# Patient Record
Sex: FEMALE | Race: WHITE | NOT HISPANIC OR LATINO | URBAN - METROPOLITAN AREA
[De-identification: names, ages, dates, MRNs, and addresses within clinical notes are randomized per-mention and may not be internally consistent; named-entity substitution may affect disease eponyms.]

---

## 2017-11-16 ENCOUNTER — INPATIENT (INPATIENT)
Facility: HOSPITAL | Age: 82
LOS: 1 days | Discharge: HOME | End: 2017-11-18
Attending: HOSPITALIST | Admitting: HOSPITALIST
Payer: MEDICARE

## 2017-11-16 VITALS
TEMPERATURE: 98 F | DIASTOLIC BLOOD PRESSURE: 81 MMHG | SYSTOLIC BLOOD PRESSURE: 158 MMHG | HEART RATE: 82 BPM | WEIGHT: 130.07 LBS | RESPIRATION RATE: 18 BRPM | HEIGHT: 62 IN | OXYGEN SATURATION: 98 %

## 2017-11-16 DIAGNOSIS — E78.00 PURE HYPERCHOLESTEROLEMIA, UNSPECIFIED: ICD-10-CM

## 2017-11-16 DIAGNOSIS — I10 ESSENTIAL (PRIMARY) HYPERTENSION: ICD-10-CM

## 2017-11-16 DIAGNOSIS — R74.8 ABNORMAL LEVELS OF OTHER SERUM ENZYMES: ICD-10-CM

## 2017-11-16 DIAGNOSIS — M79.89 OTHER SPECIFIED SOFT TISSUE DISORDERS: ICD-10-CM

## 2017-11-16 DIAGNOSIS — R55 SYNCOPE AND COLLAPSE: ICD-10-CM

## 2017-11-16 LAB
ALBUMIN SERPL ELPH-MCNC: 3.8 G/DL — SIGNIFICANT CHANGE UP (ref 3.3–5)
ALP SERPL-CCNC: 49 U/L — SIGNIFICANT CHANGE UP (ref 40–120)
ALT FLD-CCNC: 23 U/L — SIGNIFICANT CHANGE UP (ref 12–78)
ANION GAP SERPL CALC-SCNC: 7 MMOL/L — SIGNIFICANT CHANGE UP (ref 5–17)
APTT BLD: 31.5 SEC — SIGNIFICANT CHANGE UP (ref 27.5–37.4)
AST SERPL-CCNC: 24 U/L — SIGNIFICANT CHANGE UP (ref 15–37)
BASOPHILS # BLD AUTO: 0.1 K/UL — SIGNIFICANT CHANGE UP (ref 0–0.2)
BASOPHILS NFR BLD AUTO: 1.9 % — SIGNIFICANT CHANGE UP (ref 0–2)
BILIRUB SERPL-MCNC: 1 MG/DL — SIGNIFICANT CHANGE UP (ref 0.2–1.2)
BUN SERPL-MCNC: 12 MG/DL — SIGNIFICANT CHANGE UP (ref 7–23)
CALCIUM SERPL-MCNC: 9 MG/DL — SIGNIFICANT CHANGE UP (ref 8.5–10.1)
CHLORIDE SERPL-SCNC: 106 MMOL/L — SIGNIFICANT CHANGE UP (ref 96–108)
CO2 SERPL-SCNC: 29 MMOL/L — SIGNIFICANT CHANGE UP (ref 22–31)
CREAT SERPL-MCNC: 0.66 MG/DL — SIGNIFICANT CHANGE UP (ref 0.5–1.3)
EOSINOPHIL # BLD AUTO: 0.1 K/UL — SIGNIFICANT CHANGE UP (ref 0–0.5)
EOSINOPHIL NFR BLD AUTO: 1.1 % — SIGNIFICANT CHANGE UP (ref 0–6)
GLUCOSE BLDC GLUCOMTR-MCNC: 112 MG/DL — HIGH (ref 70–99)
GLUCOSE SERPL-MCNC: 127 MG/DL — HIGH (ref 70–99)
HCT VFR BLD CALC: 40.8 % — SIGNIFICANT CHANGE UP (ref 34.5–45)
HGB BLD-MCNC: 13.4 G/DL — SIGNIFICANT CHANGE UP (ref 11.5–15.5)
INR BLD: 1.13 RATIO — SIGNIFICANT CHANGE UP (ref 0.88–1.16)
LYMPHOCYTES # BLD AUTO: 1.2 K/UL — SIGNIFICANT CHANGE UP (ref 1–3.3)
LYMPHOCYTES # BLD AUTO: 24.8 % — SIGNIFICANT CHANGE UP (ref 13–44)
MCHC RBC-ENTMCNC: 31.1 PG — SIGNIFICANT CHANGE UP (ref 27–34)
MCHC RBC-ENTMCNC: 32.9 GM/DL — SIGNIFICANT CHANGE UP (ref 32–36)
MCV RBC AUTO: 94.5 FL — SIGNIFICANT CHANGE UP (ref 80–100)
MONOCYTES # BLD AUTO: 0.3 K/UL — SIGNIFICANT CHANGE UP (ref 0–0.9)
MONOCYTES NFR BLD AUTO: 7.1 % — SIGNIFICANT CHANGE UP (ref 2–14)
NEUTROPHILS # BLD AUTO: 3.2 K/UL — SIGNIFICANT CHANGE UP (ref 1.8–7.4)
NEUTROPHILS NFR BLD AUTO: 65.2 % — SIGNIFICANT CHANGE UP (ref 43–77)
NT-PROBNP SERPL-SCNC: 154 PG/ML — SIGNIFICANT CHANGE UP (ref 0–450)
PLATELET # BLD AUTO: 180 K/UL — SIGNIFICANT CHANGE UP (ref 150–400)
POTASSIUM SERPL-MCNC: 3.9 MMOL/L — SIGNIFICANT CHANGE UP (ref 3.5–5.3)
POTASSIUM SERPL-SCNC: 3.9 MMOL/L — SIGNIFICANT CHANGE UP (ref 3.5–5.3)
PROT SERPL-MCNC: 8 GM/DL — SIGNIFICANT CHANGE UP (ref 6–8.3)
PROTHROM AB SERPL-ACNC: 12.4 SEC — SIGNIFICANT CHANGE UP (ref 9.8–12.7)
RBC # BLD: 4.32 M/UL — SIGNIFICANT CHANGE UP (ref 3.8–5.2)
RBC # FLD: 12.7 % — SIGNIFICANT CHANGE UP (ref 11–15)
SODIUM SERPL-SCNC: 142 MMOL/L — SIGNIFICANT CHANGE UP (ref 135–145)
TROPONIN I SERPL-MCNC: 0.05 NG/ML — HIGH (ref 0.01–0.04)
TROPONIN I SERPL-MCNC: 0.05 NG/ML — HIGH (ref 0.01–0.04)
WBC # BLD: 4.8 K/UL — SIGNIFICANT CHANGE UP (ref 3.8–10.5)
WBC # FLD AUTO: 4.8 K/UL — SIGNIFICANT CHANGE UP (ref 3.8–10.5)

## 2017-11-16 PROCEDURE — 71010: CPT | Mod: 26

## 2017-11-16 PROCEDURE — 99284 EMERGENCY DEPT VISIT MOD MDM: CPT

## 2017-11-16 PROCEDURE — 93010 ELECTROCARDIOGRAM REPORT: CPT

## 2017-11-16 PROCEDURE — 70450 CT HEAD/BRAIN W/O DYE: CPT | Mod: 26

## 2017-11-16 PROCEDURE — 99223 1ST HOSP IP/OBS HIGH 75: CPT

## 2017-11-16 RX ORDER — SIMVASTATIN 20 MG/1
20 TABLET, FILM COATED ORAL AT BEDTIME
Qty: 0 | Refills: 0 | Status: DISCONTINUED | OUTPATIENT
Start: 2017-11-16 | End: 2017-11-18

## 2017-11-16 RX ORDER — REGADENOSON 0.08 MG/ML
0.4 INJECTION, SOLUTION INTRAVENOUS ONCE
Qty: 0 | Refills: 0 | Status: COMPLETED | OUTPATIENT
Start: 2017-11-16 | End: 2017-11-17

## 2017-11-16 RX ORDER — ASPIRIN/CALCIUM CARB/MAGNESIUM 324 MG
325 TABLET ORAL ONCE
Qty: 0 | Refills: 0 | Status: COMPLETED | OUTPATIENT
Start: 2017-11-16 | End: 2017-11-16

## 2017-11-16 RX ORDER — ASPIRIN/CALCIUM CARB/MAGNESIUM 324 MG
81 TABLET ORAL DAILY
Qty: 0 | Refills: 0 | Status: DISCONTINUED | OUTPATIENT
Start: 2017-11-16 | End: 2017-11-18

## 2017-11-16 RX ORDER — AMLODIPINE BESYLATE 2.5 MG/1
10 TABLET ORAL AT BEDTIME
Qty: 0 | Refills: 0 | Status: DISCONTINUED | OUTPATIENT
Start: 2017-11-16 | End: 2017-11-18

## 2017-11-16 RX ADMIN — SIMVASTATIN 20 MILLIGRAM(S): 20 TABLET, FILM COATED ORAL at 21:10

## 2017-11-16 RX ADMIN — Medication 325 MILLIGRAM(S): at 12:23

## 2017-11-16 RX ADMIN — AMLODIPINE BESYLATE 10 MILLIGRAM(S): 2.5 TABLET ORAL at 21:11

## 2017-11-16 NOTE — ED ADULT NURSE REASSESSMENT NOTE - NS ED NURSE REASSESS COMMENT FT1
denies chest pain at present denies dizziness at present vss on monitor admission pending no acute distress noted

## 2017-11-16 NOTE — ED ADULT NURSE NOTE - OBJECTIVE STATEMENT
patient received, alert and oriented x4, stated blacked out upon walking from kitchen to bedroom, denies falling on the floor, denies hitting head. denies any pain at this time.

## 2017-11-16 NOTE — H&P ADULT - NSHPPHYSICALEXAM_GEN_ALL_CORE
GENERAL: NAD well-developed  HEAD:  Atraumatic, Normocephalic  EYES: EOMI, PERRLA, conjunctiva and sclera clear  ENMT: No tonsillar erythema, exudates, or enlargement; Moist mucous membranes, Good dentition, No lesions  NECK: Supple, No JVD, Normal thyroid  NERVOUS SYSTEM:  Alert & Oriented X3, Good concentration; Motor Strength 5/5 B/L upper and lower extremities; DTRs 2+ intact and symmetric  CHEST/LUNG: Clear to percussion bilaterally; No rales, rhonchi, wheezing, or rubs  HEART: Regular rate and rhythm; No murmurs, rubs, or gallops  ABDOMEN: Soft, Nontender, Nondistended; Bowel sounds present  EXTREMITIES:  right leg>left leg   LYMPH: No lymphadenopathy   SKIN: No rashes or lesions

## 2017-11-16 NOTE — ED PROVIDER NOTE - MEDICAL DECISION MAKING DETAILS
A/P: 82 y/o F w hx of htn, hld, presents w near syncope episode today; ekg nl;   will check labs, CT head given age, and reassess A/P: 82 y/o F w hx of htn, hld, presents w near syncope episode today; ekg nl;   will check labs, CT head given age, and reassess    trop 0.054; given aspirin; will admit to hospitalist team; dr sorto (cardiology paged, awaiting call back)

## 2017-11-16 NOTE — H&P ADULT - ASSESSMENT
83f with history of Hypertension with near syncope with enlarged  right leg     IMPROVE VTE Individual Risk Assessment          RISK                                                          Points    [  ] Previous VTE                                                3    [  ] Thrombophilia                                             2    [  ] Lower limb paralysis                                    2        (unable to hold up >15 seconds)      [  ] Current Cancer                                             2         (within 6 months)    [ x ] Immobilization > 24 hrs                              1    [  ] ICU/CCU stay > 24 hours                            1    [ x ] Age > 60                                                    1    IMPROVE VTE Score ______2___  on deep vein thrombosis tx pending doppler

## 2017-11-16 NOTE — ED PROVIDER NOTE - OBJECTIVE STATEMENT
Pertinent PMH/PSH/FHx/SHx and Review of Systems contained within:    82 y/o F w hx of htn, hld, presents w near syncope episode today; patient reports she woke up this morning, went to the kitchen to take her usual morning coffee, and while in the kitchen, patient reports she felt dizzy, characterized as feeling lightheaded.  pt went to her bedroom, and laid down on the bed; patient denies syncope or LOC at any point; denies fall; her son called EMS; patient reports checking her blood pressure and her blood pressure was elevated so she came to the ED; NO headache, chest pain, palpitations, abd pain, focal weaknes or sensory changes; denies any other complaints on review of systems    PMH: as above  meds: zocor; amlodipine  allergies: nkda  SH: denies cig or drug use    EKG: sinus 83; nl intervals;

## 2017-11-16 NOTE — CONSULT NOTE ADULT - ASSESSMENT
84 y/o F w hx of htn, hld, presents w near syncope today; reports she felt dizzy/lightheaded but denies syncope or LOC at any point  Reports checking her blood pressure and her blood pressure was elevated  (160s/115).  Feeling much better currently.  EKG unremarkable: sinus 83bpm with sinus arrhythmia; no ST-T wave changes  Head CT with no acute event.  Dk x 1 with trop 0.05 with neg CK    Likely HTN urgency.    -cont home BP meds and adjust as needed; currently 150/70.  Can add ACE in AM if remains at this level.  -2D echo  -monitor on tele  -trend Dk  -daily EKG  -stress test when stable (can be done as outpt)

## 2017-11-16 NOTE — CONSULT NOTE ADULT - SUBJECTIVE AND OBJECTIVE BOX
CARDIOLOGY CONSULT NOTE    Patient is a 83y Female with a known history of :    HPI:  82 y/o F w hx of htn, hld, presents w near syncope episode today; patient reports she woke up this morning, went to the kitchen to take her usual morning coffee, and while in the kitchen, patient reports she felt dizzy, characterized as feeling lightheaded.  pt went to her bedroom, and laid down on the bed; patient denies syncope or LOC at any point; denies fall; her son called EMS; patient reports checking her blood pressure and her blood pressure was elevated  (160s/115) so she came to the ED; NO headache, chest pain, palpitations, abd pain, focal weaknes or sensory changes; denies any other complaints on review of systems.  Feeling much better currently.  Comfortable on stretcher.  EKG unremarkable; Head CT with no acute event.      REVIEW OF SYSTEMS:    CONSTITUTIONAL: No fever, weight loss; + fatigue  EYES: No eye pain, visual disturbances, or discharge  ENMT:  No difficulty hearing, tinnitus, vertigo; No sinus or throat pain  NECK: No pain or stiffness  BREASTS: No pain, masses, or nipple discharge  RESPIRATORY: No cough, wheezing, chills or hemoptysis; No shortness of breath  CARDIOVASCULAR: No chest pain, palpitations; + dizziness  GASTROINTESTINAL: No abdominal or epigastric pain. No nausea, vomiting, or hematemesis; No diarrhea or constipation. No melena or hematochezia.  GENITOURINARY: No dysuria, frequency, hematuria, or incontinence  NEUROLOGICAL: No headaches, memory loss, loss of strength, numbness, or tremors  SKIN: No itching, burning, rashes, or lesions   LYMPH NODES: No enlarged glands  ENDOCRINE: No heat or cold intolerance; No hair loss  MUSCULOSKELETAL: No joint pain or swelling; No muscle, back, or extremity pain  PSYCHIATRIC: No depression, anxiety, mood swings, or difficulty sleeping  HEME/LYMPH: No easy bruising, or bleeding gums  ALLERGY AND IMMUNOLOGIC: No hives or eczema    MEDICATIONS  (STANDING):  Home: zocor and amlodipine    MEDICATIONS  (PRN):      ALLERGIES: No Known Allergies      FAMILY HISTORY:  N/C    PHYSICAL EXAMINATION:  -----------------------------  T(C): 36.5 (11-16-17 @ 09:53), Max: 36.5 (11-16-17 @ 09:53)  HR: 89 (11-16-17 @ 12:24) (82 - 89)  BP: 151/71 (11-16-17 @ 12:24) (151/71 - 158/81)  RR: 18 (11-16-17 @ 12:24) (18 - 18)  SpO2: 98% (11-16-17 @ 12:24) (98% - 98%)  Wt(kg): --    Height (cm): 157.48 (11-16 @ 09:53)  Weight (kg): 59 (11-16 @ 09:53)  BMI (kg/m2): 23.8 (11-16 @ 09:53)  BSA (m2): 1.59 (11-16 @ 09:53)    Constitutional: well developed, normal appearance, well groomed, well nourished, no deformities and no acute distress.   Eyes: the conjunctiva exhibited no abnormalities and the eyelids demonstrated no xanthelasmas.   HEENT: normal oral mucosa, no oral pallor and no oral cyanosis.   Neck: normal jugular venous A waves present, normal jugular venous V waves present and no jugular venous marie A waves.   Pulmonary: no respiratory distress, normal respiratory rhythm and effort, no accessory muscle use and lungs were clear to auscultation bilaterally.   Cardiovascular: heart rate and rhythm were normal, normal S1 and S2 and no murmur, gallop, rub, heave or thrill are present.   Abdomen: soft, non-tender, no hepato-splenomegaly and no abdominal mass palpated.   Musculoskeletal: the gait could not be assessed..   Extremities: no clubbing of the fingernails, no localized cyanosis, no petechial hemorrhages and no ischemic changes.   Skin: normal skin color and pigmentation, no rash, no venous stasis, no skin lesions, no skin ulcer and no xanthoma was observed.   Psychiatric: oriented to person, place, and time, the affect was normal, the mood was normal and not feeling anxious.     LABS:   --------  11-16    142  |  106  |  12  ----------------------------<  127<H>  3.9   |  29  |  0.66    Ca    9.0      16 Nov 2017 11:18    TPro  8.0  /  Alb  3.8  /  TBili  1.0  /  DBili  x   /  AST  24  /  ALT  23  /  AlkPhos  49  11-16                         13.4   4.8   )-----------( 180      ( 16 Nov 2017 11:18 )             40.8     PT/INR - ( 16 Nov 2017 11:18 )   PT: 12.4 sec;   INR: 1.13 ratio         PTT - ( 16 Nov 2017 11:18 )  PTT:31.5 sec  11-16 @ 11:21 BNP: 154 pg/mL    11-16 @ 11:20 CPK total:--, CKMB --, Troponin I - .054 ng/mL<H>          RADIOLOGY:  -----------------    ECG:  sinus 83bpm with sinus arrhythmia; no ST-T wave changes

## 2017-11-16 NOTE — H&P ADULT - NSHPLABSRESULTS_GEN_ALL_CORE
13.4   4.8   )-----------( 180      ( 16 Nov 2017 11:18 )             40.8   11-16    142  |  106  |  12  ----------------------------<  127<H>  3.9   |  29  |  0.66    Ca    9.0      16 Nov 2017 11:18    TPro  8.0  /  Alb  3.8  /  TBili  1.0  /  DBili  x   /  AST  24  /  ALT  23  /  AlkPhos  49  11-16    < from: CT Head No Cont (11.16.17 @ 11:52) >    )  chronic ischemic changes noted in both hemispheres with volume loss.   No acute abnormality suggested. Incidental a tortuous vertebrobasilar   system..  2)  no hemorrhagic foci or subdural hematoma seen.

## 2017-11-16 NOTE — H&P ADULT - HISTORY OF PRESENT ILLNESS
82 y/o F w hx of htn, hld, presents w near syncope episode today; patient reports she woke up this morning, went to the kitchen to take her usual morning coffee, and while in the kitchen, patient reports she felt dizzy, characterized as feeling lightheaded.  pt went to her bedroom, and laid down on the bed; patient denies syncope or LOC at any point; denies fall; her son called EMS; patient reports checking her blood pressure and her blood pressure was elevated so she came to the ED; NO headache, chest pain, palpitations, abd pain, focal weaknes or sensory changes; denies any other complaints on review of systems

## 2017-11-16 NOTE — ED PROVIDER NOTE - PHYSICAL EXAMINATION
Gen: well appearing, in no distress  HEENT: clear oropharynx; EOMI; PERRL ~ 2mm; no signs of trauma  neck: full cervical ROM; no c spine ttp  CV: RRR; no m/g/r  lungs; CTAB; no w/r/r  abd: soft, nd, nt;   back: no t/l spine ttp  ext: wwp; full ROM; wwp;   neuro: no focal weakness/sensory changes; cn 2-12 nl

## 2017-11-17 DIAGNOSIS — Z29.9 ENCOUNTER FOR PROPHYLACTIC MEASURES, UNSPECIFIED: ICD-10-CM

## 2017-11-17 LAB
ANION GAP SERPL CALC-SCNC: 6 MMOL/L — SIGNIFICANT CHANGE UP (ref 5–17)
BUN SERPL-MCNC: 12 MG/DL — SIGNIFICANT CHANGE UP (ref 7–23)
CALCIUM SERPL-MCNC: 8.6 MG/DL — SIGNIFICANT CHANGE UP (ref 8.5–10.1)
CHLORIDE SERPL-SCNC: 107 MMOL/L — SIGNIFICANT CHANGE UP (ref 96–108)
CO2 SERPL-SCNC: 28 MMOL/L — SIGNIFICANT CHANGE UP (ref 22–31)
CREAT SERPL-MCNC: 0.61 MG/DL — SIGNIFICANT CHANGE UP (ref 0.5–1.3)
GLUCOSE SERPL-MCNC: 96 MG/DL — SIGNIFICANT CHANGE UP (ref 70–99)
HCT VFR BLD CALC: 38.1 % — SIGNIFICANT CHANGE UP (ref 34.5–45)
HGB BLD-MCNC: 12.4 G/DL — SIGNIFICANT CHANGE UP (ref 11.5–15.5)
MCHC RBC-ENTMCNC: 30.9 PG — SIGNIFICANT CHANGE UP (ref 27–34)
MCHC RBC-ENTMCNC: 32.6 GM/DL — SIGNIFICANT CHANGE UP (ref 32–36)
MCV RBC AUTO: 94.7 FL — SIGNIFICANT CHANGE UP (ref 80–100)
PLATELET # BLD AUTO: 173 K/UL — SIGNIFICANT CHANGE UP (ref 150–400)
POTASSIUM SERPL-MCNC: 3.4 MMOL/L — LOW (ref 3.5–5.3)
POTASSIUM SERPL-SCNC: 3.4 MMOL/L — LOW (ref 3.5–5.3)
RBC # BLD: 4.03 M/UL — SIGNIFICANT CHANGE UP (ref 3.8–5.2)
RBC # FLD: 12.7 % — SIGNIFICANT CHANGE UP (ref 11–15)
SODIUM SERPL-SCNC: 141 MMOL/L — SIGNIFICANT CHANGE UP (ref 135–145)
TROPONIN I SERPL-MCNC: 0.06 NG/ML — HIGH (ref 0.01–0.04)
TROPONIN I SERPL-MCNC: 0.07 NG/ML — HIGH (ref 0.01–0.04)
WBC # BLD: 6.3 K/UL — SIGNIFICANT CHANGE UP (ref 3.8–10.5)
WBC # FLD AUTO: 6.3 K/UL — SIGNIFICANT CHANGE UP (ref 3.8–10.5)

## 2017-11-17 PROCEDURE — 93306 TTE W/DOPPLER COMPLETE: CPT | Mod: 26

## 2017-11-17 PROCEDURE — 99232 SBSQ HOSP IP/OBS MODERATE 35: CPT | Mod: 25

## 2017-11-17 PROCEDURE — 99233 SBSQ HOSP IP/OBS HIGH 50: CPT

## 2017-11-17 PROCEDURE — 93016 CV STRESS TEST SUPVJ ONLY: CPT

## 2017-11-17 RX ORDER — LACTULOSE 10 G/15ML
10 SOLUTION ORAL ONCE
Qty: 0 | Refills: 0 | Status: COMPLETED | OUTPATIENT
Start: 2017-11-17 | End: 2017-11-17

## 2017-11-17 RX ORDER — ENOXAPARIN SODIUM 100 MG/ML
40 INJECTION SUBCUTANEOUS EVERY 24 HOURS
Qty: 0 | Refills: 0 | Status: DISCONTINUED | OUTPATIENT
Start: 2017-11-17 | End: 2017-11-18

## 2017-11-17 RX ORDER — POTASSIUM CHLORIDE 20 MEQ
40 PACKET (EA) ORAL ONCE
Qty: 0 | Refills: 0 | Status: COMPLETED | OUTPATIENT
Start: 2017-11-17 | End: 2017-11-17

## 2017-11-17 RX ADMIN — SIMVASTATIN 20 MILLIGRAM(S): 20 TABLET, FILM COATED ORAL at 21:16

## 2017-11-17 RX ADMIN — REGADENOSON 0.4 MILLIGRAM(S): 0.08 INJECTION, SOLUTION INTRAVENOUS at 12:01

## 2017-11-17 RX ADMIN — LACTULOSE 10 GRAM(S): 10 SOLUTION ORAL at 18:36

## 2017-11-17 RX ADMIN — Medication 81 MILLIGRAM(S): at 15:09

## 2017-11-17 RX ADMIN — AMLODIPINE BESYLATE 10 MILLIGRAM(S): 2.5 TABLET ORAL at 21:16

## 2017-11-17 RX ADMIN — Medication 40 MILLIEQUIVALENT(S): at 15:09

## 2017-11-17 RX ADMIN — ENOXAPARIN SODIUM 40 MILLIGRAM(S): 100 INJECTION SUBCUTANEOUS at 15:09

## 2017-11-17 NOTE — PROGRESS NOTE ADULT - SUBJECTIVE AND OBJECTIVE BOX
Patient is a 83y old  Female who presents with a chief complaint of syncope (16 Nov 2017 13:45)      PAST MEDICAL & SURGICAL HISTORY:  Hypercholesteremia  HTN (hypertension)  No significant past surgical history      INTERVAL HISTORY: Resting in bed, not in any distress  	  MEDICATIONS:  MEDICATIONS  (STANDING):  amLODIPine   Tablet 10 milliGRAM(s) Oral at bedtime  aspirin enteric coated 81 milliGRAM(s) Oral daily  enoxaparin Injectable 40 milliGRAM(s) SubCutaneous every 24 hours  potassium chloride    Tablet ER 40 milliEquivalent(s) Oral once  regadenoson Injectable 0.4 milliGRAM(s) IV Push once  simvastatin 20 milliGRAM(s) Oral at bedtime    MEDICATIONS  (PRN):      Vitals:  T(F): 97.8 (11-17-17 @ 05:19), Max: 98.1 (11-16-17 @ 15:19)  HR: 77 (11-17-17 @ 05:19) (72 - 89)  BP: 132/72 (11-17-17 @ 05:19) (122/68 - 151/71)  RR: 18 (11-17-17 @ 05:19) (15 - 18)  SpO2: 96% (11-17-17 @ 05:19) (96% - 99%)  Wt(kg): -- 60kg    11-16 @ 07:01  -  11-17 @ 07:00  --------------------------------------------------------  IN:    Oral Fluid: 118 mL  Total IN: 118 mL    OUT:  Total OUT: 0 mL    Total NET: 118 mL        Weight (kg): 58.5 (11-16 @ 18:00)  BMI (kg/m2): 23.6 (11-16 @ 18:00)      PHYSICAL EXAM:  Neuro:  CV:   Lungs:   GI:   Extremities:     TELEMETRY: RSR  	    RADIOLOGY: < from: Xray Chest 1 View AP/PA. (11.16.17 @ 14:14) >  Normal heart size accounting for AP film. Unfolding thoracic aorta.   Bilateral calcified hilar lymph nodes. No focalconsolidation or pleural   effusion.    Impression: No acute process.    < end of copied text >  < from: CT Head No Cont (11.16.17 @ 11:52) >  1)  chronic ischemic changes noted in both hemispheres with volume loss.   No acute abnormality suggested. Incidental a tortuous vertebrobasilar   system..  2)  no hemorrhagic foci or subdural hematoma seen.    < end of copied text >      DIAGNOSTIC TESTING:    [P ] Echocardiogram:       LABS:	 	    CARDIAC MARKERS:  Troponin I, Serum: .065 ng/mL (11-17 @ 04:43)  Troponin I, Serum: .048 ng/mL (11-16 @ 20:14)  Troponin I, Serum: .054 ng/mL (11-16 @ 11:20)          17 Nov 2017 04:43    141    |  107    |  12     ----------------------------<  96     3.4     |  28     |  0.61   16 Nov 2017 11:18    142    |  106    |  12     ----------------------------<  127    3.9     |  29     |  0.66     Ca    8.6        17 Nov 2017 04:43    TPro  8.0    /  Alb  3.8    /  TBili  1.0    /  DBili  x      /  AST  24     /  ALT  23     /  AlkPhos  49     16 Nov 2017 11:18                          12.4   6.3   )-----------( 173      ( 17 Nov 2017 04:43 )             38.1 ,                       13.4   4.8   )-----------( 180      ( 16 Nov 2017 11:18 )             40.8   proBNP: Serum Pro-Brain Natriuretic Peptide: 154 pg/mL (11-16 @ 11:21)      PT/PTT- ( 16 Nov 2017 11:18 )   PT: 12.4 sec;  PTT: 31.5 sec      INR: 1.13 ratio (11-16 @ 11:18) Patient is a 83y old  Female who presents with a chief complaint of syncope (16 Nov 2017 13:45)      PAST MEDICAL & SURGICAL HISTORY:  Hypercholesteremia  HTN (hypertension)  No significant past surgical history      INTERVAL HISTORY: Resting in bed, not in any distress, denies any chest pain or Sob  	  MEDICATIONS:  MEDICATIONS  (STANDING):  amLODIPine   Tablet 10 milliGRAM(s) Oral at bedtime  aspirin enteric coated 81 milliGRAM(s) Oral daily  enoxaparin Injectable 40 milliGRAM(s) SubCutaneous every 24 hours  potassium chloride    Tablet ER 40 milliEquivalent(s) Oral once  regadenoson Injectable 0.4 milliGRAM(s) IV Push once  simvastatin 20 milliGRAM(s) Oral at bedtime    MEDICATIONS  (PRN):      Vitals:  T(F): 97.8 (11-17-17 @ 05:19), Max: 98.1 (11-16-17 @ 15:19)  HR: 77 (11-17-17 @ 05:19) (72 - 89)  BP: 132/72 (11-17-17 @ 05:19) (122/68 - 151/71)  RR: 18 (11-17-17 @ 05:19) (15 - 18)  SpO2: 96% (11-17-17 @ 05:19) (96% - 99%)  Wt(kg): -- 60kg    11-16 @ 07:01  -  11-17 @ 07:00  --------------------------------------------------------  IN:    Oral Fluid: 118 mL  Total IN: 118 mL    OUT:  Total OUT: 0 mL    Total NET: 118 mL        Weight (kg): 58.5 (11-16 @ 18:00)  BMI (kg/m2): 23.6 (11-16 @ 18:00)      PHYSICAL EXAM:  Neuro: Awake , responsive  CV: RRR, S1 S2, + soft systolic murmur  Lungs: CTABL  GI: soft, BS+, NT, ND  Extremities: No edema    TELEMETRY: RSR  	    RADIOLOGY: < from: Xray Chest 1 View AP/PA. (11.16.17 @ 14:14) >  Normal heart size accounting for AP film. Unfolding thoracic aorta.   Bilateral calcified hilar lymph nodes. No focalconsolidation or pleural   effusion.    Impression: No acute process.    < end of copied text >  < from: CT Head No Cont (11.16.17 @ 11:52) >  1)  chronic ischemic changes noted in both hemispheres with volume loss.   No acute abnormality suggested. Incidental a tortuous vertebrobasilar   system..  2)  no hemorrhagic foci or subdural hematoma seen.    < end of copied text >      DIAGNOSTIC TESTING:    [X ] Echocardiogram: < from: TTE Echo Doppler w/o Cont (11.17.17 @ 10:51) >   1. Left ventricular ejection fraction, by visual estimation, is 60 to   65%.   2. Technically good study.   3. Normal global left ventricular systolic function.   4. Normal left ventricular internal cavity size.   5. Spectral Doppler shows impaired relaxation pattern of left   ventricular myocardial filling (Grade I diastolic dysfunction).   6. Normal rightventricular size and function.   7. Mild mitral annular calcification.   8. Mild mitral valve regurgitation.   9. Thickening and calcification of the anterior and posterior mitral   valve leaflets.  10. Degenerative tricuspid valve.  11. Mild to moderate aortic regurgitation.  12. Estimated pulmonary artery systolic pressure is 37.6 mmHg assuming a   right atrial pressure of 5 mmHg, which is consistent with borderline   pulmonary hypertension.  13. Peak transaortic gradient equals 9.8 mmHg, mean transaortic gradient   equals 6.3 mmHg, the calculated aortic valve area equals 2.50 cm² by the   continuity equation consistent with mild aortic stenosis.    < end of copied text >    < from: NM Pharm Stress Test, Dual Isotope (11.17.17 @ 14:07) >  1. No scintigraphic evidence for myocardial infarct or ischemia.    2. There is normal left ventricular contractility, normal calculated   ejection fraction and normal myocardial thickening at rest. Overall post   stress ejection fraction was 73 %     3. Please see the cardiac test report for EKG findings and symptoms   during the procedure    < end of copied text >      LABS:	 	    CARDIAC MARKERS:  Troponin I, Serum: .065 ng/mL (11-17 @ 04:43)  Troponin I, Serum: .048 ng/mL (11-16 @ 20:14)  Troponin I, Serum: .054 ng/mL (11-16 @ 11:20)          17 Nov 2017 04:43    141    |  107    |  12     ----------------------------<  96     3.4     |  28     |  0.61   16 Nov 2017 11:18    142    |  106    |  12     ----------------------------<  127    3.9     |  29     |  0.66     Ca    8.6        17 Nov 2017 04:43    TPro  8.0    /  Alb  3.8    /  TBili  1.0    /  DBili  x      /  AST  24     /  ALT  23     /  AlkPhos  49     16 Nov 2017 11:18                          12.4   6.3   )-----------( 173      ( 17 Nov 2017 04:43 )             38.1 ,                       13.4   4.8   )-----------( 180      ( 16 Nov 2017 11:18 )             40.8   proBNP: Serum Pro-Brain Natriuretic Peptide: 154 pg/mL (11-16 @ 11:21)      PT/PTT- ( 16 Nov 2017 11:18 )   PT: 12.4 sec;  PTT: 31.5 sec      INR: 1.13 ratio (11-16 @ 11:18) Patient is a 83y old  Female who presents with a chief complaint of syncope (16 Nov 2017 13:45)      PAST MEDICAL & SURGICAL HISTORY:  Hypercholesteremia  HTN (hypertension)  No significant past surgical history      INTERVAL HISTORY: Resting in bed, not in any distress, denies any chest pain or Sob  	  MEDICATIONS:  MEDICATIONS  (STANDING):  amLODIPine   Tablet 10 milliGRAM(s) Oral at bedtime  aspirin enteric coated 81 milliGRAM(s) Oral daily  enoxaparin Injectable 40 milliGRAM(s) SubCutaneous every 24 hours  potassium chloride    Tablet ER 40 milliEquivalent(s) Oral once  regadenoson Injectable 0.4 milliGRAM(s) IV Push once  simvastatin 20 milliGRAM(s) Oral at bedtime      Vitals:  T(F): 97.8 (11-17-17 @ 05:19), Max: 98.1 (11-16-17 @ 15:19)  HR: 77 (11-17-17 @ 05:19) (72 - 89)  BP: 132/72 (11-17-17 @ 05:19) (122/68 - 151/71)  RR: 18 (11-17-17 @ 05:19) (15 - 18)  SpO2: 96% (11-17-17 @ 05:19) (96% - 99%)  Wt(kg): -- 60kg    11-16 @ 07:01  -  11-17 @ 07:00  --------------------------------------------------------  IN:    Oral Fluid: 118 mL  Total IN: 118 mL    OUT:  Total OUT: 0 mL    Total NET: 118 mL        Weight (kg): 58.5 (11-16 @ 18:00)  BMI (kg/m2): 23.6 (11-16 @ 18:00)      PHYSICAL EXAM:  Neuro: Awake , responsive  CV: RRR, S1 S2, + soft systolic murmur  Lungs: CTABL  GI: soft, BS+, NT, ND  Extremities: No edema    TELEMETRY: RSR  	    RADIOLOGY: < from: Xray Chest 1 View AP/PA. (11.16.17 @ 14:14) >  Normal heart size accounting for AP film. Unfolding thoracic aorta.   Bilateral calcified hilar lymph nodes. No focalconsolidation or pleural   effusion.    Impression: No acute process.    < end of copied text >  < from: CT Head No Cont (11.16.17 @ 11:52) >  1)  chronic ischemic changes noted in both hemispheres with volume loss.   No acute abnormality suggested. Incidental a tortuous vertebrobasilar   system..  2)  no hemorrhagic foci or subdural hematoma seen.    < end of copied text >      DIAGNOSTIC TESTING:    [X ] Echocardiogram: < from: TTE Echo Doppler w/o Cont (11.17.17 @ 10:51) >   1. Left ventricular ejection fraction, by visual estimation, is 60 to   65%.   2. Technically good study.   3. Normal global left ventricular systolic function.   4. Normal left ventricular internal cavity size.   5. Spectral Doppler shows impaired relaxation pattern of left   ventricular myocardial filling (Grade I diastolic dysfunction).   6. Normal rightventricular size and function.   7. Mild mitral annular calcification.   8. Mild mitral valve regurgitation.   9. Thickening and calcification of the anterior and posterior mitral   valve leaflets.  10. Degenerative tricuspid valve.  11. Mild to moderate aortic regurgitation.  12. Estimated pulmonary artery systolic pressure is 37.6 mmHg assuming a   right atrial pressure of 5 mmHg, which is consistent with borderline   pulmonary hypertension.  13. Peak transaortic gradient equals 9.8 mmHg, mean transaortic gradient   equals 6.3 mmHg, the calculated aortic valve area equals 2.50 cm² by the   continuity equation consistent with mild aortic stenosis.    < end of copied text >    < from: NM Pharm Stress Test, Dual Isotope (11.17.17 @ 14:07) >  1. No scintigraphic evidence for myocardial infarct or ischemia.    2. There is normal left ventricular contractility, normal calculated   ejection fraction and normal myocardial thickening at rest. Overall post   stress ejection fraction was 73 %     3. Please see the cardiac test report for EKG findings and symptoms   during the procedure    < end of copied text >      LABS:	 	    CARDIAC MARKERS:  Troponin I, Serum: .065 ng/mL (11-17 @ 04:43)  Troponin I, Serum: .048 ng/mL (11-16 @ 20:14)  Troponin I, Serum: .054 ng/mL (11-16 @ 11:20)          17 Nov 2017 04:43    141    |  107    |  12     ----------------------------<  96     3.4     |  28     |  0.61   16 Nov 2017 11:18    142    |  106    |  12     ----------------------------<  127    3.9     |  29     |  0.66     Ca    8.6        17 Nov 2017 04:43    TPro  8.0    /  Alb  3.8    /  TBili  1.0    /  DBili  x      /  AST  24     /  ALT  23     /  AlkPhos  49     16 Nov 2017 11:18                          12.4   6.3   )-----------( 173      ( 17 Nov 2017 04:43 )             38.1 ,                       13.4   4.8   )-----------( 180      ( 16 Nov 2017 11:18 )             40.8   proBNP: Serum Pro-Brain Natriuretic Peptide: 154 pg/mL (11-16 @ 11:21)      PT/PTT- ( 16 Nov 2017 11:18 )   PT: 12.4 sec;  PTT: 31.5 sec      INR: 1.13 ratio (11-16 @ 11:18)

## 2017-11-17 NOTE — PROGRESS NOTE ADULT - ASSESSMENT
82 y/o F w hx of htn, hld, presents w near syncope today; reports she felt dizzy/lightheaded but denies syncope or LOC at any point  Reports checking her blood pressure and her blood pressure was elevated  (160s/115).  Feeling much better currently.  EKG unremarkable: sinus 83bpm with sinus arrhythmia; no ST-T wave changes  Head CT with no acute event.  Dk x 3 with trop 0.048-0.065  with neg CK    Likely HTN urgency.    -BP now controlled on Norvasc 10  C/w ASA  c/w statin for HLD  -2D echo pending  -monitor on tele  -stress test   - check Orthostatic BP  -Potassium PO for hypokalemia 82 y/o F w hx of htn, hld, presents w near syncope today; reports she felt dizzy/lightheaded but denies syncope or LOC at any point  Reports checking her blood pressure and her blood pressure was elevated  (160s/115).  Feeling much better currently.  EKG unremarkable: sinus 83bpm with sinus arrhythmia; no ST-T wave changes  Head CT with no acute event.  Dk x 3 with trop 0.048-0.065  with neg CK    Likely HTN urgency.    -BP now controlled on Norvasc 10  C/w ASA  c/w statin for HLD  -2D echo with preserved EF  -stress test : No ischemia  - check Orthostatic BP  -Potassium PO for hypokalemia  f/u with Dr. Winters as outpatient

## 2017-11-17 NOTE — PROGRESS NOTE ADULT - ASSESSMENT
82 y/o F w hx of htn, hld, presents w near syncope today; reports she felt dizzy/lightheaded but denies syncope or LOC at any point  Reports checking her blood pressure and her blood pressure was elevated  (160s/115).  Feeling much better currently.  EKG unremarkable: sinus 83bpm with sinus arrhythmia; no ST-T wave changes  Head CT with no acute event.  Dk x 3 with trop 0.048-0.065  with neg CK    Likely HTN urgency.    -BP now controlled on Norvasc 10  C/w ASA  c/w statin for HLD  -2D echo with preserved EF  -stress test : No ischemia  - check Orthostatic BP  -Potassium PO for hypokalemia  f/u with Dr. Winters as outpatient

## 2017-11-17 NOTE — PROGRESS NOTE ADULT - SUBJECTIVE AND OBJECTIVE BOX
Patient is a 83y old  Female who presents with a chief complaint of syncope (16 Nov 2017 13:45)      INTERVAL HPI/OVERNIGHT EVENTS: new admission overnight feeling ok but has complete of right leg swelling     MEDICATIONS  (STANDING):  amLODIPine   Tablet 10 milliGRAM(s) Oral at bedtime  aspirin enteric coated 81 milliGRAM(s) Oral daily  enoxaparin Injectable 40 milliGRAM(s) SubCutaneous every 24 hours  lactulose Syrup 10 Gram(s) Oral once  simvastatin 20 milliGRAM(s) Oral at bedtime    MEDICATIONS  (PRN):      Allergies    No Known Allergies    Intolerances        REVIEW OF SYSTEMS:  CONSTITUTIONAL: No fever, weight loss, or fatigue  EYES: No eye pain, visual disturbances, or discharge  ENMT:  No difficulty hearing, tinnitus, vertigo; No sinus or throat pain  NECK: No pain or stiffness  BREASTS: No pain, masses, or nipple discharge  RESPIRATORY: No cough, wheezing, chills or hemoptysis; No shortness of breath  CARDIOVASCULAR: No chest pain, palpitations, dizziness, or leg swelling  GASTROINTESTINAL: No abdominal or epigastric pain. No nausea, vomiting, or hematemesis; No diarrhea or constipation. No melena or hematochezia.  GENITOURINARY: No dysuria, frequency, hematuria, or incontinence  NEUROLOGICAL: No headaches, memory loss, loss of strength, numbness, or tremors  SKIN: No itching, burning, rashes, or lesions   LYMPH NODES: No enlarged glands  ENDOCRINE: No heat or cold intolerance; No hair loss  MUSCULOSKELETAL: No joint pain or swelling; No muscle, back, or extremity pain  PSYCHIATRIC: No depression, anxiety, mood swings, or difficulty sleeping  HEME/LYMPH: No easy bruising, or bleeding gums  ALLERGY AND IMMUNOLOGIC: No hives or eczema    Vital Signs Last 24 Hrs  T(C): 36.6 (17 Nov 2017 17:30), Max: 36.6 (17 Nov 2017 00:01)  T(F): 97.8 (17 Nov 2017 17:30), Max: 97.8 (17 Nov 2017 00:01)  HR: 82 (17 Nov 2017 17:30) (72 - 82)  BP: 127/62 (17 Nov 2017 17:30) (127/62 - 141/71)  BP(mean): --  RR: 16 (17 Nov 2017 17:30) (16 - 18)  SpO2: 96% (17 Nov 2017 17:30) (96% - 99%)    PHYSICAL EXAM:  GENERAL: NAD, well-groomed, well-developed  HEAD:  Atraumatic, Normocephalic  EYES: EOMI, PERRLA, conjunctiva and sclera clear  ENMT: No tonsillar erythema, exudates, or enlargement; Moist mucous membranes, Good dentition, No lesions  NECK: Supple, No JVD, Normal thyroid  NERVOUS SYSTEM:  Alert & Oriented X3, Good concentration; Motor Strength 5/5 B/L upper and lower extremities; DTRs 2+ intact and symmetric  CHEST/LUNG: Clear to percussion bilaterally; No rales, rhonchi, wheezing, or rubs  HEART: Regular rate and rhythm; No murmurs, rubs, or gallops  ABDOMEN: Soft, Nontender, Nondistended; Bowel sounds present  EXTREMITIES:  2+ Peripheral Pulses, No clubbing, cyanosis, or edema  LYMPH: No lymphadenopathy noted  SKIN: No rashes or lesions    LABS:                        12.4   6.3   )-----------( 173      ( 17 Nov 2017 04:43 )             38.1     11-17    141  |  107  |  12  ----------------------------<  96  3.4<L>   |  28  |  0.61    Ca    8.6      17 Nov 2017 04:43    TPro  8.0  /  Alb  3.8  /  TBili  1.0  /  DBili  x   /  AST  24  /  ALT  23  /  AlkPhos  49  11-16    PT/INR - ( 16 Nov 2017 11:18 )   PT: 12.4 sec;   INR: 1.13 ratio         PTT - ( 16 Nov 2017 11:18 )  PTT:31.5 sec    CAPILLARY BLOOD GLUCOSE  RADIOLOGY & ADDITIONAL TESTS:  < from: TTE Echo Doppler w/o Cont (11.17.17 @ 10:51) >  1. Left ventricular ejection fraction, by visual estimation, is 60 to   65%.   2. Technically good study.   3. Normal global left ventricular systolic function.   4. Normal left ventricular internal cavity size.   5. Spectral Doppler shows impaired relaxation pattern of left   ventricular myocardial filling (Grade I diastolic dysfunction).   6. Normal rightventricular size and function.   7. Mild mitral annular calcification.   8. Mild mitral valve regurgitation.   9. Thickening and calcification of the anterior and posterior mitral   valve leaflets.  10. Degenerative tricuspid valve.  11. Mild to moderate aortic regurgitation.  12. Estimated pulmonary artery systolic pressure is 37.6 mmHg assuming a   right atrial pressure of 5 mmHg, which is consistent with borderline   pulmonary hypertension.  13. Peak transaortic gradient equals 9.8 mmHg, mean transaortic gradient   equals 6.3 mmHg, the calculated aortic valve area equals 2.50 cm² by the   continuity equation consistent with mild aortic stenosis  < from: NM Pharm Stress Test, Dual Isotope (11.17.17 @ 14:07) >  IMPRESSION: Normal myocardial perfusion scan.    1. No scintigraphic evidence for myocardial infarct or ischemia.    2. There is normal left ventricular contractility, normal calculated   ejection fraction and normal myocardial thickening at rest. Overall post   stress ejection fraction was 73 %     3. Please see the cardiac test report for EKG findings and symptoms   during the procedure          Imaging Personally Reviewed:  [ X] YES  [ ] NO    Consultant(s) Notes Reviewed:  [X YES  [ ] NO    Care Discussed with Consultants/Other Providers [X ] YES  [ ] NO

## 2017-11-18 ENCOUNTER — TRANSCRIPTION ENCOUNTER (OUTPATIENT)
Age: 82
End: 2017-11-18

## 2017-11-18 VITALS
TEMPERATURE: 97 F | DIASTOLIC BLOOD PRESSURE: 66 MMHG | SYSTOLIC BLOOD PRESSURE: 126 MMHG | OXYGEN SATURATION: 98 % | HEART RATE: 69 BPM | RESPIRATION RATE: 16 BRPM

## 2017-11-18 LAB
ANION GAP SERPL CALC-SCNC: 5 MMOL/L — SIGNIFICANT CHANGE UP (ref 5–17)
BUN SERPL-MCNC: 15 MG/DL — SIGNIFICANT CHANGE UP (ref 7–23)
CALCIUM SERPL-MCNC: 8.8 MG/DL — SIGNIFICANT CHANGE UP (ref 8.5–10.1)
CHLORIDE SERPL-SCNC: 107 MMOL/L — SIGNIFICANT CHANGE UP (ref 96–108)
CO2 SERPL-SCNC: 30 MMOL/L — SIGNIFICANT CHANGE UP (ref 22–31)
CREAT SERPL-MCNC: 0.61 MG/DL — SIGNIFICANT CHANGE UP (ref 0.5–1.3)
GLUCOSE SERPL-MCNC: 116 MG/DL — HIGH (ref 70–99)
HCT VFR BLD CALC: 40.5 % — SIGNIFICANT CHANGE UP (ref 34.5–45)
HGB BLD-MCNC: 13 G/DL — SIGNIFICANT CHANGE UP (ref 11.5–15.5)
MAGNESIUM SERPL-MCNC: 2.2 MG/DL — SIGNIFICANT CHANGE UP (ref 1.6–2.6)
MCHC RBC-ENTMCNC: 31 PG — SIGNIFICANT CHANGE UP (ref 27–34)
MCHC RBC-ENTMCNC: 32.2 GM/DL — SIGNIFICANT CHANGE UP (ref 32–36)
MCV RBC AUTO: 96.2 FL — SIGNIFICANT CHANGE UP (ref 80–100)
PHOSPHATE SERPL-MCNC: 3 MG/DL — SIGNIFICANT CHANGE UP (ref 2.5–4.5)
PLATELET # BLD AUTO: 163 K/UL — SIGNIFICANT CHANGE UP (ref 150–400)
POTASSIUM SERPL-MCNC: 4.3 MMOL/L — SIGNIFICANT CHANGE UP (ref 3.5–5.3)
POTASSIUM SERPL-SCNC: 4.3 MMOL/L — SIGNIFICANT CHANGE UP (ref 3.5–5.3)
RBC # BLD: 4.21 M/UL — SIGNIFICANT CHANGE UP (ref 3.8–5.2)
RBC # FLD: 12.4 % — SIGNIFICANT CHANGE UP (ref 11–15)
SODIUM SERPL-SCNC: 142 MMOL/L — SIGNIFICANT CHANGE UP (ref 135–145)
WBC # BLD: 5.3 K/UL — SIGNIFICANT CHANGE UP (ref 3.8–10.5)
WBC # FLD AUTO: 5.3 K/UL — SIGNIFICANT CHANGE UP (ref 3.8–10.5)

## 2017-11-18 PROCEDURE — 93970 EXTREMITY STUDY: CPT | Mod: 26

## 2017-11-18 PROCEDURE — 99239 HOSP IP/OBS DSCHRG MGMT >30: CPT

## 2017-11-18 RX ORDER — ASPIRIN/CALCIUM CARB/MAGNESIUM 324 MG
1 TABLET ORAL
Qty: 30 | Refills: 0
Start: 2017-11-18 | End: 2017-12-18

## 2017-11-18 RX ADMIN — Medication 81 MILLIGRAM(S): at 12:18

## 2017-11-18 NOTE — DISCHARGE NOTE ADULT - MEDICATION SUMMARY - MEDICATIONS TO TAKE
I will START or STAY ON the medications listed below when I get home from the hospital:    aspirin 81 mg oral delayed release tablet  -- 1 tab(s) by mouth once a day  -- Indication: For Preventive measure    Zocor 20 mg oral tablet  -- 1 tab(s) by mouth once a day (at bedtime)  -- Indication: For Hypercholesteremia    amLODIPine 10 mg oral tablet  -- 1 tab(s) by mouth once a day (at bedtime)  -- Indication: For Essential hypertension

## 2017-11-18 NOTE — PHYSICAL THERAPY INITIAL EVALUATION ADULT - ADDITIONAL COMMENTS
Pt lives in a house with family with steps. Pt is a caretaker for  and perform household chores. Pt indep with ambulation/ADLs.

## 2017-11-18 NOTE — DISCHARGE NOTE ADULT - PLAN OF CARE
resolved please follow up with your PCP resolved please follow up with your PCP you may see a cardiologist stable please follow up with your cardiologist

## 2017-11-18 NOTE — DISCHARGE NOTE ADULT - HOSPITAL COURSE
History of Present Illness:  Chief Complaint/Reason for Admission: syncope	  History of Present Illness: 	  84 y/o F w hx of htn, hld, presents w near syncope episode today; patient reports she woke up this morning, went to the kitchen to take her usual morning coffee, and while in the kitchen, patient reports she felt dizzy, characterized as feeling lightheaded.  pt went to her bedroom, and laid down on the bed; patient denies syncope or LOC at any point; denies fall; her son called EMS; patient reports checking her blood pressure and her blood pressure was elevated so she came to the ED; NO headache, chest pain, palpitations, abd pain, focal weaknes or sensory changes; denies any other complaints on review of systems  Assessment and Plan:   · Assessment		  84 y/o F w hx of htn, hld, presents w near syncope today; reports she felt dizzy/lightheaded but denies syncope or LOC at any point  Reports checking her blood pressure and her blood pressure was elevated  (160s/115).  Feeling much better currently.  EKG unremarkable: sinus 83bpm with sinus arrhythmia; no ST-T wave changes  Head CT with no acute event.  Dk x 3 with trop 0.048-0.065  with neg CK    Likely HTN urgency.    -BP now controlled on Norvasc 10  C/w ASA  c/w statin for HLD  -2D echo with preserved EF  -stress test : No ischemia  - check Orthostatic BP  -Potassium PO for hypokalemia  f/u with Dr. Winters as outpatient    < from: NM Pharm Stress Test, Dual Isotope (11.17.17 @ 14:07) >  IMPRESSION: Normal myocardial perfusion scan.    ejection fraction and normal myocardial thickening at rest. Overall post   stress ejection fraction was 73 %     3. Please see the cardiac test report for EKG findings and symptoms   during the procedure      < from: US Duplex Venous Lower Ext Complete, Bilateral (11.18.17 @ 09:29) >    IMPRESSION:     No evidence of bilateral lower extremity deep venous thrombosis.    < end of copied text >  1. No scintigraphic evidence for myocardial infarct or ischemia.    2. There is normal left ventricular contractility, normal calculated   < from: CT Head No Cont (11.16.17 @ 11:52) >  IMPRESSION:    1)  chronic ischemic changes noted in both hemispheres with volume loss.   No acute abnormality suggested. Incidental a tortuous vertebrobasilar   system..  2)  no hemorrhagic foci or subdural hematoma seen.      Troponin I, Serum Repeat Every 8 Hours x 24 Hours (11.17.17 @ 16:53)    Troponin I, Serum: .059   Troponin I, Serum: .065:             Greater than 30 minutes spent preparing discharge was sent home on same medications with follow up with her doctor

## 2017-11-18 NOTE — DISCHARGE NOTE ADULT - CARE PLAN
Principal Discharge DX:	Near syncope  Goal:	resolved  Instructions for follow-up, activity and diet:	please follow up with your PCP  Secondary Diagnosis:	Elevated troponin  Goal:	resolved please follow up with your PCP you may see a cardiologist  Secondary Diagnosis:	Essential hypertension  Goal:	stable please follow up with your cardiologist

## 2017-11-18 NOTE — DISCHARGE NOTE ADULT - CARE PROVIDER_API CALL
Yoshi Winters (MD), Cardiovascular Disease; Internal Medicine  [address] [id number]  Phone: [phone]  Fax: [phone]

## 2017-11-18 NOTE — PHYSICAL THERAPY INITIAL EVALUATION ADULT - MODIFIED CLINICAL TEST OF SENSORY INTEGRATION IN BALANCE TEST
Barthel Index: Feeding Score  10__, Bathing Score _ 5__, Grooming Score _  5__, Dressing Score  10__, Bowels Score _   10__, Bladder Score _ 10__, Toilet Score   10__, Transfers Score _ 5  __, Mobility Score _0  __, Stairs Score  5  _,     Total Score ___

## 2017-11-18 NOTE — DISCHARGE NOTE ADULT - PATIENT PORTAL LINK FT
“You can access the Follow[employer] Patient Portal, offered by , by registering with the following website: [email or web address]”

## 2017-11-22 DIAGNOSIS — E87.6 HYPOKALEMIA: ICD-10-CM

## 2017-11-22 DIAGNOSIS — R55 SYNCOPE AND COLLAPSE: ICD-10-CM

## 2017-11-22 DIAGNOSIS — I16.0 HYPERTENSIVE URGENCY: ICD-10-CM

## 2017-11-22 DIAGNOSIS — I10 ESSENTIAL (PRIMARY) HYPERTENSION: ICD-10-CM

## 2017-11-22 DIAGNOSIS — R74.8 ABNORMAL LEVELS OF OTHER SERUM ENZYMES: ICD-10-CM

## 2017-11-22 DIAGNOSIS — E78.5 HYPERLIPIDEMIA, UNSPECIFIED: ICD-10-CM

## 2019-10-30 NOTE — ED ADULT NURSE NOTE - FALLEN IN THE PAST
[facility]  Heart Transplant  Progress Note    Patient Name: Deborah Navas  MRN: [id number]  Admission Date: 10/24/2019  Hospital Length of Stay: 6 days  Attending Physician: Eric Antunez Jr.,*  Primary Care Provider: Primary Doctor No  Principal Problem:Ventricular tachycardia    Subjective:     Interval History:   No further VT events. Continue Amiodarone 400 mg BID until 11/9 then QD, Mexiletine 150 mg TID.     No LFA overnight and BP at goal with Lisinopril 10 mg BID. Scr stable at 1.5 and will continue to hold Aldactone. Follow up Echo 10/29 with improving LVDD to 5.2 from 6.8. Septum is midline. Ao does not open.     -Will plan to DC today and see her in HF clinic in 2 weeks. Will see EP as outpatient as well.   -Will complete workup for listing as OP.    Continuous Infusions:  Scheduled Meds:   amiodarone  400 mg Oral BID    aspirin  325 mg Oral Daily    gabapentin  400 mg Oral BID    lisinopril  10 mg Oral BID    magnesium oxide  400 mg Oral Daily    mexiletine  150 mg Oral Q8H    mirtazapine  15 mg Oral QHS    pantoprazole  40 mg Oral Daily    venlafaxine  150 mg Oral Daily     PRN Meds:ALPRAZolam, bisacodyl, magnesium hydroxide 400 mg/5 ml, ondansetron, oxyCODONE-acetaminophen, senna-docusate 8.6-50 mg    Review of patient's allergies indicates:   Allergen Reactions    Adhesive Blisters     Reaction to area in chest and up only    Codeine Itching     Objective:     Vital Signs (Most Recent):  Temp: 98.1 °F (36.7 °C) (10/30/19 0758)  Pulse: 77 (10/30/19 0758)  Resp: 18 (10/30/19 0758)  BP: (!) 86/0 (10/30/19 0758)  SpO2: 97 % (10/30/19 0758) Vital Signs (24h Range):  Temp:  [97.5 °F (36.4 °C)-98.8 °F (37.1 °C)] 98.1 °F (36.7 °C)  Pulse:  [] 77  Resp:  [18-20] 18  SpO2:  [93 %-99 %] 97 %  BP: ()/(0-81) 86/0     Patient Vitals for the past 72 hrs (Last 3 readings):   Weight   10/30/19 0700 105.9 kg (233 lb 7.5 oz)   10/30/19 0432 106.6 kg (235 lb 0.2  oz)   10/29/19 1537 102.5 kg (226 lb)     Body mass index is 36.57 kg/m².      Intake/Output Summary (Last 24 hours) at 10/30/2019 1021  Last data filed at 10/30/2019 0700  Gross per 24 hour   Intake 630 ml   Output 2100 ml   Net -1470 ml       Hemodynamic Parameters:       Telemetry: No events    Physical Exam   Constitutional: She is oriented to person, place, and time. She appears well-developed and well-nourished.   HENT:   Mouth/Throat: Oropharynx is clear and moist.   Eyes: EOM are normal.   Neck: No JVD present.   Cardiovascular: Normal rate, regular rhythm and intact distal pulses.   Smooth VAD hum   Pulmonary/Chest: Effort normal and breath sounds normal. No respiratory distress. She has no rales.   Abdominal: Soft. Bowel sounds are normal. She exhibits no distension. There is no tenderness. There is no guarding.   Musculoskeletal: She exhibits no edema.   Neurological: She is alert and oriented to person, place, and time.   Skin: Skin is warm.   Psychiatric: She has a normal mood and affect.   Nursing note and vitals reviewed.      Significant Labs:  CBC:  Recent Labs   Lab 10/28/19  0659 10/29/19  0346 10/30/19  0429   WBC 4.88 6.69 4.05   RBC 4.15 4.16 3.94*   HGB 10.7* 10.7* 9.9*   HCT 36.6* 36.3* 35.5*    373* 349   MCV 88 87 90   MCH 25.8* 25.7* 25.1*   MCHC 29.2* 29.5* 27.9*     BNP:  Recent Labs   Lab 10/25/19  0402 10/28/19  0659 10/30/19  0429   * 376* 260*     CMP:  Recent Labs   Lab 10/25/19  0403  10/28/19  0659 10/29/19  0345 10/30/19  0429   *   < > 116* 115* 93   CALCIUM 9.3   < > 9.4 9.2 8.9   ALBUMIN 3.1*  --  3.4*  --  3.4*   PROT 6.7  --  7.0  --  7.0      < > 144 140 141   K 3.6   < > 4.5 4.6 4.1   CO2 24   < > 27 24 24      < > 108 106 107   BUN 21*   < > 19 23* 24*   CREATININE 1.4   < > 1.3 1.5* 1.5*   ALKPHOS 83  --  88  --  91   ALT 13  --  13  --  15   AST 16  --  14  --  16   BILITOT 0.4  --  0.6  --  0.4    < > = values in this interval not  displayed.      Coagulation:   Recent Labs   Lab 10/28/19  0659 10/29/19  0346 10/30/19  0429   INR 2.6* 3.4* 2.6*   APTT 32.9* 33.7* 34.2*     LDH:  Recent Labs   Lab 10/28/19  0659 10/29/19  0345 10/30/19  0429    276* 244     Microbiology:  Microbiology Results (last 7 days)     ** No results found for the last 168 hours. **          BMP:   Recent Labs   Lab 10/30/19  0429   GLU 93      K 4.1      CO2 24   BUN 24*   CREATININE 1.5*   CALCIUM 8.9   MG 2.5     Cardiac Markers: No results for input(s): CKMB, TROPONINT, MYOGLOBIN in the last 72 hours.  Coagulation:   Recent Labs   Lab 10/30/19  0429   INR 2.6*   APTT 34.2*     I have reviewed all pertinent labs within the past 24 hours.    Estimated Creatinine Clearance: 56.2 mL/min (A) (based on SCr of 1.5 mg/dL (H)).    Diagnostic Results:  I have reviewed all pertinent imaging results/findings within the past 24 hours.    Assessment and Plan:     49 yo WF with NICM, s/p HM3 implantation 9/10/19 (post up coarse uncomplicated with the exception of+ diaphragmatic stimulation of LV lead and pleural effusion with chest tube placement, atrial flutter with NADINE/DCCV), V-fib reported in setting of hypokalemia with appropriate shock from ICD on Amiodarone prior to LVAD placement. Transferred from outside hospital for ICD shock x2 at home.     Recently admitted from 10/20-10/22 also for ICD shock. EP was consulted who deemed her ICD shock appropriate for MMVT. Patient was IV loaded with amiodarone and transitioned to PO prior to discharge. Lasix was decreased from QD to EOD due to concerns for hypovolemia. Her driveline was cultured due to pain and minimal drainage, no growth on cultures.     On 10/23, she was sitting down watching TV when she suddenly felt a jumping sensation in her left lower chest followed by a shock. Denies LOC, dizziness or palpitations prior to this episode. After the shock she felt slightly nauseous. She called the clinic and was  advised to go to ER if she had a second shock. Today she had a similar episode around 12 pm and went to her local ED. Otherwise doing well since discharged on Tuesday.     * Ventricular tachycardia  - Patient recently discharged after an episode of VT required ICD shock.   - Now readmitted after two ICD shocks. Per admission -ICD interrogation showed an episode of VT on 10/23 at 8:57 pm and one episode of VT->VF on 10/24 at 12:38 pm.   - Electrolytes WNL at time of event. Bedside echo with LV unchanged in size (no collapse).   - Loaded with IV amio, 10/24 -10/26. First dose of amiodarone 400 bid on 10/26 -> 2 weeks then on 11/09 change to 400 mg daily . Mexitil 150 mg TID.  - Appreciate EP help who will follow as OP  - Will get RHC as OP in effort to list  patient for transplant (recurrence of VT)     Essential hypertension  - On Lisinopril 10 BID   - Goal - doppler 60-90 mmHg    LVAD (left ventricular assist device) present  -HeartMate 3 Implanted 9/10/19 as DT.  -Implanted by Dr. Walden  -INR 2.6 from 2.6. At home on 1.5 mg MWF and 3 mg TTSS Goal INR 2.0-3.0. W  -Antiplatelets:  mg.  -LDH stable  -Speed set at 5200.  -Not listed for OHTx: was declined due to pulmonary hypertension. Will get RHC in effort to move forward with listing.        Procedure: Device Interrogation Including analysis of device parameters  Current Settings: Ventricular Assist Device  Review of device function is stable    TXP LVAD INTERROGATIONS 10/30/2019 10/30/2019 10/30/2019 10/29/2019 10/29/2019 10/29/2019 10/29/2019   Type HeartMate3 HeartMate3 HeartMate3 HeartMate3 HeartMate3 HeartMate3 HeartMate3   Flow 3.8 4.2 3.7 3.5 3.7 3.7 4.5   Speed 5300 5300 5300 5300 5300 5300 5300   PI 3.9 3.4 2.9 5.8 4.0 4.4 2.3   Power ([place]) 3.6 3.7 3.6 3.6 3.7 3.6 3.8   LSL 4900 - - 4900 4900 4900 4900   Pulsatility Intermittent pulse Intermittent pulse Intermittent pulse Pulse - - -       Chronic systolic congestive heart failure  -Euvolemic on  exam. Takes furosemide every other day. Held on admit  -Continue Lisinopril 10 mg BID  -Hold Spironolactone     Ventricular fibrillation  - see VT        Dyllan Herzog MD  Heart Transplant  [facility]   yes

## 2020-03-20 NOTE — PHYSICAL THERAPY INITIAL EVALUATION ADULT - LEVEL OF INDEPENDENCE: BED TO CHAIR, REHAB EVAL
Hepatobiliary and Pancreatic Surgery Progress Note    Patient encounter was performed via telephone secondary to COVID-19    CC: acute pancreatitis with pancreatic neck acute necrotic collection at 7cm    Subjective: Patient doing well states that he is having no pain. Denies any early satiety and also denies any diarrhea. He overall feels well. He is not drinking any alcohol. ASSESSMENT: Acute necrotizing pancreatitis secondary to gallstones with post ERCP pancreatitis with an acute necrotic collection at the pancreatic neck at 7cm    PLAN:    - continue creon  - we discussed that he is having no symptoms however he could develop pancreatitis again  - we discussed that if he begins to have pain or early satiety then we should re-evaluate his pancreas  - however if he is having no pain then watchful waiting would be best as this was his first episode  - will plan for a MRI in December  - he is in agreement with this plan    Thank you for the consultation and allowing me to take part in Mr. Gabriela song.     Please send a copy of my note to Dr.'s Christos Mcnally and [address] 3/20/2020 3:09 PM
contact guard

## 2022-08-04 ENCOUNTER — TRANSCRIPTION ENCOUNTER (OUTPATIENT)
Age: 87
End: 2022-08-04

## 2022-08-04 ENCOUNTER — INPATIENT (INPATIENT)
Facility: HOSPITAL | Age: 87
LOS: 6 days | Discharge: OTHER INSTITUTION | DRG: 23 | End: 2022-08-11
Attending: HOSPITALIST | Admitting: INTERNAL MEDICINE
Payer: MEDICARE

## 2022-08-04 ENCOUNTER — APPOINTMENT (OUTPATIENT)
Dept: NEUROLOGY | Facility: HOSPITAL | Age: 87
End: 2022-08-04

## 2022-08-04 VITALS — WEIGHT: 149.25 LBS

## 2022-08-04 DIAGNOSIS — I63.9 CEREBRAL INFARCTION, UNSPECIFIED: ICD-10-CM

## 2022-08-04 PROBLEM — Z00.00 ENCOUNTER FOR PREVENTIVE HEALTH EXAMINATION: Status: ACTIVE | Noted: 2022-08-04

## 2022-08-04 LAB — BLD GP AB SCN SERPL QL: SIGNIFICANT CHANGE UP

## 2022-08-04 PROCEDURE — 70460 CT HEAD/BRAIN W/DYE: CPT | Mod: 26

## 2022-08-04 PROCEDURE — 61645 PERQ ART M-THROMBECT &/NFS: CPT | Mod: LT

## 2022-08-04 RX ORDER — DEXMEDETOMIDINE HYDROCHLORIDE IN 0.9% SODIUM CHLORIDE 4 UG/ML
0.2 INJECTION INTRAVENOUS
Qty: 200 | Refills: 0 | Status: DISCONTINUED | OUTPATIENT
Start: 2022-08-04 | End: 2022-08-05

## 2022-08-04 RX ORDER — NICARDIPINE HYDROCHLORIDE 30 MG/1
5 CAPSULE, EXTENDED RELEASE ORAL
Qty: 40 | Refills: 0 | Status: DISCONTINUED | OUTPATIENT
Start: 2022-08-04 | End: 2022-08-05

## 2022-08-04 RX ORDER — CHLORHEXIDINE GLUCONATE 213 G/1000ML
1 SOLUTION TOPICAL DAILY
Refills: 0 | Status: DISCONTINUED | OUTPATIENT
Start: 2022-08-04 | End: 2022-08-11

## 2022-08-04 RX ORDER — SODIUM CHLORIDE 9 MG/ML
1000 INJECTION INTRAMUSCULAR; INTRAVENOUS; SUBCUTANEOUS
Refills: 0 | Status: DISCONTINUED | OUTPATIENT
Start: 2022-08-04 | End: 2022-08-06

## 2022-08-04 RX ADMIN — SODIUM CHLORIDE 75 MILLILITER(S): 9 INJECTION INTRAMUSCULAR; INTRAVENOUS; SUBCUTANEOUS at 21:49

## 2022-08-04 RX ADMIN — NICARDIPINE HYDROCHLORIDE 25 MG/HR: 30 CAPSULE, EXTENDED RELEASE ORAL at 21:49

## 2022-08-04 RX ADMIN — DEXMEDETOMIDINE HYDROCHLORIDE IN 0.9% SODIUM CHLORIDE 2.69 MICROGRAM(S)/KG/HR: 4 INJECTION INTRAVENOUS at 21:50

## 2022-08-04 NOTE — CHART NOTE - NSCHARTNOTEFT_GEN_A_CORE
Neurointerventional Surgery  Pre-Procedure Note     This is a 88y Female    HPI: 88F last known well reported 2:15pm, found to be aphasic with right sided weakness, presented to [facility] as code stroke, CT shows L MCA occlusion given TPA at 4:05pm  and transferred for thrombectomy.      Allergies: No Known Allergies      PMH/PSH:  PAST MEDICAL & SURGICAL HISTORY:  HTN (hypertension)      Hypercholesteremia      No significant past surgical history      FAMILY HISTORY:  No pertinent family history in first degree relatives        Current Medications:       NIH SS:  DATE: 8/4  TIME: 6:25pm  1A: Level of consciousness (0-3): 1  1B: Questions (0-2): 1  1C: Commands (0-2): 2  2: Gaze (0-2): 0  3: Visual fields (0-3): 0  4: Facial palsy (0-3): 0  MOTOR:  5A: Left arm motor drift (0-4): 2  5B: Right arm motor drift (0-4): 4  6A: Left leg motor drift (0-4): 4  6B: Right leg motor drift (0-4): 4  7: Limb ataxia (0-2): 0  SENSORY:  8: Sensation (0-2): 1  SPEECH:  9: Language (0-3): 3  10: Dysarthria (0-2): 0  EXTINCTION:  11: Extinction/inattention (0-2): 1    TOTAL SCORE: 23    Labs:                       Blood Bank:         Assessment/Plan:   This is a 88y  year old  Female  presents with L MCA occlusion   Patient presents to neuro-IR for selective cerebral angiography.     Procedure, goals, risks, benefits and alternatives  were discussed with patient and (patient's family).  All questions were answered.  Risks include but are not limited to stroke, vessel injury, hemorrhage, and or groin hematoma.  Patient demonstrates understanding  of all risks involved with this procedure and wishes to continue.   Appropriate  content was obtained from patient and consent is in the patient's chart.

## 2022-08-04 NOTE — DISCHARGE NOTE NURSING/CASE MANAGEMENT/SOCIAL WORK - PATIENT PORTAL LINK FT
You can access the FollowMyHealth Patient Portal offered by [facility] by registering at the following website: [email or web address]. By joining [email or web address]’s FollowMyHealth portal, you will also be able to view your health information using other applications (apps) compatible with our system.

## 2022-08-04 NOTE — CHART NOTE - NSCHARTNOTEFT_GEN_A_CORE
Neurointerventional Surgery Post Procedure Note    Procedure: Selective Cerebral Angiography     Pre- Procedure Diagnosis: CVA  Post- Procedure Diagnosis:     Pre- Procedure Diagnosis: Arteriovenous malformation  Post- Procedure Diagnosis: Arteriovenous malformation    Pre- Procedure Diagnosis: Stroke   Post- Procedure Diagnosis: Stroke    : Dr. Chadd MD  Physician Assistant:   Nurse: RN  Anesthesiologist: Dr Harrell Tech:    Sheath:  - Telugu Shealth    I/Os: estimated blood loss less than 10cc,  IV fluids    cc,   Urine out put    cc,   Contrast: Omnipaque 240     cc,  ABX    Vitals:BP   HR   Spo2  %    Preliminary Report:  Under  a  4 Telugu short sheath via the right groin under MAC sedation  via left vertebral artery,  left interntal carotid artery, left external carotid artey, right vertebral artery, right internal carotid artery,  right external carotid artery, a selective cerebral angiography  was perofrmed and reveals                                                                                                        . ( Official note to follow).    Patient tolerated procedure well.  Patient remains hemodynamically stable, no change in neurological status compared to baseline.  Results were discussed with patient, patient's family and Neurosurgery.  Right groin sheath  was discontinued.   Hemostasis was obtained with approximately 21 minutes of manual compression.     No active bleeding, no hematoma, no ecchymosis.   Angio-seal device was applied, quick clot and safeguard balloon dressing applied at   Patient transfered to recovery room in stable condition. Neurointerventional Surgery Post Procedure Note    Procedure: Selective Cerebral Angiography     Pre- Procedure Diagnosis: CVA, left M1 occlusion   Post- Procedure Diagnosis: s/p mechanical thrombectomy, resultant TICI 3    : Dr. Yoshi Jimenez MD, Dr Tyson Beaver MD  Nurse: RN La Nena Dugan, ZULEMA Hector  Anesthesiologist: Dr Rakan Elliott MD, SIMONE Avilesrera                                          Radiology Tech: David Rucker RT, Prosper Ríos RT    Sheath: 5 Hebrew long Sheath    I/Os: estimated blood loss less than 10cc,  IV fluids 200 cc, Urine out put 0 cc,  Contrast: Omnipaque 240 50 cc    Vitals: /53 HR 64 Spo2 97 %    Preliminary Report:  Under a 5 Hebrew long sheath via the right groin under general anesthesia via left internal carotid artery, a mechanical thrombectomy was performed for left M1 occlusion with resultant TICI 3. (Official note to follow).    Patient tolerated procedure well. Patient remains hemodynamically stable, with improving exam off sedation.   Results were discussed with patient and patient's family.   Right groin sheath was discontinued. Hemostasis was obtained with approximately 15 minutes of manual compression.     No active bleeding, no hematoma, no ecchymosis. Angio-seal device was applied, quick clot and safeguard balloon dressing applied at 19:11.    Patient transferred to NCCU in stable condition.

## 2022-08-04 NOTE — H&P ADULT - ASSESSMENT
88F with L MCA occlusion NIH 23, MRS 0    Plan   Neuro:   - q1 neuro checks   - post thrombectomy care / post tpa care   - ct 24 hours post tpa or stat if change in exam   - MRI non con     Cards:  - -180  - Echo / EKG   - Lipid panel     Resp:   - Vent  - Chest x-ray   - ABG     GI:  - NPO  - Oral access if remains intubated   - Dysphagia if extubated     :   - [place]   - NS at 75/hr     ID:   - No active issues reported will need cbc     Heme:  - SCDs  - ASA / Lovenox if 24 hr scan stable and no concern for large stroke / hemorrhagic conversion     Endo:  - A1C     Dispo: NSICU

## 2022-08-04 NOTE — DISCHARGE NOTE NURSING/CASE MANAGEMENT/SOCIAL WORK - NSDCPEFALRISK_GEN_ALL_CORE
For information on Fall & Injury Prevention, visit: [email or web address] OR  [email or web address] OR  https://www.cdc.gov/steadi/patient.html

## 2022-08-04 NOTE — H&P ADULT - NSHPPHYSICALEXAM_GEN_ALL_CORE
NIH SS:  DATE: 8/4  TIME: 6:25pm  1A: Level of consciousness (0-3): 1  1B: Questions (0-2): 1  1C: Commands (0-2): 2  2: Gaze (0-2): 0  3: Visual fields (0-3): 0  4: Facial palsy (0-3): 0  MOTOR:  5A: Left arm motor drift (0-4): 2  5B: Right arm motor drift (0-4): 4  6A: Left leg motor drift (0-4): 4  6B: Right leg motor drift (0-4): 4  7: Limb ataxia (0-2): 0  SENSORY:  8: Sensation (0-2): 1  SPEECH:  9: Language (0-3): 3  10: Dysarthria (0-2): 0  EXTINCTION:  11: Extinction/inattention (0-2): 1    TOTAL SCORE: 23    Head: Atraumatic   Resp: intubated, clear breath sounds b/l   GI: soft, non distened   : female, chamorro   Ext: no deformity, no edema, no eccymosis   Neuro: See NIH

## 2022-08-04 NOTE — H&P ADULT - HISTORY OF PRESENT ILLNESS
88F last known well at 2:15pm, found around 330pm to have aphasia and right sided weakness. Presents to [facility] as code stroke found to have L MCA occlusion. Given TPA at 4:05pm and intubated for airway protection. Transferred to [facility] for mechanical thrombectomy, NIH 23, MRS 0

## 2022-08-05 PROBLEM — E78.00 PURE HYPERCHOLESTEROLEMIA, UNSPECIFIED: Chronic | Status: ACTIVE | Noted: 2017-11-16

## 2022-08-05 PROBLEM — I10 ESSENTIAL (PRIMARY) HYPERTENSION: Chronic | Status: ACTIVE | Noted: 2017-11-16

## 2022-08-05 LAB
A1C WITH ESTIMATED AVERAGE GLUCOSE RESULT: 5.7 % — HIGH (ref 4–5.6)
ANION GAP SERPL CALC-SCNC: 13 MMOL/L — SIGNIFICANT CHANGE UP (ref 5–17)
APTT BLD: 29.2 SEC — SIGNIFICANT CHANGE UP (ref 27.5–35.5)
BASE EXCESS BLDA CALC-SCNC: 4 MMOL/L — HIGH (ref -2–3)
BASE EXCESS BLDA CALC-SCNC: 4.9 MMOL/L — HIGH (ref -2–3)
BLOOD GAS COMMENTS ARTERIAL: SIGNIFICANT CHANGE UP
BLOOD GAS COMMENTS ARTERIAL: SIGNIFICANT CHANGE UP
BUN SERPL-MCNC: 11.8 MG/DL — SIGNIFICANT CHANGE UP (ref 8–20)
CALCIUM SERPL-MCNC: 8.3 MG/DL — LOW (ref 8.4–10.5)
CHLORIDE SERPL-SCNC: 101 MMOL/L — SIGNIFICANT CHANGE UP (ref 98–107)
CHOLEST SERPL-MCNC: 154 MG/DL — SIGNIFICANT CHANGE UP
CO2 SERPL-SCNC: 24 MMOL/L — SIGNIFICANT CHANGE UP (ref 22–29)
CREAT SERPL-MCNC: 0.64 MG/DL — SIGNIFICANT CHANGE UP (ref 0.5–1.3)
EGFR: 85 ML/MIN/1.73M2 — SIGNIFICANT CHANGE UP
ESTIMATED AVERAGE GLUCOSE: 117 MG/DL — HIGH (ref 68–114)
GAS PNL BLDA: SIGNIFICANT CHANGE UP
GLUCOSE SERPL-MCNC: 147 MG/DL — HIGH (ref 70–99)
HCO3 BLDA-SCNC: 28 MMOL/L — SIGNIFICANT CHANGE UP (ref 21–28)
HCO3 BLDA-SCNC: 28 MMOL/L — SIGNIFICANT CHANGE UP (ref 21–28)
HCT VFR BLD CALC: 33.5 % — LOW (ref 34.5–45)
HDLC SERPL-MCNC: 65 MG/DL — SIGNIFICANT CHANGE UP
HGB BLD-MCNC: 11.2 G/DL — LOW (ref 11.5–15.5)
HOROWITZ INDEX BLDA+IHG-RTO: 30 — SIGNIFICANT CHANGE UP
HOROWITZ INDEX BLDA+IHG-RTO: SIGNIFICANT CHANGE UP
INR BLD: 1.44 RATIO — HIGH (ref 0.88–1.16)
LIPID PNL WITH DIRECT LDL SERPL: 72 MG/DL — SIGNIFICANT CHANGE UP
MAGNESIUM SERPL-MCNC: 1.7 MG/DL — SIGNIFICANT CHANGE UP (ref 1.6–2.6)
MCHC RBC-ENTMCNC: 30.6 PG — SIGNIFICANT CHANGE UP (ref 27–34)
MCHC RBC-ENTMCNC: 33.4 GM/DL — SIGNIFICANT CHANGE UP (ref 32–36)
MCV RBC AUTO: 91.5 FL — SIGNIFICANT CHANGE UP (ref 80–100)
MRSA PCR RESULT.: SIGNIFICANT CHANGE UP
NON HDL CHOLESTEROL: 89 MG/DL — SIGNIFICANT CHANGE UP
PCO2 BLDA: 35 MMHG — SIGNIFICANT CHANGE UP (ref 32–35)
PCO2 BLDA: 38 MMHG — HIGH (ref 32–35)
PH BLDA: 7.47 — HIGH (ref 7.35–7.45)
PH BLDA: 7.51 — HIGH (ref 7.35–7.45)
PHOSPHATE SERPL-MCNC: 3.3 MG/DL — SIGNIFICANT CHANGE UP (ref 2.4–4.7)
PLATELET # BLD AUTO: 157 K/UL — SIGNIFICANT CHANGE UP (ref 150–400)
PO2 BLDA: 148 MMHG — HIGH (ref 83–108)
PO2 BLDA: 155 MMHG — HIGH (ref 83–108)
POTASSIUM SERPL-MCNC: 3.5 MMOL/L — SIGNIFICANT CHANGE UP (ref 3.5–5.3)
POTASSIUM SERPL-SCNC: 3.5 MMOL/L — SIGNIFICANT CHANGE UP (ref 3.5–5.3)
PROTHROM AB SERPL-ACNC: 16.8 SEC — HIGH (ref 10.5–13.4)
RBC # BLD: 3.66 M/UL — LOW (ref 3.8–5.2)
RBC # FLD: 13.6 % — SIGNIFICANT CHANGE UP (ref 10.3–14.5)
S AUREUS DNA NOSE QL NAA+PROBE: SIGNIFICANT CHANGE UP
SAO2 % BLDA: 100 % — HIGH (ref 94–98)
SAO2 % BLDA: 99.8 % — HIGH (ref 94–98)
SODIUM SERPL-SCNC: 138 MMOL/L — SIGNIFICANT CHANGE UP (ref 135–145)
TRIGL SERPL-MCNC: 86 MG/DL — SIGNIFICANT CHANGE UP
TSH SERPL-MCNC: 2.41 UIU/ML — SIGNIFICANT CHANGE UP (ref 0.27–4.2)
WBC # BLD: 11.75 K/UL — HIGH (ref 3.8–10.5)
WBC # FLD AUTO: 11.75 K/UL — HIGH (ref 3.8–10.5)

## 2022-08-05 PROCEDURE — 71045 X-RAY EXAM CHEST 1 VIEW: CPT | Mod: 26

## 2022-08-05 PROCEDURE — 93306 TTE W/DOPPLER COMPLETE: CPT | Mod: 26

## 2022-08-05 PROCEDURE — 70450 CT HEAD/BRAIN W/O DYE: CPT | Mod: 26

## 2022-08-05 PROCEDURE — 93010 ELECTROCARDIOGRAM REPORT: CPT

## 2022-08-05 PROCEDURE — 99222 1ST HOSP IP/OBS MODERATE 55: CPT

## 2022-08-05 PROCEDURE — 99291 CRITICAL CARE FIRST HOUR: CPT

## 2022-08-05 RX ORDER — ENOXAPARIN SODIUM 100 MG/ML
40 INJECTION SUBCUTANEOUS EVERY 24 HOURS
Refills: 0 | Status: DISCONTINUED | OUTPATIENT
Start: 2022-08-05 | End: 2022-08-11

## 2022-08-05 RX ORDER — SIMVASTATIN 20 MG/1
1 TABLET, FILM COATED ORAL
Qty: 0 | Refills: 0 | DISCHARGE

## 2022-08-05 RX ORDER — POTASSIUM CHLORIDE 20 MEQ
10 PACKET (EA) ORAL
Refills: 0 | Status: COMPLETED | OUTPATIENT
Start: 2022-08-05 | End: 2022-08-05

## 2022-08-05 RX ORDER — DEXTROSE 50 % IN WATER 50 %
25 SYRINGE (ML) INTRAVENOUS ONCE
Refills: 0 | Status: DISCONTINUED | OUTPATIENT
Start: 2022-08-05 | End: 2022-08-05

## 2022-08-05 RX ORDER — ATORVASTATIN CALCIUM 80 MG/1
20 TABLET, FILM COATED ORAL AT BEDTIME
Refills: 0 | Status: DISCONTINUED | OUTPATIENT
Start: 2022-08-05 | End: 2022-08-11

## 2022-08-05 RX ORDER — DEXTROSE 50 % IN WATER 50 %
12.5 SYRINGE (ML) INTRAVENOUS ONCE
Refills: 0 | Status: DISCONTINUED | OUTPATIENT
Start: 2022-08-05 | End: 2022-08-05

## 2022-08-05 RX ORDER — SODIUM CHLORIDE 9 MG/ML
1000 INJECTION, SOLUTION INTRAVENOUS
Refills: 0 | Status: DISCONTINUED | OUTPATIENT
Start: 2022-08-05 | End: 2022-08-05

## 2022-08-05 RX ORDER — DEXTROSE 50 % IN WATER 50 %
15 SYRINGE (ML) INTRAVENOUS ONCE
Refills: 0 | Status: DISCONTINUED | OUTPATIENT
Start: 2022-08-05 | End: 2022-08-05

## 2022-08-05 RX ORDER — FENTANYL CITRATE 50 UG/ML
25 INJECTION INTRAVENOUS ONCE
Refills: 0 | Status: DISCONTINUED | OUTPATIENT
Start: 2022-08-05 | End: 2022-08-05

## 2022-08-05 RX ORDER — ASPIRIN/CALCIUM CARB/MAGNESIUM 324 MG
81 TABLET ORAL DAILY
Refills: 0 | Status: DISCONTINUED | OUTPATIENT
Start: 2022-08-05 | End: 2022-08-11

## 2022-08-05 RX ORDER — CHLORHEXIDINE GLUCONATE 213 G/1000ML
15 SOLUTION TOPICAL EVERY 12 HOURS
Refills: 0 | Status: DISCONTINUED | OUTPATIENT
Start: 2022-08-05 | End: 2022-08-05

## 2022-08-05 RX ORDER — PROPOFOL 10 MG/ML
10 INJECTION, EMULSION INTRAVENOUS
Qty: 1000 | Refills: 0 | Status: DISCONTINUED | OUTPATIENT
Start: 2022-08-05 | End: 2022-08-05

## 2022-08-05 RX ORDER — GLUCAGON INJECTION, SOLUTION 0.5 MG/.1ML
1 INJECTION, SOLUTION SUBCUTANEOUS ONCE
Refills: 0 | Status: DISCONTINUED | OUTPATIENT
Start: 2022-08-05 | End: 2022-08-05

## 2022-08-05 RX ORDER — SODIUM CHLORIDE 9 MG/ML
500 INJECTION INTRAMUSCULAR; INTRAVENOUS; SUBCUTANEOUS ONCE
Refills: 0 | Status: COMPLETED | OUTPATIENT
Start: 2022-08-05 | End: 2022-08-05

## 2022-08-05 RX ORDER — INSULIN LISPRO 100/ML
VIAL (ML) SUBCUTANEOUS EVERY 6 HOURS
Refills: 0 | Status: DISCONTINUED | OUTPATIENT
Start: 2022-08-05 | End: 2022-08-05

## 2022-08-05 RX ORDER — PANTOPRAZOLE SODIUM 20 MG/1
40 TABLET, DELAYED RELEASE ORAL DAILY
Refills: 0 | Status: DISCONTINUED | OUTPATIENT
Start: 2022-08-05 | End: 2022-08-06

## 2022-08-05 RX ORDER — MAGNESIUM SULFATE 500 MG/ML
1 VIAL (ML) INJECTION ONCE
Refills: 0 | Status: COMPLETED | OUTPATIENT
Start: 2022-08-05 | End: 2022-08-05

## 2022-08-05 RX ORDER — ACETAMINOPHEN 500 MG
1000 TABLET ORAL ONCE
Refills: 0 | Status: COMPLETED | OUTPATIENT
Start: 2022-08-05 | End: 2022-08-05

## 2022-08-05 RX ORDER — AMLODIPINE BESYLATE 2.5 MG/1
1 TABLET ORAL
Qty: 0 | Refills: 0 | DISCHARGE

## 2022-08-05 RX ORDER — METOPROLOL TARTRATE 50 MG
25 TABLET ORAL
Refills: 0 | Status: DISCONTINUED | OUTPATIENT
Start: 2022-08-05 | End: 2022-08-11

## 2022-08-05 RX ADMIN — CHLORHEXIDINE GLUCONATE 15 MILLILITER(S): 213 SOLUTION TOPICAL at 06:04

## 2022-08-05 RX ADMIN — SODIUM CHLORIDE 75 MILLILITER(S): 9 INJECTION INTRAMUSCULAR; INTRAVENOUS; SUBCUTANEOUS at 10:35

## 2022-08-05 RX ADMIN — Medication 25 MILLIGRAM(S): at 17:24

## 2022-08-05 RX ADMIN — Medication 100 GRAM(S): at 06:03

## 2022-08-05 RX ADMIN — ENOXAPARIN SODIUM 40 MILLIGRAM(S): 100 INJECTION SUBCUTANEOUS at 17:24

## 2022-08-05 RX ADMIN — FENTANYL CITRATE 25 MICROGRAM(S): 50 INJECTION INTRAVENOUS at 16:29

## 2022-08-05 RX ADMIN — PANTOPRAZOLE SODIUM 40 MILLIGRAM(S): 20 TABLET, DELAYED RELEASE ORAL at 11:28

## 2022-08-05 RX ADMIN — Medication 400 MILLIGRAM(S): at 19:37

## 2022-08-05 RX ADMIN — ATORVASTATIN CALCIUM 20 MILLIGRAM(S): 80 TABLET, FILM COATED ORAL at 20:59

## 2022-08-05 RX ADMIN — CHLORHEXIDINE GLUCONATE 1 APPLICATION(S): 213 SOLUTION TOPICAL at 11:29

## 2022-08-05 RX ADMIN — Medication 100 MILLIEQUIVALENT(S): at 07:38

## 2022-08-05 RX ADMIN — Medication 100 MILLIEQUIVALENT(S): at 10:00

## 2022-08-05 RX ADMIN — FENTANYL CITRATE 25 MICROGRAM(S): 50 INJECTION INTRAVENOUS at 15:34

## 2022-08-05 RX ADMIN — Medication 1000 MILLIGRAM(S): at 20:23

## 2022-08-05 RX ADMIN — SODIUM CHLORIDE 500 MILLILITER(S): 9 INJECTION INTRAMUSCULAR; INTRAVENOUS; SUBCUTANEOUS at 15:35

## 2022-08-05 RX ADMIN — Medication 100 MILLIEQUIVALENT(S): at 06:03

## 2022-08-05 RX ADMIN — PROPOFOL 3.22 MICROGRAM(S)/KG/MIN: 10 INJECTION, EMULSION INTRAVENOUS at 01:57

## 2022-08-05 RX ADMIN — SODIUM CHLORIDE 75 MILLILITER(S): 9 INJECTION INTRAMUSCULAR; INTRAVENOUS; SUBCUTANEOUS at 15:33

## 2022-08-05 RX ADMIN — Medication 81 MILLIGRAM(S): at 17:24

## 2022-08-05 NOTE — CONSULT NOTE ADULT - ATTENDING COMMENTS
88y Female with functional deficits after CVA s/p mechanical thrombectomy  Consult PT/OT and SLP for evaluation when medically appropriate  Rehabilitation team will continue to follow for further recommendations as patient's condition progresses.

## 2022-08-05 NOTE — PROGRESS NOTE ADULT - SUBJECTIVE AND OBJECTIVE BOX
Interval events:  89 yo woman with HTN, HLD and breast ca, mRS 0 at baseline, NIHSS 23, s/p tPA. Initial CTA with supraclinoid carotid occlusion, with angiogram showing an M1 occlusion s/p TICI 3 recanalization.     HgA1C 5.7    VITALS:  T(C): , Max: 38.1 (08-05-22 @ 08:11)  HR:  (57 - 77)  BP:  (106/57 - 152/80)  ABP: --  RR:  (10 - 20)  SpO2:  (100% - 100%)  Wt(kg): --  Device: Avea, Mode: AC/ CMV (Assist Control/ Continuous Mandatory Ventilation), RR (machine): 10, TV (machine): 450, FiO2: 30, PEEP: 5, MAP: 9, PIP: 26    08-04-22 @ 07:01  -  08-05-22 @ 07:00  --------------------------------------------------------  IN: 1362 mL / OUT: 650 mL / NET: 712 mL    08-05-22 @ 07:01  -  08-05-22 @ 10:17  --------------------------------------------------------  IN: 326.6 mL / OUT: 0 mL / NET: 326.6 mL      LABS:  Na: 138 (08-05 @ 03:20)  K: 3.5 (08-05 @ 03:20)  Cl: 101 (08-05 @ 03:20)  CO2: 24.0 (08-05 @ 03:20)  BUN: 11.8 (08-05 @ 03:20)  Cr: 0.64 (08-05 @ 03:20)  Glu: 147(08-05 @ 03:20)    Hgb: 11.2 (08-05 @ 03:20)  Hct: 33.5 (08-05 @ 03:20)  WBC: 11.75 (08-05 @ 03:20)  Plt: 157 (08-05 @ 03:20)    INR: 1.44 08-05-22 @ 03:20  PTT: 29.2 08-05-22 @ 03:20    MEDICATIONS:  chlorhexidine 0.12% Liquid 15 milliLiter(s) Oral Mucosa every 12 hours  chlorhexidine 2% Cloths 1 Application(s) Topical daily  dextrose 5%. 1000 milliLiter(s) IV Continuous <Continuous>  dextrose 5%. 1000 milliLiter(s) IV Continuous <Continuous>  dextrose 50% Injectable 25 Gram(s) IV Push once  dextrose 50% Injectable 12.5 Gram(s) IV Push once  dextrose 50% Injectable 25 Gram(s) IV Push once  dextrose Oral Gel 15 Gram(s) Oral once PRN  glucagon  Injectable 1 milliGRAM(s) IntraMuscular once  insulin lispro (ADMELOG) corrective regimen sliding scale   SubCutaneous every 6 hours  niCARdipine Infusion 5 mG/Hr IV Continuous <Continuous>  potassium chloride  10 mEq/100 mL IVPB 10 milliEquivalent(s) IV Intermittent every 1 hour  propofol Infusion 10 MICROgram(s)/kG/Min IV Continuous <Continuous>  sodium chloride 0.9%. 1000 milliLiter(s) IV Continuous <Continuous>    EXAMINATION:  General:  in NAD  HEENT:  MMM  Neuro:  awake, alert, oriented x 3, follows commands, EOMI, face symmetric, no PD, DF 5/5   Cards:  RRR  Respiratory:  no respiratory distress  Abdomen:  soft  Extremities:  no LE edema    Assessment/Plan:   ESUS vs cancer related hypercoagulability     Neuro:   - q1 neuro checks   - post thrombectomy care / post tpa care   - ct 24 hours post tpa or stat if change in exam   - MRI non con   - ASA tonight if CTH neg for hemorrhagic transformation start atorvastatin 20 mg daily  - MRI brain, MRA neck (poor eval of corotids on CTA)  - TTE done, f/u   - EKG    Cards:  - SBP 1001-50  - Echo / EKG   - Lipid panel   - metoprolol 25 mg BID   - amlodipine 2.5 mg daily hold   - confirm home meds     Resp:   - Vent, CPAP   - Chest x-ray   - ABG     GI:  - NPO, NG tube   - Oral access if remains intubated   - Dysphagia if extubated     :   - voiding   - NS at 75/hr     ID:   - No active issues reported will need cbc     Heme:  - SCDs  - ASA / Lovenox if 24 hr scan stable and no concern for large stroke / hemorrhagic conversion     Endo:  - A1C     Dispo: NSICU    HPI:   89 yo woman with HTN, HLD and breast cancer in remission, mRS 0 at baseline, admitted 8/5 with R sided weakness and aphasia, NIHSS 23, s/p tPA. Initial CTA with a L supraclinoid carotid occlusion, with angiogram showing a L M1 occlusion s/p TICI 3 recanalization. Admitted to NSCU intubated 2/2 low TV.     VITALS:  T(C): , Max: 38.1 (08-05-22 @ 08:11)  HR:  (57 - 77)  BP:  (106/57 - 152/80)  ABP: --  RR:  (10 - 20)  SpO2:  (100% - 100%)  Wt(kg): --  Device: Avea, Mode: AC/ CMV (Assist Control/ Continuous Mandatory Ventilation), RR (machine): 10, TV (machine): 450, FiO2: 30, PEEP: 5, MAP: 9, PIP: 26    08-04-22 @ 07:01  -  08-05-22 @ 07:00  --------------------------------------------------------  IN: 1362 mL / OUT: 650 mL / NET: 712 mL    08-05-22 @ 07:01  -  08-05-22 @ 10:17  --------------------------------------------------------  IN: 326.6 mL / OUT: 0 mL / NET: 326.6 mL      LABS:  Na: 138 (08-05 @ 03:20)  K: 3.5 (08-05 @ 03:20)  Cl: 101 (08-05 @ 03:20)  CO2: 24.0 (08-05 @ 03:20)  BUN: 11.8 (08-05 @ 03:20)  Cr: 0.64 (08-05 @ 03:20)  Glu: 147(08-05 @ 03:20)    Hgb: 11.2 (08-05 @ 03:20)  Hct: 33.5 (08-05 @ 03:20)  WBC: 11.75 (08-05 @ 03:20)  Plt: 157 (08-05 @ 03:20)    INR: 1.44 08-05-22 @ 03:20  PTT: 29.2 08-05-22 @ 03:20    MEDICATIONS:  chlorhexidine 0.12% Liquid 15 milliLiter(s) Oral Mucosa every 12 hours  chlorhexidine 2% Cloths 1 Application(s) Topical daily  dextrose 5%. 1000 milliLiter(s) IV Continuous <Continuous>  dextrose 5%. 1000 milliLiter(s) IV Continuous <Continuous>  dextrose 50% Injectable 25 Gram(s) IV Push once  dextrose 50% Injectable 12.5 Gram(s) IV Push once  dextrose 50% Injectable 25 Gram(s) IV Push once  dextrose Oral Gel 15 Gram(s) Oral once PRN  glucagon  Injectable 1 milliGRAM(s) IntraMuscular once  insulin lispro (ADMELOG) corrective regimen sliding scale   SubCutaneous every 6 hours  niCARdipine Infusion 5 mG/Hr IV Continuous <Continuous>  potassium chloride  10 mEq/100 mL IVPB 10 milliEquivalent(s) IV Intermittent every 1 hour  propofol Infusion 10 MICROgram(s)/kG/Min IV Continuous <Continuous>  sodium chloride 0.9%. 1000 milliLiter(s) IV Continuous <Continuous>    EXAMINATION:  General:  in NAD  HEENT:  intubated  Neuro:  opens eyes to voice, attends, does not follows commands, with L gaze preference, L side AG, R arm 1/5 to noxious, R leg 2/5 flexion to noxious   Cards:  RRR  Respiratory:  no respiratory distress  Abdomen:  soft  Extremities:  no LE edema    Assessment/Plan:   89 yo woman with HTN, HLD and breast cancer in remission, mRS 0 at baseline, admitted 8/5 with R sided weakness and aphasia, NIHSS 23, s/p tPA. Initial CTA with a L supraclinoid carotid occlusion, with angiogram showing a L M1 occlusion s/p TICI 3 recanalization. Mechanism of stroke is ESUS (likely Afib in patient's age group) vs less likely cancer related hypercoagulability   LDL 72, HgA1C 5.7.    Neuro:   - q1 neuro checks   - post thrombectomy care / post tpa care   - ct 24 hours post tpa or stat if change in exam   - MRI non con brain, MRA neck (poor eval of carotids on CTA)  - ASA tonight if CTH neg for hemorrhagic transformation, start atorvastatin 20 mg daily  - TTE done, f/u   - EKG    Cards:  - -150  - metoprolol 25 mg BID (home med)  - hold home amlodipine 2.5 mg daily  - confirm home meds     Resp: CXR with ET tube in appropriate position   - CPAP  - ABG     GI:  - NPO, NG tube   - Dysphagia if extubated     :   - voiding   - NS at 75/hr, bolus an extra 500 cc (dry on exam with small partially collapsible IVC on beside US)     ID:   - No active issues    Heme:  - SCDs  - Lov tonight if CTH with no hemorrhagic conversion     Endo: HgA1C 5.7  LAURA    Patient seen and examined by attending on 8/5/2022.    Patient is critically ill due to acute stroke s/p tPA and thromebctomy and at high risk for neurological deterioration or death due to: potential hemorrhagic conversion, currently requiring mechanical ventilation 2/2 brain injury, will attempt to extubate

## 2022-08-05 NOTE — OCCUPATIONAL THERAPY INITIAL EVALUATION ADULT - IMPAIRMENTS CONTRIBUTING IMPAIRED BED MOBILITY, REHAB EVAL
impaired balance/cognition/impaired coordination/decreased flexibility/abnormal muscle tone/impaired postural control/decreased ROM/impaired sensory feedback/decreased strength

## 2022-08-05 NOTE — PROGRESS NOTE ADULT - ASSESSMENT
Assessment:  88F who presented with R sided weakness and aphasia, found to have L MCA occlusion. Underwent mechanical thrombectomy, TICI 3 recanalization.  - POD1   - Exam somewhat improved from admission      Plan:   - Discussed with Dr. Jimenez   - Q2 hour neuro checks   - SBP goal 110-150 s/p thrombectomy  - 24-hour CTH performed, no hemorrhage, shows L sided strokes  - ASA 81 started  - TTE performed, results above  - MRI pending  - A1C 5.7%  - LDL 72, atorvastatin 20   - Fever w/u pending, CSF negative, c diff negative  - DVT prophylaxis; SCDs, lovenox 40   - MRI pending  - Further plan per NSICU team

## 2022-08-05 NOTE — PHYSICAL THERAPY INITIAL EVALUATION ADULT - GENERAL OBSERVATIONS, REHAB EVAL
Pt received supine in bed + telemetry//BP + IV + ETT @ 24cm pre/post PT + left wrist restraint, Pt non verbal at this time

## 2022-08-05 NOTE — OCCUPATIONAL THERAPY INITIAL EVALUATION ADULT - VISUAL ACUITY
Pt intermittently opens eyes but only briefly.  Pt attends to target at her midline and to pt's left side.  Pt did not attempt to look up, down or to her right at time of eval.  Ongoing visual assessment as pt becomes more alert.

## 2022-08-05 NOTE — OCCUPATIONAL THERAPY INITIAL EVALUATION ADULT - GENERAL OBSERVATIONS, REHAB EVAL
Pt received supine in bed, +bilateral VCDs, +right UE IV, +left UE IV, +vent, +primafit, +cardiac monitor with , +NGT, +left wrist restraint, agreeable to OT eval.

## 2022-08-05 NOTE — OCCUPATIONAL THERAPY INITIAL EVALUATION ADULT - RANGE OF MOTION EXAMINATION, UPPER EXTREMITY
Left shoulder active assisted ROM to 90, resistance at end range.  Left elbow to digits WFL actively assisted due to decreased following.  Right UE passive ROM shoulder: 0-90, elbow to digit WFL.

## 2022-08-05 NOTE — PHYSICAL THERAPY INITIAL EVALUATION ADULT - ADDITIONAL COMMENTS
Hx taken from pt's daughter Karrie at bedside. Pt lives in a private home with her daughter. No steps to navigate. Pt was independent PTA without an assist device. Pt owns RW, SAC and commode.

## 2022-08-05 NOTE — PROGRESS NOTE ADULT - SUBJECTIVE AND OBJECTIVE BOX
Patient is a 88y old  Female who presents with a chief complaint of Stroke (05 Aug 2022 10:16)    HPI:  88F last known well at 2:15pm, found around 330pm to have aphasia and right sided weakness. Presents to [facility] as code stroke found to have L MCA occlusion. Given TPA at 4:05pm and intubated for airway protection. Transferred to [facility] for mechanical thrombectomy, NIH 23, MRS 0 (04 Aug 2022 18:45)      Interval history:  Patient seen and examined by neuro IR team. Patient remains intubated, no longer on sedation with plan for extubation. 24 hour CTH performed, results below. No other acute events reported.       Vital Signs Last 24 Hrs  T(C): 38.1 (05 Aug 2022 14:11), Max: 38.1 (05 Aug 2022 08:11)  T(F): 100.6 (05 Aug 2022 14:11), Max: 100.6 (05 Aug 2022 08:11)  HR: 88 (05 Aug 2022 14:11) (57 - 105)  BP: 143/68 (05 Aug 2022 14:11) (106/57 - 152/80)  BP(mean): 91 (05 Aug 2022 14:11) (65 - 672)  RR: 19 (05 Aug 2022 14:11) (10 - 25)  SpO2: 100% (05 Aug 2022 14:11) (100% - 100%)    Parameters below as of 05 Aug 2022 14:11  Patient On (Oxygen Delivery Method): ventilator    O2 Concentration (%): 30      Physical Exam:  Constitutional: laying in bed, intubated   Neuro  * Mental Status: EO to voice, intubated, not following commands but moves extremities spontaneously   * Cranial Nerves: PERRL. L gaze preference unable to overcome. + cough.   * Motor: LUE antigravity, spontaneous and purposeful. LLE spontaneous but not antigravity. RUE/RLE withdrawal.   * Sensory: sensation grossly intact to noxious stimuli   * Reflexes: not assessed       LABS:                        11.2   11.75 )-----------( 157      ( 05 Aug 2022 03:20 )             33.5     08-05    138  |  101  |  11.8  ----------------------------<  147<H>  3.5   |  24.0  |  0.64    Ca    8.3<L>      05 Aug 2022 03:20  Phos  3.3     08-05  Mg     1.7     08-05      PT/INR - ( 05 Aug 2022 03:20 )   PT: 16.8 sec;   INR: 1.44 ratio    PTT - ( 05 Aug 2022 03:20 )  PTT:29.2 sec      Medications:  MEDICATIONS  (STANDING):  aspirin  chewable 81 milliGRAM(s) Oral daily  atorvastatin 20 milliGRAM(s) Oral at bedtime  chlorhexidine 0.12% Liquid 15 milliLiter(s) Oral Mucosa every 12 hours  chlorhexidine 2% Cloths 1 Application(s) Topical daily  enoxaparin Injectable 40 milliGRAM(s) SubCutaneous every 24 hours  fentaNYL    Injectable 25 MICROGram(s) IV Push once  metoprolol tartrate 25 milliGRAM(s) Oral two times a day  pantoprazole  Injectable 40 milliGRAM(s) IV Push daily  propofol Infusion 10 MICROgram(s)/kG/Min (3.22 mL/Hr) IV Continuous <Continuous>  sodium chloride 0.9% Bolus 500 milliLiter(s) IV Bolus once  sodium chloride 0.9%. 1000 milliLiter(s) (75 mL/Hr) IV Continuous <Continuous>    MEDICATIONS  (PRN):      RADIOLOGY & ADDITIONAL STUDIES:  < from: CT Head No Cont (08.05.22 @ 14:36) >  IMPRESSION: Acute left ADRYAN, left MCA, & right PCA territory infarctions.   No evidence of hemorrhagic transformation.    --- End of Report ---    GITA TAMAYO MD; Attending Radiologist  This document hasbeen electronically signed. Aug  5 2022  2:56PM    < end of copied text >      8/4 TTE  Summary:   1. Technically difficult study.   2. Hyperdynamic global left ventricular systolic function.   3. Left ventricular ejection fraction, by visual estimation, is 70 to   75%.   4. Spectral Doppler shows pseudonormal pattern of left ventricular   myocardial filling (Grade II diastolic dysfunction).   5. Normal right ventricular size and function.   6. The left atrium is normal in size.   7. Mild to moderate aortic valve stenosis.   8. Moderate aortic regurgitation.   9. Mild thickening of the anterior and posterior mitral valve leaflets.  10. Mild mitral annular calcification.  11. Mild to moderate mitral valve regurgitation.  12. Moderate tricuspid regurgitation.  13. Estimated pulmonary artery systolic pressure is 43.4 mmHg assuming a   right atrial pressure of 3 mmHg, which is consistent with mild pulmonary   hypertension.  14. There is no evidence of pericardial effusion.    MD Nakia Electronically signed on 8/5/2022 at 1:51:31 PM            *** Final ***

## 2022-08-05 NOTE — PHYSICAL THERAPY INITIAL EVALUATION ADULT - CRITERIA FOR SKILLED THERAPEUTIC INTERVENTIONS
pending progress, pt will benefit from inpatient rehabilitation./impairments found/rehab potential/anticipated equipment needs at discharge/anticipated discharge recommendation

## 2022-08-05 NOTE — PHYSICAL THERAPY INITIAL EVALUATION ADULT - IMPAIRMENTS FOUND, PT EVAL
arousal, attention, and cognition/cognitive impairment/fine motor/gait, locomotion, and balance/muscle strength

## 2022-08-05 NOTE — CONSULT NOTE ADULT - SUBJECTIVE AND OBJECTIVE BOX
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 Per chart review, she was last known well arpund 2:15pm and was found around 330pm to have aphasia and right sided weakness. She was givenTPA at 4:05pm and intubated for airway protection. Transferred to [facility] for mechanical thrombectomy, NIH 23. She is s/p mechanical thrombectomy, resultant TICI 3. PM&R consulted for CVA. Upon evaluation, patient is intubated and sedated.     Imaging: Pending CT head, MRI Brain, Echo    REVIEW OF SYSTEMS  Unable to obtain due to current mental status as patient is intubated and sedated.    VITALS  T(C): 37.9 (08-05-22 @ 09:11), Max: 38.1 (08-05-22 @ 08:11)  HR: 73 (08-05-22 @ 09:11) (57 - 77)  BP: 124/51 (08-05-22 @ 09:11) (106/57 - 152/80)  RR: 14 (08-05-22 @ 09:11) (10 - 20)  SpO2: 100% (08-05-22 @ 09:11) (100% - 100%)      PAST MEDICAL & SURGICAL HISTORY  HTN (hypertension)  Hypercholesteremia  Breast cancer  No significant past surgical history    FUNCTIONAL HISTORY  Unable to obtain at this time    CURRENT FUNCTIONAL STATUS  Pending PT/OT evaluation    SOCIAL HISTORY - as per documentation/history    FAMILY HISTORY   No pertinent family history in first degree relatives      RECENT LABS - Reviewed  CBC Full  -  ( 05 Aug 2022 03:20 )  WBC Count : 11.75 K/uL  RBC Count : 3.66 M/uL  Hemoglobin : 11.2 g/dL  Hematocrit : 33.5 %  Platelet Count - Automated : 157 K/uL  Mean Cell Volume : 91.5 fl  Mean Cell Hemoglobin : 30.6 pg  Mean Cell Hemoglobin Concentration : 33.4 gm/dL  Auto Neutrophil # : x  Auto Lymphocyte # : x  Auto Monocyte # : x  Auto Eosinophil # : x  Auto Basophil # : x  Auto Neutrophil % : x  Auto Lymphocyte % : x  Auto Monocyte % : x  Auto Eosinophil % : x  Auto Basophil % : x    08-05    138  |  101  |  11.8  ----------------------------<  147<H>  3.5   |  24.0  |  0.64    Ca    8.3<L>      05 Aug 2022 03:20  Phos  3.3     08-05  Mg     1.7     08-05        ALLERGIES  No Known Allergies    MEDICATIONS  (STANDING):  chlorhexidine 0.12% Liquid 15 milliLiter(s) Oral Mucosa every 12 hours  chlorhexidine 2% Cloths 1 Application(s) Topical daily  dextrose 5%. 1000 milliLiter(s) (50 mL/Hr) IV Continuous <Continuous>  dextrose 5%. 1000 milliLiter(s) (100 mL/Hr) IV Continuous <Continuous>  dextrose 50% Injectable 25 Gram(s) IV Push once  dextrose 50% Injectable 12.5 Gram(s) IV Push once  dextrose 50% Injectable 25 Gram(s) IV Push once  glucagon  Injectable 1 milliGRAM(s) IntraMuscular once  insulin lispro (ADMELOG) corrective regimen sliding scale   SubCutaneous every 6 hours  niCARdipine Infusion 5 mG/Hr (25 mL/Hr) IV Continuous <Continuous>  potassium chloride  10 mEq/100 mL IVPB 10 milliEquivalent(s) IV Intermittent every 1 hour  propofol Infusion 10 MICROgram(s)/kG/Min (3.22 mL/Hr) IV Continuous <Continuous>  sodium chloride 0.9%. 1000 milliLiter(s) (75 mL/Hr) IV Continuous <Continuous>    MEDICATIONS  (PRN):  dextrose Oral Gel 15 Gram(s) Oral once PRN Blood Glucose LESS THAN 70 milliGRAM(s)/deciliter    ----------------------------------------------------------------------------------------  PHYSICAL EXAM  Constitutional - NAD, intubated and sedated   Cardiovascular - S1S2   Abdomen - Soft   Extremities - No LE edema  Neurologic Exam - unable to assess due to mental status, spontaneous upper and lower extremity movement                  ----------------------------------------  ASSESSMENT/PLAN  88y Female with functional deficits after CVA s/p mechanical thrombectomy, pending CT head, MRI brain, Echo.   S/p Thrombectomy-Propofol   HTN- nicardipine  Diet- NPO  DVT ppx- SCD's   Rehab -Patient is being medically optimized currently intubated and sedated. Will continue to follow for rehab recommendations. Recommend ongoing mobilization by staff to maintain cardiopulmonary function and prevention of secondary complications related to debility. Discussed with rehab team.  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 Per chart review, she was last known well arpund 2:15pm and was found around 330pm to have aphasia and right sided weakness. She was givenTPA at 4:05pm and intubated for airway protection. Transferred to [facility] for mechanical thrombectomy, NIH 23. She is s/p mechanical thrombectomy, resultant TICI 3. PM&R consulted for CVA. Upon evaluation, patient is intubated and sedated.     Imaging: Pending CT head, MRI Brain, Echo    REVIEW OF SYSTEMS  Unable to obtain due to current mental status as patient is intubated and sedated.    VITALS  T(C): 37.9 (08-05-22 @ 09:11), Max: 38.1 (08-05-22 @ 08:11)  HR: 73 (08-05-22 @ 09:11) (57 - 77)  BP: 124/51 (08-05-22 @ 09:11) (106/57 - 152/80)  RR: 14 (08-05-22 @ 09:11) (10 - 20)  SpO2: 100% (08-05-22 @ 09:11) (100% - 100%)      PAST MEDICAL & SURGICAL HISTORY  HTN (hypertension)  Hypercholesteremia  Breast cancer  No significant past surgical history    FUNCTIONAL HISTORY  Unable to obtain at this time    CURRENT FUNCTIONAL STATUS  Pending PT/OT evaluation    SOCIAL HISTORY - as per documentation/history    FAMILY HISTORY   No pertinent family history in first degree relatives      RECENT LABS - Reviewed  CBC Full  -  ( 05 Aug 2022 03:20 )  WBC Count : 11.75 K/uL  RBC Count : 3.66 M/uL  Hemoglobin : 11.2 g/dL  Hematocrit : 33.5 %  Platelet Count - Automated : 157 K/uL  Mean Cell Volume : 91.5 fl  Mean Cell Hemoglobin : 30.6 pg  Mean Cell Hemoglobin Concentration : 33.4 gm/dL  Auto Neutrophil # : x  Auto Lymphocyte # : x  Auto Monocyte # : x  Auto Eosinophil # : x  Auto Basophil # : x  Auto Neutrophil % : x  Auto Lymphocyte % : x  Auto Monocyte % : x  Auto Eosinophil % : x  Auto Basophil % : x    08-05    138  |  101  |  11.8  ----------------------------<  147<H>  3.5   |  24.0  |  0.64    Ca    8.3<L>      05 Aug 2022 03:20  Phos  3.3     08-05  Mg     1.7     08-05        ALLERGIES  No Known Allergies    MEDICATIONS  (STANDING):  chlorhexidine 0.12% Liquid 15 milliLiter(s) Oral Mucosa every 12 hours  chlorhexidine 2% Cloths 1 Application(s) Topical daily  dextrose 5%. 1000 milliLiter(s) (50 mL/Hr) IV Continuous <Continuous>  dextrose 5%. 1000 milliLiter(s) (100 mL/Hr) IV Continuous <Continuous>  dextrose 50% Injectable 25 Gram(s) IV Push once  dextrose 50% Injectable 12.5 Gram(s) IV Push once  dextrose 50% Injectable 25 Gram(s) IV Push once  glucagon  Injectable 1 milliGRAM(s) IntraMuscular once  insulin lispro (ADMELOG) corrective regimen sliding scale   SubCutaneous every 6 hours  niCARdipine Infusion 5 mG/Hr (25 mL/Hr) IV Continuous <Continuous>  potassium chloride  10 mEq/100 mL IVPB 10 milliEquivalent(s) IV Intermittent every 1 hour  propofol Infusion 10 MICROgram(s)/kG/Min (3.22 mL/Hr) IV Continuous <Continuous>  sodium chloride 0.9%. 1000 milliLiter(s) (75 mL/Hr) IV Continuous <Continuous>    MEDICATIONS  (PRN):  dextrose Oral Gel 15 Gram(s) Oral once PRN Blood Glucose LESS THAN 70 milliGRAM(s)/deciliter    ----------------------------------------------------------------------------------------  PHYSICAL EXAM  Constitutional - NAD, intubated and sedated   Cardiovascular - S1S2   Abdomen - Soft   Extremities - No LE edema  Neurologic Exam - unable to assess due to mental status, spontaneous upper and lower extremity movement                  ----------------------------------------  ASSESSMENT/PLAN  88y Female with functional deficits after CVA s/p mechanical thrombectomy, pending CT head, MRI brain, Echo.   S/p Thrombectomy-Propofol   HTN- nicardipine  Diet- NPO  DVT ppx- SCD's   Rehab- Patient is currently intubated and sedated, being medically optimized. Will continue to follow for rehab recommendations. Recommend ongoing mobilization by staff to maintain cardiopulmonary function and prevention of secondary complications related to debility. Discussed with rehab team.  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 Per chart review, she was last known well around 2:15pm and was found around 330pm to have aphasia and right sided weakness. She was givenTPA at 4:05pm and intubated for airway protection. Transferred to [facility] for mechanical thrombectomy, NIH 23. She is s/p mechanical thrombectomy, resultant TICI 3. PM&R consulted for CVA. Upon evaluation, patient is intubated and sedated.     Imaging: Pending CT head, MRI Brain, Echo    REVIEW OF SYSTEMS  Unable to obtain due to current mental status as patient is intubated and sedated.    VITALS  T(C): 37.9 (08-05-22 @ 09:11), Max: 38.1 (08-05-22 @ 08:11)  HR: 73 (08-05-22 @ 09:11) (57 - 77)  BP: 124/51 (08-05-22 @ 09:11) (106/57 - 152/80)  RR: 14 (08-05-22 @ 09:11) (10 - 20)  SpO2: 100% (08-05-22 @ 09:11) (100% - 100%)      PAST MEDICAL & SURGICAL HISTORY  HTN (hypertension)  Hypercholesteremia  Breast cancer  No significant past surgical history    FUNCTIONAL HISTORY  Unable to obtain at this time    CURRENT FUNCTIONAL STATUS  Pending PT/OT evaluation    SOCIAL HISTORY - as per documentation/history    FAMILY HISTORY   No pertinent family history in first degree relatives      RECENT LABS - Reviewed  CBC Full  -  ( 05 Aug 2022 03:20 )  WBC Count : 11.75 K/uL  RBC Count : 3.66 M/uL  Hemoglobin : 11.2 g/dL  Hematocrit : 33.5 %  Platelet Count - Automated : 157 K/uL  Mean Cell Volume : 91.5 fl  Mean Cell Hemoglobin : 30.6 pg  Mean Cell Hemoglobin Concentration : 33.4 gm/dL  Auto Neutrophil # : x  Auto Lymphocyte # : x  Auto Monocyte # : x  Auto Eosinophil # : x  Auto Basophil # : x  Auto Neutrophil % : x  Auto Lymphocyte % : x  Auto Monocyte % : x  Auto Eosinophil % : x  Auto Basophil % : x    08-05    138  |  101  |  11.8  ----------------------------<  147<H>  3.5   |  24.0  |  0.64    Ca    8.3<L>      05 Aug 2022 03:20  Phos  3.3     08-05  Mg     1.7     08-05        ALLERGIES  No Known Allergies    MEDICATIONS  (STANDING):  chlorhexidine 0.12% Liquid 15 milliLiter(s) Oral Mucosa every 12 hours  chlorhexidine 2% Cloths 1 Application(s) Topical daily  dextrose 5%. 1000 milliLiter(s) (50 mL/Hr) IV Continuous <Continuous>  dextrose 5%. 1000 milliLiter(s) (100 mL/Hr) IV Continuous <Continuous>  dextrose 50% Injectable 25 Gram(s) IV Push once  dextrose 50% Injectable 12.5 Gram(s) IV Push once  dextrose 50% Injectable 25 Gram(s) IV Push once  glucagon  Injectable 1 milliGRAM(s) IntraMuscular once  insulin lispro (ADMELOG) corrective regimen sliding scale   SubCutaneous every 6 hours  niCARdipine Infusion 5 mG/Hr (25 mL/Hr) IV Continuous <Continuous>  potassium chloride  10 mEq/100 mL IVPB 10 milliEquivalent(s) IV Intermittent every 1 hour  propofol Infusion 10 MICROgram(s)/kG/Min (3.22 mL/Hr) IV Continuous <Continuous>  sodium chloride 0.9%. 1000 milliLiter(s) (75 mL/Hr) IV Continuous <Continuous>    MEDICATIONS  (PRN):  dextrose Oral Gel 15 Gram(s) Oral once PRN Blood Glucose LESS THAN 70 milliGRAM(s)/deciliter    ----------------------------------------------------------------------------------------  PHYSICAL EXAM  Constitutional - NAD, intubated and sedated   Cardiovascular - S1S2   Abdomen - Soft   Extremities - No LE edema  Neurologic Exam - unable to assess due to mental status, spontaneous upper and lower extremity movement                  ----------------------------------------  ASSESSMENT/PLAN  88y Female with functional deficits after CVA s/p mechanical thrombectomy, pending CT head, MRI brain, Echo.   S/p Thrombectomy-Propofol   HTN- nicardipine  Diet- NPO  DVT ppx- SCD's   Rehab- Patient is currently intubated and sedated, being medically optimized. Will continue to follow for rehab recommendations. Recommend ongoing mobilization by staff to maintain cardiopulmonary function and prevention of secondary complications related to debility. Discussed with rehab team.  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 Per chart review, she was last known well around 2:15pm and was found around 330pm to have aphasia and right sided weakness. She was givenTPA at 4:05pm and intubated for airway protection. Transferred to [facility] for mechanical thrombectomy, NIH 23. She is s/p mechanical thrombectomy, resultant TICI 3. PM&R consulted for CVA. Upon evaluation, patient is intubated and sedated.     Imaging: Pending CT head, MRI Brain, Echo    REVIEW OF SYSTEMS  Unable to obtain due to current mental status as patient is intubated and sedated.    VITALS  T(C): 37.9 (08-05-22 @ 09:11), Max: 38.1 (08-05-22 @ 08:11)  HR: 73 (08-05-22 @ 09:11) (57 - 77)  BP: 124/51 (08-05-22 @ 09:11) (106/57 - 152/80)  RR: 14 (08-05-22 @ 09:11) (10 - 20)  SpO2: 100% (08-05-22 @ 09:11) (100% - 100%)      PAST MEDICAL & SURGICAL HISTORY  HTN (hypertension)  Hypercholesteremia  Breast cancer  No significant past surgical history    FUNCTIONAL HISTORY  Unable to obtain at this time    CURRENT FUNCTIONAL STATUS  Pending PT/OT evaluation    SOCIAL HISTORY - as per documentation/history    FAMILY HISTORY   No pertinent family history in first degree relatives      RECENT LABS - Reviewed  CBC Full  -  ( 05 Aug 2022 03:20 )  WBC Count : 11.75 K/uL  RBC Count : 3.66 M/uL  Hemoglobin : 11.2 g/dL  Hematocrit : 33.5 %  Platelet Count - Automated : 157 K/uL  Mean Cell Volume : 91.5 fl  Mean Cell Hemoglobin : 30.6 pg  Mean Cell Hemoglobin Concentration : 33.4 gm/dL  Auto Neutrophil # : x  Auto Lymphocyte # : x  Auto Monocyte # : x  Auto Eosinophil # : x  Auto Basophil # : x  Auto Neutrophil % : x  Auto Lymphocyte % : x  Auto Monocyte % : x  Auto Eosinophil % : x  Auto Basophil % : x    08-05    138  |  101  |  11.8  ----------------------------<  147<H>  3.5   |  24.0  |  0.64    Ca    8.3<L>      05 Aug 2022 03:20  Phos  3.3     08-05  Mg     1.7     08-05        ALLERGIES  No Known Allergies    MEDICATIONS  (STANDING):  chlorhexidine 0.12% Liquid 15 milliLiter(s) Oral Mucosa every 12 hours  chlorhexidine 2% Cloths 1 Application(s) Topical daily  dextrose 5%. 1000 milliLiter(s) (50 mL/Hr) IV Continuous <Continuous>  dextrose 5%. 1000 milliLiter(s) (100 mL/Hr) IV Continuous <Continuous>  dextrose 50% Injectable 25 Gram(s) IV Push once  dextrose 50% Injectable 12.5 Gram(s) IV Push once  dextrose 50% Injectable 25 Gram(s) IV Push once  glucagon  Injectable 1 milliGRAM(s) IntraMuscular once  insulin lispro (ADMELOG) corrective regimen sliding scale   SubCutaneous every 6 hours  niCARdipine Infusion 5 mG/Hr (25 mL/Hr) IV Continuous <Continuous>  potassium chloride  10 mEq/100 mL IVPB 10 milliEquivalent(s) IV Intermittent every 1 hour  propofol Infusion 10 MICROgram(s)/kG/Min (3.22 mL/Hr) IV Continuous <Continuous>  sodium chloride 0.9%. 1000 milliLiter(s) (75 mL/Hr) IV Continuous <Continuous>    MEDICATIONS  (PRN):  dextrose Oral Gel 15 Gram(s) Oral once PRN Blood Glucose LESS THAN 70 milliGRAM(s)/deciliter    ----------------------------------------------------------------------------------------  PHYSICAL EXAM  Constitutional - NAD, intubated and sedated   Cardiovascular - No cyanosis  Respiratory - Intubated   Abdomen - Soft   Extremities - No LE edema  Neurologic Exam - unable to fully assess due to sedation, spontaneous upper and lower extremity movement    Reflexes -  No clonus   Skin - no ecchymosis                 ----------------------------------------  ASSESSMENT/PLAN  88y Female with functional deficits after CVA s/p mechanical thrombectomy, pending CT head, MRI brain, Echo.   CVA - S/p Thrombectomy  HTN- nicardipine  Diet- NPO, SLP evaluation once extuabted   DVT ppx- SCD's   Rehab- Consult PT/OT and SLP once medically appropriate.  Patient being medically optimized.  Continue with passive ROM and frequent turns/pressure relief.  Rehabilitation team will continue to follow for rehab recommendations. Recommend ongoing mobilization by staff to maintain cardiopulmonary function and prevention of secondary complications related to debility.

## 2022-08-05 NOTE — OCCUPATIONAL THERAPY INITIAL EVALUATION ADULT - ADDITIONAL COMMENTS
Pt very lethargic and intubated at time of eval.  PLOF received from family, handoff from PT Valery.   There are no steps to enter, no steps inside.  Pt has RW, cane and commode but didn't need to use prior to admission.

## 2022-08-05 NOTE — PHYSICAL THERAPY INITIAL EVALUATION ADULT - PERTINENT HX OF CURRENT PROBLEM, REHAB EVAL
89 yo woman with HTN, HLD and breast cancer in remission, mRS 0 at baseline, admitted 8/5 with R sided weakness and aphasia, NIHSS 23, s/p tPA. Initial CTA with a L supraclinoid carotid occlusion, with angiogram showing a L M1 occlusion now s/p mechanical thrombectomy TICI 3 recanalization. Intubated 2*2 low Tidal volume.

## 2022-08-05 NOTE — PHYSICAL THERAPY INITIAL EVALUATION ADULT - NEUROVASCULAR ASSESSMENT RUE
Quality 110: Preventive Care And Screening: Influenza Immunization: Influenza Immunization not Administered for Documented Reasons.
no withdrawal to any stimulation
Detail Level: Detailed
Quality 431: Preventive Care And Screening: Unhealthy Alcohol Use - Screening: Patient screened for unhealthy alcohol use using a single question and scores less than 2 times per year
Quality 226: Preventive Care And Screening: Tobacco Use: Screening And Cessation Intervention: Patient screened for tobacco use and is an ex/non-smoker

## 2022-08-06 LAB
ALBUMIN SERPL ELPH-MCNC: 3.4 G/DL — SIGNIFICANT CHANGE UP (ref 3.3–5.2)
ALP SERPL-CCNC: 45 U/L — SIGNIFICANT CHANGE UP (ref 40–120)
ALT FLD-CCNC: 10 U/L — SIGNIFICANT CHANGE UP
ANION GAP SERPL CALC-SCNC: 10 MMOL/L — SIGNIFICANT CHANGE UP (ref 5–17)
APPEARANCE UR: CLEAR — SIGNIFICANT CHANGE UP
AST SERPL-CCNC: 21 U/L — SIGNIFICANT CHANGE UP
BILIRUB DIRECT SERPL-MCNC: 0.3 MG/DL — SIGNIFICANT CHANGE UP (ref 0–0.3)
BILIRUB INDIRECT FLD-MCNC: 1.2 MG/DL — HIGH (ref 0.2–1)
BILIRUB SERPL-MCNC: 1.6 MG/DL — SIGNIFICANT CHANGE UP (ref 0.4–2)
BILIRUB UR-MCNC: NEGATIVE — SIGNIFICANT CHANGE UP
BUN SERPL-MCNC: 7.1 MG/DL — LOW (ref 8–20)
CALCIUM SERPL-MCNC: 8.2 MG/DL — LOW (ref 8.4–10.5)
CHLORIDE SERPL-SCNC: 101 MMOL/L — SIGNIFICANT CHANGE UP (ref 98–107)
CO2 SERPL-SCNC: 24 MMOL/L — SIGNIFICANT CHANGE UP (ref 22–29)
COLOR SPEC: YELLOW — SIGNIFICANT CHANGE UP
CREAT SERPL-MCNC: 0.49 MG/DL — LOW (ref 0.5–1.3)
D DIMER BLD IA.RAPID-MCNC: 784 NG/ML DDU — HIGH
DIFF PNL FLD: ABNORMAL
EGFR: 91 ML/MIN/1.73M2 — SIGNIFICANT CHANGE UP
EPI CELLS # UR: SIGNIFICANT CHANGE UP
GLUCOSE SERPL-MCNC: 115 MG/DL — HIGH (ref 70–99)
GLUCOSE UR QL: NEGATIVE MG/DL — SIGNIFICANT CHANGE UP
HCT VFR BLD CALC: 32.4 % — LOW (ref 34.5–45)
HGB BLD-MCNC: 10.9 G/DL — LOW (ref 11.5–15.5)
KETONES UR-MCNC: NEGATIVE — SIGNIFICANT CHANGE UP
LEUKOCYTE ESTERASE UR-ACNC: NEGATIVE — SIGNIFICANT CHANGE UP
MAGNESIUM SERPL-MCNC: 1.6 MG/DL — SIGNIFICANT CHANGE UP (ref 1.6–2.6)
MCHC RBC-ENTMCNC: 30.5 PG — SIGNIFICANT CHANGE UP (ref 27–34)
MCHC RBC-ENTMCNC: 33.6 GM/DL — SIGNIFICANT CHANGE UP (ref 32–36)
MCV RBC AUTO: 90.8 FL — SIGNIFICANT CHANGE UP (ref 80–100)
NITRITE UR-MCNC: NEGATIVE — SIGNIFICANT CHANGE UP
PH UR: 6 — SIGNIFICANT CHANGE UP (ref 5–8)
PHOSPHATE SERPL-MCNC: 2.4 MG/DL — SIGNIFICANT CHANGE UP (ref 2.4–4.7)
PLATELET # BLD AUTO: 123 K/UL — LOW (ref 150–400)
POTASSIUM SERPL-MCNC: 3.3 MMOL/L — LOW (ref 3.5–5.3)
POTASSIUM SERPL-SCNC: 3.3 MMOL/L — LOW (ref 3.5–5.3)
PROCALCITONIN SERPL-MCNC: 0.1 NG/ML — SIGNIFICANT CHANGE UP (ref 0.02–0.1)
PROT SERPL-MCNC: 5.8 G/DL — LOW (ref 6.6–8.7)
PROT UR-MCNC: NEGATIVE — SIGNIFICANT CHANGE UP
RBC # BLD: 3.57 M/UL — LOW (ref 3.8–5.2)
RBC # FLD: 13.5 % — SIGNIFICANT CHANGE UP (ref 10.3–14.5)
RBC CASTS # UR COMP ASSIST: ABNORMAL /HPF (ref 0–4)
SODIUM SERPL-SCNC: 135 MMOL/L — SIGNIFICANT CHANGE UP (ref 135–145)
SP GR SPEC: 1.01 — SIGNIFICANT CHANGE UP (ref 1.01–1.02)
UROBILINOGEN FLD QL: NEGATIVE MG/DL — SIGNIFICANT CHANGE UP
WBC # BLD: 10.24 K/UL — SIGNIFICANT CHANGE UP (ref 3.8–10.5)
WBC # FLD AUTO: 10.24 K/UL — SIGNIFICANT CHANGE UP (ref 3.8–10.5)
WBC UR QL: NEGATIVE /HPF — SIGNIFICANT CHANGE UP (ref 0–5)

## 2022-08-06 PROCEDURE — 93970 EXTREMITY STUDY: CPT | Mod: 26

## 2022-08-06 PROCEDURE — 99291 CRITICAL CARE FIRST HOUR: CPT

## 2022-08-06 RX ORDER — ACETAMINOPHEN 500 MG
650 TABLET ORAL EVERY 6 HOURS
Refills: 0 | Status: DISCONTINUED | OUTPATIENT
Start: 2022-08-06 | End: 2022-08-11

## 2022-08-06 RX ORDER — SODIUM CHLORIDE 9 MG/ML
1000 INJECTION, SOLUTION INTRAVENOUS
Refills: 0 | Status: DISCONTINUED | OUTPATIENT
Start: 2022-08-06 | End: 2022-08-06

## 2022-08-06 RX ORDER — SODIUM CHLORIDE 9 MG/ML
2 INJECTION INTRAMUSCULAR; INTRAVENOUS; SUBCUTANEOUS THREE TIMES A DAY
Refills: 0 | Status: DISCONTINUED | OUTPATIENT
Start: 2022-08-06 | End: 2022-08-11

## 2022-08-06 RX ORDER — POTASSIUM CHLORIDE 20 MEQ
10 PACKET (EA) ORAL
Refills: 0 | Status: COMPLETED | OUTPATIENT
Start: 2022-08-06 | End: 2022-08-06

## 2022-08-06 RX ORDER — POTASSIUM CHLORIDE 20 MEQ
40 PACKET (EA) ORAL EVERY 4 HOURS
Refills: 0 | Status: DISCONTINUED | OUTPATIENT
Start: 2022-08-06 | End: 2022-08-06

## 2022-08-06 RX ORDER — LANOLIN ALCOHOL/MO/W.PET/CERES
3 CREAM (GRAM) TOPICAL AT BEDTIME
Refills: 0 | Status: DISCONTINUED | OUTPATIENT
Start: 2022-08-06 | End: 2022-08-11

## 2022-08-06 RX ORDER — IPRATROPIUM/ALBUTEROL SULFATE 18-103MCG
3 AEROSOL WITH ADAPTER (GRAM) INHALATION EVERY 6 HOURS
Refills: 0 | Status: DISCONTINUED | OUTPATIENT
Start: 2022-08-06 | End: 2022-08-08

## 2022-08-06 RX ORDER — ALBUTEROL 90 UG/1
2 AEROSOL, METERED ORAL EVERY 6 HOURS
Refills: 0 | Status: DISCONTINUED | OUTPATIENT
Start: 2022-08-06 | End: 2022-08-11

## 2022-08-06 RX ORDER — POTASSIUM CHLORIDE 20 MEQ
40 PACKET (EA) ORAL ONCE
Refills: 0 | Status: COMPLETED | OUTPATIENT
Start: 2022-08-06 | End: 2022-08-06

## 2022-08-06 RX ORDER — IPRATROPIUM/ALBUTEROL SULFATE 18-103MCG
3 AEROSOL WITH ADAPTER (GRAM) INHALATION EVERY 6 HOURS
Refills: 0 | Status: DISCONTINUED | OUTPATIENT
Start: 2022-08-06 | End: 2022-08-06

## 2022-08-06 RX ORDER — FUROSEMIDE 40 MG
20 TABLET ORAL ONCE
Refills: 0 | Status: COMPLETED | OUTPATIENT
Start: 2022-08-06 | End: 2022-08-06

## 2022-08-06 RX ORDER — ACETAMINOPHEN 500 MG
1000 TABLET ORAL ONCE
Refills: 0 | Status: COMPLETED | OUTPATIENT
Start: 2022-08-06 | End: 2022-08-06

## 2022-08-06 RX ORDER — MAGNESIUM SULFATE 500 MG/ML
2 VIAL (ML) INJECTION ONCE
Refills: 0 | Status: COMPLETED | OUTPATIENT
Start: 2022-08-06 | End: 2022-08-06

## 2022-08-06 RX ADMIN — Medication 650 MILLIGRAM(S): at 19:22

## 2022-08-06 RX ADMIN — Medication 100 MILLIEQUIVALENT(S): at 03:25

## 2022-08-06 RX ADMIN — SODIUM CHLORIDE 2 GRAM(S): 9 INJECTION INTRAMUSCULAR; INTRAVENOUS; SUBCUTANEOUS at 21:35

## 2022-08-06 RX ADMIN — Medication 20 MILLIGRAM(S): at 09:35

## 2022-08-06 RX ADMIN — SODIUM CHLORIDE 75 MILLILITER(S): 9 INJECTION, SOLUTION INTRAVENOUS at 03:25

## 2022-08-06 RX ADMIN — Medication 3 MILLIGRAM(S): at 21:35

## 2022-08-06 RX ADMIN — SODIUM CHLORIDE 2 GRAM(S): 9 INJECTION INTRAMUSCULAR; INTRAVENOUS; SUBCUTANEOUS at 11:02

## 2022-08-06 RX ADMIN — Medication 650 MILLIGRAM(S): at 20:10

## 2022-08-06 RX ADMIN — Medication 400 MILLIGRAM(S): at 02:02

## 2022-08-06 RX ADMIN — Medication 1000 MILLIGRAM(S): at 03:31

## 2022-08-06 RX ADMIN — ATORVASTATIN CALCIUM 20 MILLIGRAM(S): 80 TABLET, FILM COATED ORAL at 21:35

## 2022-08-06 RX ADMIN — Medication 100 MILLIEQUIVALENT(S): at 04:30

## 2022-08-06 RX ADMIN — Medication 650 MILLIGRAM(S): at 14:17

## 2022-08-06 RX ADMIN — Medication 25 GRAM(S): at 03:25

## 2022-08-06 RX ADMIN — Medication 25 MILLIGRAM(S): at 17:20

## 2022-08-06 RX ADMIN — Medication 40 MILLIEQUIVALENT(S): at 11:34

## 2022-08-06 RX ADMIN — Medication 100 MILLIEQUIVALENT(S): at 05:37

## 2022-08-06 RX ADMIN — ENOXAPARIN SODIUM 40 MILLIGRAM(S): 100 INJECTION SUBCUTANEOUS at 17:19

## 2022-08-06 RX ADMIN — CHLORHEXIDINE GLUCONATE 1 APPLICATION(S): 213 SOLUTION TOPICAL at 11:01

## 2022-08-06 RX ADMIN — Medication 650 MILLIGRAM(S): at 12:29

## 2022-08-06 RX ADMIN — Medication 81 MILLIGRAM(S): at 11:03

## 2022-08-06 RX ADMIN — Medication 25 MILLIGRAM(S): at 05:38

## 2022-08-06 RX ADMIN — Medication 3 MILLILITER(S): at 11:53

## 2022-08-06 NOTE — DIETITIAN INITIAL EVALUATION ADULT - PERTINENT MEDS FT
MEDICATIONS  (STANDING):  aspirin  chewable 81 milliGRAM(s) Oral daily  atorvastatin 20 milliGRAM(s) Oral at bedtime  chlorhexidine 2% Cloths 1 Application(s) Topical daily  enoxaparin Injectable 40 milliGRAM(s) SubCutaneous every 24 hours  metoprolol tartrate 25 milliGRAM(s) Oral two times a day  potassium chloride   Powder 40 milliEquivalent(s) Enteral Tube once  sodium chloride 2 Gram(s) Oral three times a day    MEDICATIONS  (PRN):  acetaminophen    Suspension .. 650 milliGRAM(s) Oral every 6 hours PRN Temp greater or equal to 38C (100.4F), Mild Pain (1 - 3)  ALBUTerol    90 MICROgram(s) HFA Inhaler 2 Puff(s) Inhalation every 6 hours PRN Bronchospasm  albuterol/ipratropium for Nebulization 3 milliLiter(s) Nebulizer every 6 hours PRN Wheezing

## 2022-08-06 NOTE — DIETITIAN INITIAL EVALUATION ADULT - NS FNS DIET ORDER
Diet, NPO:      Special Instructions for Nursing:  For the first 12 hours post tissue plasminogen activator (tPA) bolus (08-04-22 @ 18:48)

## 2022-08-06 NOTE — PROGRESS NOTE ADULT - SUBJECTIVE AND OBJECTIVE BOX
HPI:   87 yo woman with HTN, HLD and breast cancer in remission, mRS 0 at baseline, admitted 8/5 with R sided weakness and aphasia, NIHSS 23, s/p tPA. Initial CTA with a L supraclinoid carotid occlusion, with angiogram showing a L M1 occlusion s/p TICI 3 recanalization. Admitted to NSCU intubated 2/2 low TV.     ICU Vital Signs Last 24 Hrs  T(C): 38 (06 Aug 2022 08:00), Max: 38.3 (05 Aug 2022 19:11)  T(F): 100.4 (06 Aug 2022 08:00), Max: 100.9 (05 Aug 2022 19:11)  HR: 73 (06 Aug 2022 08:00) (65 - 105)  BP: 116/53 (06 Aug 2022 08:00) (101/54 - 143/88)  BP(mean): 71 (06 Aug 2022 08:00) (60 - 106)  ABP: --  ABP(mean): --  RR: 27 (06 Aug 2022 08:00) (14 - 27)  SpO2: 92% (06 Aug 2022 08:00) (91% - 100%)    O2 Parameters below as of 06 Aug 2022 07:00  Patient On (Oxygen Delivery Method): room air        05 Aug 2022 07:01  -  06 Aug 2022 07:00  --------------------------------------------------------  IN:    IV PiggyBack: 200 mL    IV PiggyBack: 50 mL    IV PiggyBack: 400 mL    multiple electrolytes Injection Type 1.: 300 mL    Propofol: 1.6 mL    sodium chloride 0.9%: 1500 mL    Sodium Chloride 0.9% Bolus: 500 mL  Total IN: 2951.6 mL    OUT:    Voided (mL): 950 mL  Total OUT: 950 mL    Total NET: 2001.6 mL      MEDICATIONS  (STANDING):  aspirin  chewable 81 milliGRAM(s) Oral daily  atorvastatin 20 milliGRAM(s) Oral at bedtime  chlorhexidine 2% Cloths 1 Application(s) Topical daily  enoxaparin Injectable 40 milliGRAM(s) SubCutaneous every 24 hours  metoprolol tartrate 25 milliGRAM(s) Oral two times a day  multiple electrolytes Injection Type 1 1000 milliLiter(s) (75 mL/Hr) IV Continuous <Continuous>  pantoprazole  Injectable 40 milliGRAM(s) IV Push daily    MEDICATIONS  (PRN):        LABS:    08-06    135  |  101  |  7.1<L>  ----------------------------<  115<H>  3.3<L>   |  24.0  |  0.49<L>    Ca    8.2<L>      06 Aug 2022 02:18  Phos  2.4     08-06  Mg     1.6     08-06    TPro  5.8<L>  /  Alb  3.4  /  TBili  1.6  /  DBili  0.3  /  AST  21  /  ALT  10  /  AlkPhos  45  08-06    Na: 138 (08-05 @ 03:20)  K: 3.5 (08-05 @ 03:20)  Cl: 101 (08-05 @ 03:20)  CO2: 24.0 (08-05 @ 03:20)  BUN: 11.8 (08-05 @ 03:20)  Cr: 0.64 (08-05 @ 03:20)  Glu: 147(08-05 @ 03:20)                          10.9   10.24 )-----------( 123      ( 06 Aug 2022 02:18 )             32.4       Hgb: 11.2 (08-05 @ 03:20)  Hct: 33.5 (08-05 @ 03:20)  WBC: 11.75 (08-05 @ 03:20)  Plt: 157 (08-05 @ 03:20)    INR: 1.44 08-05-22 @ 03:20  PTT: 29.2 08-05-22 @ 03:20     CT Head No Cont (08.05.22 @ 14:36) >  INTERPRETATION:  CLINICAL INDICATIONS:  cva    COMPARISON: CT head dated 8/4/2022    TECHNIQUE: Noncontrast CT of the head. Multiplanar reformations are   submitted.    FINDINGS:  Evolving acute left MCA territory infarct involving the left frontal   lobe, left basal ganglia. Acute anterior left temporal lobe and acute   right occipital lobe infarction. Acute anterior inferior left frontal   lobe infarction. No evidence of hemorrhagic transformation. Consider MRI   for further evaluation.    Status post left ocular lens replacement. The orbits, mastoid air cells   and visualized paranasal sinuses are otherwise unremarkable. The   calvarium is intact.Consider MRI as clinically warranted.    IMPRESSION: Acute left ADRYAN, left MCA, & right PCA territory infarctions.   No evidence of hemorrhagic transformation.       US Duplex Venous Lower Ext Complete, Bilateral (11.18.17 @ 09:29) >  INTERPRETATION:  CLINICAL INFORMATION: Leg swelling    COMPARISON: None available.    TECHNIQUE: Duplex sonography of the BILATERAL LOWER extremities with   color and spectral Doppler, with and without compression.      FINDINGS:    There is normal compressibility of the bilateral common femoral, femoral   and popliteal veins. No calf vein thrombosis is detected.    Doppler examination shows normal spontaneous and phasic flow.    IMPRESSION:     No evidence of bilateral lower extremity deep venous thrombosis.            EXAMINATION:  General:  in NAD  HEENT:  intubated  Neuro:  opens eyes to voice, attends, does not follows commands, with L gaze preference, L side AG, R arm 1/5 to noxious, R leg 2/5 flexion to noxious   Cards:  RRR  Respiratory:  no respiratory distress  Abdomen:  soft  Extremities:  no LE edema         HPI:   89 yo woman with HTN, HLD and breast cancer in remission, mRS 0 at baseline, admitted 8/5 with R sided weakness and aphasia, NIHSS 23, s/p tPA. Initial CTA with a L supraclinoid carotid occlusion, with angiogram showing a L M1 occlusion s/p TICI 3 recanalization.   NSCU intubated 2/2 low TV.   Extubated 8/5/22    ICU Vital Signs Last 24 Hrs  T(C): 38 (06 Aug 2022 08:00), Max: 38.3 (05 Aug 2022 19:11)  T(F): 100.4 (06 Aug 2022 08:00), Max: 100.9 (05 Aug 2022 19:11)  HR: 73 (06 Aug 2022 08:00) (65 - 105)  BP: 116/53 (06 Aug 2022 08:00) (101/54 - 143/88)  BP(mean): 71 (06 Aug 2022 08:00) (60 - 106)  RR: 27 (06 Aug 2022 08:00) (14 - 27)  SpO2: 92% (06 Aug 2022 08:00) (91% - 100%)    O2 Parameters below as of 06 Aug 2022 07:00  Patient On (Oxygen Delivery Method): room air        05 Aug 2022 07:01  -  06 Aug 2022 07:00  --------------------------------------------------------  IN:    IV PiggyBack: 200 mL    IV PiggyBack: 50 mL    IV PiggyBack: 400 mL    multiple electrolytes Injection Type 1.: 300 mL    Propofol: 1.6 mL    sodium chloride 0.9%: 1500 mL    Sodium Chloride 0.9% Bolus: 500 mL  Total IN: 2951.6 mL    OUT:    Voided (mL): 950 mL  Total OUT: 950 mL    Total NET: 2001.6 mL      MEDICATIONS  (STANDING):  aspirin  chewable 81 milliGRAM(s) Oral daily  atorvastatin 20 milliGRAM(s) Oral at bedtime  chlorhexidine 2% Cloths 1 Application(s) Topical daily  enoxaparin Injectable 40 milliGRAM(s) SubCutaneous every 24 hours  metoprolol tartrate 25 milliGRAM(s) Oral two times a day  multiple electrolytes Injection Type 1 1000 milliLiter(s) (75 mL/Hr) IV Continuous <Continuous>  pantoprazole  Injectable 40 milliGRAM(s) IV Push daily    MEDICATIONS  (PRN):        LABS:    08-06    135  |  101  |  7.1<L>  ----------------------------<  115<H>  3.3<L>   |  24.0  |  0.49<L>    Ca    8.2<L>      06 Aug 2022 02:18  Phos  2.4     08-06  Mg     1.6     08-06    TPro  5.8<L>  /  Alb  3.4  /  TBili  1.6  /  DBili  0.3  /  AST  21  /  ALT  10  /  AlkPhos  45  08-06    Na: 138 (08-05 @ 03:20)  K: 3.5 (08-05 @ 03:20)  Cl: 101 (08-05 @ 03:20)  CO2: 24.0 (08-05 @ 03:20)  BUN: 11.8 (08-05 @ 03:20)  Cr: 0.64 (08-05 @ 03:20)  Glu: 147(08-05 @ 03:20)                          10.9   10.24 )-----------( 123      ( 06 Aug 2022 02:18 )             32.4       Hgb: 11.2 (08-05 @ 03:20)  Hct: 33.5 (08-05 @ 03:20)  WBC: 11.75 (08-05 @ 03:20)  Plt: 157 (08-05 @ 03:20)    INR: 1.44 08-05-22 @ 03:20  PTT: 29.2 08-05-22 @ 03:20     TTE Echo Complete w/ Contrast w/ Doppler (08.05.22 @ 09:45) >   1. Technically difficult study.   2. Hyperdynamic global left ventricular systolic function.   3. Left ventricular ejection fraction, by visual estimation, is 70 to   75%.   4. Spectral Doppler shows pseudonormal pattern of left ventricular   myocardial filling (Grade II diastolic dysfunction).   5. Normal right ventricular size and function.   6. The left atrium is normal in size.   7. Mild to moderate aortic valve stenosis.   8. Moderate aortic regurgitation.   9. Mild thickening of the anterior and posterior mitral valve leaflets.  10. Mild mitral annular calcification.  11. Mild to moderate mitral valve regurgitation.  12. Moderate tricuspid regurgitation.  13. Estimated pulmonary artery systolic pressure is 43.4 mmHg assuming a   right atrial pressureof 3 mmHg, which is consistent with mild pulmonary   hypertension.  14. There is no evidence of pericardial effusion.    < end of copied text >       CT Head No Cont (08.05.22 @ 14:36) >  INTERPRETATION:  CLINICAL INDICATIONS:  cva    COMPARISON: CT head dated 8/4/2022    TECHNIQUE: Noncontrast CT of the head. Multiplanar reformations are   submitted.    FINDINGS:  Evolving acute left MCA territory infarct involving the left frontal   lobe, left basal ganglia. Acute anterior left temporal lobe and acute   right occipital lobe infarction. Acute anterior inferior left frontal   lobe infarction. No evidence of hemorrhagic transformation. Consider MRI   for further evaluation.    Status post left ocular lens replacement. The orbits, mastoid air cells   and visualized paranasal sinuses are otherwise unremarkable. The   calvarium is intact.Consider MRI as clinically warranted.    IMPRESSION: Acute left ADRYAN, left MCA, & right PCA territory infarctions.   No evidence of hemorrhagic transformation.       US Duplex Venous Lower Ext Complete, Bilateral (11.18.17 @ 09:29) >  INTERPRETATION:  CLINICAL INFORMATION: Leg swelling    COMPARISON: None available.    TECHNIQUE: Duplex sonography of the BILATERAL LOWER extremities with   color and spectral Doppler, with and without compression.      FINDINGS:    There is normal compressibility of the bilateral common femoral, femoral   and popliteal veins. No calf vein thrombosis is detected.    Doppler examination shows normal spontaneous and phasic flow.    IMPRESSION:     No evidence of bilateral lower extremity deep venous thrombosis.        EXAMINATION:  General:  in NAD  HEENT:  intubated  Neuro:  opens eyes to voice, attends, does not follows commands, with L gaze preference, L side AG, R arm 1/5 to noxious, R leg 2/5 flexion to noxious   Cards:  RRR  Respiratory:  no respiratory distress  Abdomen:  soft  Extremities:  no LE edema

## 2022-08-06 NOTE — DIETITIAN INITIAL EVALUATION ADULT - OTHER INFO
88 year old female with HTN, HLD and breast cancer in remission, admitted 8/5 with R sided weakness and aphasia, s/p tPA. Initial CTA with a L supraclinoid carotid occlusion, with angiogram showing a L M1 occlusion s/p TICI 3 recanalization.     Pt lethargic at time of interview, extubated yesterday. Currently NPO, failed bedside dysphagia screen now pending formal SLP swallow evaluation. NGT in place. No family present at bedside at time of interview to obtain nutrition hx. RD to follow up.

## 2022-08-06 NOTE — DIETITIAN INITIAL EVALUATION ADULT - ETIOLOGY
related to motor causes due to L M1 occlusion s/p TICI 3 recanalization related to motor causes 2/2 L M1 occlusion s/p TICI 3 recanalization

## 2022-08-06 NOTE — PROGRESS NOTE ADULT - ASSESSMENT
Assessment/Plan:   87 yo woman with HTN, HLD and breast cancer in remission, mRS 0 at baseline, admitted 8/5 with R sided weakness and aphasia, NIHSS 23, s/p tPA. Initial CTA with a L supraclinoid carotid occlusion, with angiogram showing a L M1 occlusion s/p TICI 3 recanalization. Mechanism of stroke is ESUS (likely Afib in patient's age group) vs less likely cancer related hypercoagulability   LDL 72, HgA1C 5.7.    Neuro:   - q1 neuro checks   - post thrombectomy care / post tpa care   - ct 24 hours post tpa or stat if change in exam   - MRI non con brain, MRA neck (poor eval of carotids on CTA)  - ASA tonight if CTH neg for hemorrhagic transformation, start atorvastatin 20 mg daily  - TTE done, f/u   - EKG    Cards:  - -150  - metoprolol 25 mg BID (home med)  - hold home amlodipine 2.5 mg daily  - confirm home meds     Resp: CXR with ET tube in appropriate position   - CPAP  - ABG     GI:  - NPO, NG tube   - Dysphagia if extubated     :   - voiding   - NS at 75/hr, bolus an extra 500 cc (dry on exam with small partially collapsible IVC on beside US)     ID:   - No active issues    Heme:  - SCDs  - Lov tonight if CTH with no hemorrhagic conversion  Assessment/Plan:   89 yo woman with HTN, HLD and breast cancer in remission, mRS 0 at baseline, admitted 8/5 with R sided weakness and aphasia, NIHSS 23, s/p tPA. Initial CTA with a L supraclinoid carotid occlusion, with angiogram showing a L M1 occlusion s/p TICI 3 recanalization. Mechanism of stroke is ESUS (likely Afib in patient's age group) vs less likely cancer related hypercoagulability   LDL 72, HgA1C 5.7.    Neuro:   - q1 neuro checks   - post thrombectomy care / post tpa care   - CT - l parietal infarct - cytoxic edema   - Na - goal 140 < 145  - MRI non con brain, MRA neck (poor eval of carotids on CTA)  - ASA ; atorvastatin 20 mg daily   - EKG    Cards: HTN ; Stage 2 Diastolic Dysfunction  - Card consult for Loop recorder   - Maintain monitoring for arrythmia  - -< 150  - metoprolol 25 mg BID (home med)  - hold home amlodipine 2.5 mg daily  - confirmed  home meds     Resp:   extubated 8/5/22  - Mouth care and pul toilet   -Pul edema   -Lasix 20mg X1 IV  - Maintain O2 Sats > 92 %       GI:  - NPO for today monitor resp status   , NG tube   - Dysphagia fail  - stool softeners  - D/C protonix       :   - Bladder scan after F/U output with lasix       ID: Fever  - ? Microaspiration   UA- neg   Procal low   - No active issues  - No evidence of thrombophlebitis    Heme: Thrombocytopenia  - Will monitor   - Baseline    - SCDs  - lovenox - 40mg q day SQ   - Doppler B/L LE

## 2022-08-06 NOTE — DIETITIAN INITIAL EVALUATION ADULT - PERTINENT LABORATORY DATA
08-06    135  |  101  |  7.1<L>  ----------------------------<  115<H>  3.3<L>   |  24.0  |  0.49<L>    Ca    8.2<L>      06 Aug 2022 02:18  Phos  2.4     08-06  Mg     1.6     08-06    TPro  5.8<L>  /  Alb  3.4  /  TBili  1.6  /  DBili  0.3  /  AST  21  /  ALT  10  /  AlkPhos  45  08-06  A1C with Estimated Average Glucose Result: 5.7 % (08-05-22 @ 03:20)

## 2022-08-06 NOTE — DIETITIAN INITIAL EVALUATION ADULT - ENTERAL
If pt deemed unsafe for PO intake, suggest initiation of enteral nutrition via NGT - Jevity 1.5 @ 20 ml/hr and advance 10 ml/hr q4 hrs until goal rate of 50 ml/hr (x20 hrs) to provide 1000 ml, 1500 kcal, 64g protein, 760 ml free water, and 100% of RDIs for vitamins/minerals. Additional free water per MD discretion.

## 2022-08-07 LAB
ANION GAP SERPL CALC-SCNC: 9 MMOL/L — SIGNIFICANT CHANGE UP (ref 5–17)
BUN SERPL-MCNC: 11.9 MG/DL — SIGNIFICANT CHANGE UP (ref 8–20)
CALCIUM SERPL-MCNC: 8.5 MG/DL — SIGNIFICANT CHANGE UP (ref 8.4–10.5)
CHLORIDE SERPL-SCNC: 104 MMOL/L — SIGNIFICANT CHANGE UP (ref 98–107)
CO2 SERPL-SCNC: 23 MMOL/L — SIGNIFICANT CHANGE UP (ref 22–29)
CREAT SERPL-MCNC: 0.56 MG/DL — SIGNIFICANT CHANGE UP (ref 0.5–1.3)
EGFR: 88 ML/MIN/1.73M2 — SIGNIFICANT CHANGE UP
GLUCOSE SERPL-MCNC: 121 MG/DL — HIGH (ref 70–99)
GRAM STN FLD: SIGNIFICANT CHANGE UP
HCT VFR BLD CALC: 34.2 % — LOW (ref 34.5–45)
HGB BLD-MCNC: 11.5 G/DL — SIGNIFICANT CHANGE UP (ref 11.5–15.5)
MAGNESIUM SERPL-MCNC: 2 MG/DL — SIGNIFICANT CHANGE UP (ref 1.6–2.6)
MCHC RBC-ENTMCNC: 30.5 PG — SIGNIFICANT CHANGE UP (ref 27–34)
MCHC RBC-ENTMCNC: 33.6 GM/DL — SIGNIFICANT CHANGE UP (ref 32–36)
MCV RBC AUTO: 90.7 FL — SIGNIFICANT CHANGE UP (ref 80–100)
PHOSPHATE SERPL-MCNC: 1.5 MG/DL — LOW (ref 2.4–4.7)
PLATELET # BLD AUTO: 132 K/UL — LOW (ref 150–400)
POTASSIUM SERPL-MCNC: 3.7 MMOL/L — SIGNIFICANT CHANGE UP (ref 3.5–5.3)
POTASSIUM SERPL-SCNC: 3.7 MMOL/L — SIGNIFICANT CHANGE UP (ref 3.5–5.3)
RBC # BLD: 3.77 M/UL — LOW (ref 3.8–5.2)
RBC # FLD: 13.7 % — SIGNIFICANT CHANGE UP (ref 10.3–14.5)
SODIUM SERPL-SCNC: 136 MMOL/L — SIGNIFICANT CHANGE UP (ref 135–145)
SPECIMEN SOURCE: SIGNIFICANT CHANGE UP
WBC # BLD: 6.68 K/UL — SIGNIFICANT CHANGE UP (ref 3.8–10.5)
WBC # FLD AUTO: 6.68 K/UL — SIGNIFICANT CHANGE UP (ref 3.8–10.5)

## 2022-08-07 PROCEDURE — 99291 CRITICAL CARE FIRST HOUR: CPT

## 2022-08-07 PROCEDURE — 99222 1ST HOSP IP/OBS MODERATE 55: CPT

## 2022-08-07 PROCEDURE — 71045 X-RAY EXAM CHEST 1 VIEW: CPT | Mod: 26

## 2022-08-07 RX ORDER — PIPERACILLIN AND TAZOBACTAM 4; .5 G/20ML; G/20ML
3.38 INJECTION, POWDER, LYOPHILIZED, FOR SOLUTION INTRAVENOUS ONCE
Refills: 0 | Status: COMPLETED | OUTPATIENT
Start: 2022-08-07 | End: 2022-08-07

## 2022-08-07 RX ORDER — SENNA PLUS 8.6 MG/1
2 TABLET ORAL AT BEDTIME
Refills: 0 | Status: DISCONTINUED | OUTPATIENT
Start: 2022-08-07 | End: 2022-08-11

## 2022-08-07 RX ORDER — POTASSIUM PHOSPHATE, MONOBASIC POTASSIUM PHOSPHATE, DIBASIC 236; 224 MG/ML; MG/ML
30 INJECTION, SOLUTION INTRAVENOUS ONCE
Refills: 0 | Status: COMPLETED | OUTPATIENT
Start: 2022-08-07 | End: 2022-08-07

## 2022-08-07 RX ORDER — POLYETHYLENE GLYCOL 3350 17 G/17G
17 POWDER, FOR SOLUTION ORAL DAILY
Refills: 0 | Status: DISCONTINUED | OUTPATIENT
Start: 2022-08-07 | End: 2022-08-11

## 2022-08-07 RX ORDER — PIPERACILLIN AND TAZOBACTAM 4; .5 G/20ML; G/20ML
3.38 INJECTION, POWDER, LYOPHILIZED, FOR SOLUTION INTRAVENOUS EVERY 8 HOURS
Refills: 0 | Status: DISCONTINUED | OUTPATIENT
Start: 2022-08-07 | End: 2022-08-11

## 2022-08-07 RX ADMIN — Medication 25 MILLIGRAM(S): at 17:12

## 2022-08-07 RX ADMIN — Medication 1 TABLET(S): at 12:09

## 2022-08-07 RX ADMIN — Medication 650 MILLIGRAM(S): at 13:15

## 2022-08-07 RX ADMIN — PIPERACILLIN AND TAZOBACTAM 200 GRAM(S): 4; .5 INJECTION, POWDER, LYOPHILIZED, FOR SOLUTION INTRAVENOUS at 03:14

## 2022-08-07 RX ADMIN — Medication 25 MILLIGRAM(S): at 05:54

## 2022-08-07 RX ADMIN — Medication 3 MILLILITER(S): at 07:41

## 2022-08-07 RX ADMIN — CHLORHEXIDINE GLUCONATE 1 APPLICATION(S): 213 SOLUTION TOPICAL at 12:09

## 2022-08-07 RX ADMIN — SODIUM CHLORIDE 2 GRAM(S): 9 INJECTION INTRAMUSCULAR; INTRAVENOUS; SUBCUTANEOUS at 15:12

## 2022-08-07 RX ADMIN — Medication 3 MILLIGRAM(S): at 21:15

## 2022-08-07 RX ADMIN — Medication 650 MILLIGRAM(S): at 04:55

## 2022-08-07 RX ADMIN — SENNA PLUS 2 TABLET(S): 8.6 TABLET ORAL at 21:06

## 2022-08-07 RX ADMIN — Medication 650 MILLIGRAM(S): at 12:12

## 2022-08-07 RX ADMIN — Medication 650 MILLIGRAM(S): at 03:55

## 2022-08-07 RX ADMIN — ENOXAPARIN SODIUM 40 MILLIGRAM(S): 100 INJECTION SUBCUTANEOUS at 17:12

## 2022-08-07 RX ADMIN — ATORVASTATIN CALCIUM 20 MILLIGRAM(S): 80 TABLET, FILM COATED ORAL at 21:06

## 2022-08-07 RX ADMIN — SODIUM CHLORIDE 2 GRAM(S): 9 INJECTION INTRAMUSCULAR; INTRAVENOUS; SUBCUTANEOUS at 05:54

## 2022-08-07 RX ADMIN — Medication 81 MILLIGRAM(S): at 12:09

## 2022-08-07 RX ADMIN — PIPERACILLIN AND TAZOBACTAM 25 GRAM(S): 4; .5 INJECTION, POWDER, LYOPHILIZED, FOR SOLUTION INTRAVENOUS at 20:11

## 2022-08-07 RX ADMIN — POTASSIUM PHOSPHATE, MONOBASIC POTASSIUM PHOSPHATE, DIBASIC 83.33 MILLIMOLE(S): 236; 224 INJECTION, SOLUTION INTRAVENOUS at 12:08

## 2022-08-07 RX ADMIN — SODIUM CHLORIDE 2 GRAM(S): 9 INJECTION INTRAMUSCULAR; INTRAVENOUS; SUBCUTANEOUS at 21:06

## 2022-08-07 RX ADMIN — Medication 3 MILLILITER(S): at 01:48

## 2022-08-07 RX ADMIN — PIPERACILLIN AND TAZOBACTAM 25 GRAM(S): 4; .5 INJECTION, POWDER, LYOPHILIZED, FOR SOLUTION INTRAVENOUS at 12:08

## 2022-08-07 RX ADMIN — POLYETHYLENE GLYCOL 3350 17 GRAM(S): 17 POWDER, FOR SOLUTION ORAL at 12:08

## 2022-08-07 RX ADMIN — Medication 3 MILLILITER(S): at 14:53

## 2022-08-07 RX ADMIN — Medication 3 MILLILITER(S): at 20:51

## 2022-08-07 NOTE — PROGRESS NOTE ADULT - ASSESSMENT
Assessment/Plan:   87 yo woman with HTN, HLD and breast cancer in remission, mRS 0 at baseline, admitted 8/5 with R sided weakness and aphasia, NIHSS 23, s/p tPA. Initial CTA with a L supraclinoid carotid occlusion, with angiogram showing a L M1 occlusion s/p TICI 3 recanalization. Mechanism of stroke is ESUS (likely Afib in patient's age group) vs less likely cancer related hypercoagulability   LDL 72, HgA1C 5.7.    Neuro:   - q1 neuro checks   - post thrombectomy care / post tpa care   - CT - l parietal infarct - cytoxic edema   - Na - goal 140 < 145  - MRI non con brain, MRA neck (poor eval of carotids on CTA)  - ASA ; atorvastatin 20 mg daily   - EKG    Cards: HTN ; Stage 2 Diastolic Dysfunction  - Card consult for Loop recorder   - Maintain monitoring for arrythmia  - -< 150  - metoprolol 25 mg BID (home med)  - hold home amlodipine 2.5 mg daily  - confirmed  home meds     Resp: Asp risk/ Resolving pul infiltrate  - Pul toilet  - RML infiltrate- Pip/Tazo x7 days  - MRSA nasal swab - neg  -extubated 8/5/22  - Mouth care and pul toilet   -Pul edema - improving  -Lasix 20mg X1 IV- 8/6/22  - Maintain O2 Sats > 92 %       GI:  - NPO for today monitor resp status   - Start entreal feeds today   - Dysphagia fail  - stool softeners  - D/C protonix       :   - Bladder scan after F/U output with lasix       ID: Fever  - ? Microaspiration   UA- neg   - No active issues  - No evidence of thrombophlebitis    Heme:  Chronic Thrombocytopenia  - Will monitor- stable    - Baseline  ---> 132  - SCDs  - lovenox - 40mg q day SQ   - Doppler B/L LE - neg   Assessment/Plan:   89 yo woman with HTN, HLD and breast cancer in remission, mRS 0 at baseline, admitted 8/5 with R sided weakness and aphasia, NIHSS 23, s/p tPA. Initial CTA with a L supraclinoid carotid occlusion, with angiogram showing a L M1 occlusion s/p TICI 3 recanalization. Mechanism of stroke is ESUS (likely Afib in patient's age group) vs less likely cancer related hypercoagulability   LDL 72, HgA1C 5.7.    Neuro:   - q1 neuro checks   - post thrombectomy care / post tpa care   - CT - l parietal infarct - cytoxic edema   - Na - goal 140 < 145  - MRI non con brain, MRA neck (poor eval of carotids on CTA)  - ASA ; atorvastatin 20 mg daily   - EKG    Cards: HTN ; Stage 2 Diastolic Dysfunction  - Card consult for Loop recorder   - Maintain monitoring for arrythmia  - -< 150  - metoprolol 25 mg BID (home med)  - hold home amlodipine 2.5 mg daily  - confirmed  home meds     Resp: Asp risk/ Resolving pul infiltrate  - Pul toilet  - RML infiltrate- Pip/Tazo x7 days- day 1/7   - MRSA nasal swab - neg  - extubated 8/5/22  - Mouth care and pul toilet   -Pul edema - improving  -Lasix 20mg X1 IV- 8/6/22  - Maintain O2 Sats > 92 %       GI:  - Jevity - advance feeds   - Dysphagia fail  - stool softeners  - D/C protonix     :   - Bladder scan after F/U output with lasix    End   LDL- 72  HGBA1C- 5.7  TSH - 2.41       ID: Fever  - ? Microaspiration   - send sputum  - MRSA nasal neg   - UA- neg   - No active issues  - No evidence of thrombophlebitis    Heme:  Chronic Thrombocytopenia  - Will monitor- stable    - Baseline  ---> 132  - SCDs  - lovenox - 40mg q day SQ   - Doppler B/L LE - neg

## 2022-08-07 NOTE — CONSULT NOTE ADULT - SUBJECTIVE AND OBJECTIVE BOX
Neurology consult    JAISON GOODMAN 88y Female     Patient is a 88y old  Female who presents with a chief complaint of Stroke (07 Aug 2022 08:24)      HPI:  88F last known well at 2:15pm, found around 330pm to have aphasia and right sided weakness. Presents to [facility] as code stroke found to have L MCA occlusion. Given TPA at 4:05pm and intubated for airway protection. Transferred to [facility] for mechanical thrombectomy, NIH 23, MRS 0 (04 Aug 2022 18:45)    PMH: HTN (hypertension)    Hypercholesteremia    Breast cancer         PSH: No significant past surgical history          FAMILY HISTORY:  No pertinent family history in first degree relatives        SOCIAL HISTORY:  No history of tobacco or alcohol use     Allergies    No Known Allergies    Intolerances            Vital Signs Last 24 Hrs  T(C): 37.9 (07 Aug 2022 13:00), Max: 38.5 (07 Aug 2022 02:30)  T(F): 100.2 (07 Aug 2022 13:00), Max: 101.3 (07 Aug 2022 02:30)  HR: 78 (07 Aug 2022 13:00) (66 - 98)  BP: 118/60 (07 Aug 2022 13:00) (105/61 - 149/50)  BP(mean): 78 (07 Aug 2022 13:00) (59 - 107)  RR: 12 (07 Aug 2022 13:00) (12 - 29)  SpO2: 98% (07 Aug 2022 13:00) (90% - 100%)    Parameters below as of 07 Aug 2022 13:00  Patient On (Oxygen Delivery Method): nasal cannula  O2 Flow (L/min): 2    MEDICATIONS    acetaminophen    Suspension .. 650 milliGRAM(s) Oral every 6 hours PRN  ALBUTerol    90 MICROgram(s) HFA Inhaler 2 Puff(s) Inhalation every 6 hours PRN  albuterol/ipratropium for Nebulization 3 milliLiter(s) Nebulizer every 6 hours  aspirin  chewable 81 milliGRAM(s) Oral daily  atorvastatin 20 milliGRAM(s) Oral at bedtime  chlorhexidine 2% Cloths 1 Application(s) Topical daily  enoxaparin Injectable 40 milliGRAM(s) SubCutaneous every 24 hours  melatonin 3 milliGRAM(s) Oral at bedtime  metoprolol tartrate 25 milliGRAM(s) Oral two times a day  multivitamin 1 Tablet(s) Oral daily  piperacillin/tazobactam IVPB.. 3.375 Gram(s) IV Intermittent every 8 hours  polyethylene glycol 3350 17 Gram(s) Oral daily  senna 2 Tablet(s) Oral at bedtime  sodium chloride 2 Gram(s) Oral three times a day        LABS:  CBC Full  -  ( 07 Aug 2022 02:30 )  WBC Count : 6.68 K/uL  RBC Count : 3.77 M/uL  Hemoglobin : 11.5 g/dL  Hematocrit : 34.2 %  Platelet Count - Automated : 132 K/uL  Mean Cell Volume : 90.7 fl  Mean Cell Hemoglobin : 30.5 pg  Mean Cell Hemoglobin Concentration : 33.6 gm/dL  Auto Neutrophil # : x  Auto Lymphocyte # : x  Auto Monocyte # : x  Auto Eosinophil # : x  Auto Basophil # : x  Auto Neutrophil % : x  Auto Lymphocyte % : x  Auto Monocyte % : x  Auto Eosinophil % : x  Auto Basophil % : x    Urinalysis Basic - ( 06 Aug 2022 00:45 )    Color: Yellow / Appearance: Clear / S.015 / pH: x  Gluc: x / Ketone: Negative  / Bili: Negative / Urobili: Negative mg/dL   Blood: x / Protein: Negative / Nitrite: Negative   Leuk Esterase: Negative / RBC: 3-5 /HPF / WBC Negative /HPF   Sq Epi: x / Non Sq Epi: Occasional / Bacteria: x      08-    136  |  104  |  11.9  ----------------------------<  121<H>  3.7   |  23.0  |  0.56    Ca    8.5      07 Aug 2022 02:30  Phos  1.5     08-07  Mg     2.0     08-07    TPro  5.8<L>  /  Alb  3.4  /  TBili  1.6  /  DBili  0.3  /  AST  21  /  ALT  10  /  AlkPhos  45  08-06    LIVER FUNCTIONS - ( 06 Aug 2022 02:18 )  Alb: 3.4 g/dL / Pro: 5.8 g/dL / ALK PHOS: 45 U/L / ALT: 10 U/L / AST: 21 U/L / GGT: x           Hemoglobin A1C:         On Neurological Examination:    Mental Status - Patient is  lethargic easily arousable. Not verbal and not following commands.  Cranial Nerves - PERRL, EOM--  Has a left gaze deviation which can be partially overcome with oculocephalics  Visual fields  Blinked to Threat on the left and not on right. Has a right central facial weakness.    Motor Exam -   Right upper and Lower ---0/5   Left upper  and lower moves briskly.    Sensory    percieves pain bilaterally.    Coord: FTN - not testable.  Gait -  Not assessed.        RADIOLOGY ( All neurological imaging studies were independently reviewed and interpreted by me)  CTH   CTA  CTP      CT Head No Cont (22 @ 14:36) >    IMPRESSION: Acute left ADRYAN, left MCA, & right PCA territory infarctions.   No evidence of hemorrhagic transformation.    GITA TAMAYO MD; Attending Radiologist      MRI:  TTE    TTE Echo Complete w/ Contrast w/ Doppler (22 @ 09:45) >  Summary:   1. Technically difficult study.   2. Hyperdynamic global left ventricular systolic function.   3. Left ventricular ejection fraction, by visual estimation, is 70 to   75%.   4. Spectral Doppler shows pseudonormal pattern of left ventricular   myocardial filling (Grade II diastolic dysfunction).   5. Normal right ventricular size and function.   6. The left atrium is normal in size.   7. Mild to moderate aortic valve stenosis.   8. Moderate aortic regurgitation.   9. Mild thickening of the anterior and posterior mitral valve leaflets.  10. Mild mitral annular calcification.  11. Mild to moderate mitral valve regurgitation.  12. Moderate tricuspid regurgitation.  13. Estimated pulmonary artery systolic pressure is 43.4 mmHg assuming a   right atrial pressureof 3 mmHg, which is consistent with mild pulmonary   hypertension.  14. There is no evidence of pericardial effusion.    MD Nakia Electronically signed on 2022 at 1:51:31 PM     IR Neuro (22 @ 19:47) >    IMPRESSION:  Supervision and Interpretation:  1. Complete occlusion of the M1 segment of the left middle cerebral   artery at its origin.    2. Successful complete TICI3 revascularization of the left middle   cerebral artery utilizing direct contact aspiration.

## 2022-08-07 NOTE — PROGRESS NOTE ADULT - SUBJECTIVE AND OBJECTIVE BOX
HPI:   89 yo woman with HTN, HLD and breast cancer in remission, mRS 0 at baseline, admitted 8/5 with R sided weakness and aphasia, NIHSS 23, s/p tPA. Initial CTA with a L supraclinoid carotid occlusion, with angiogram showing a L M1 occlusion s/p TICI 3 recanalization.   NSCU intubated 2/2 low TV.   Extubated 8/5/22    ICU Vital Signs Last 24 Hrs  T(C): 37.9 (07 Aug 2022 07:00), Max: 38.5 (07 Aug 2022 02:30)  T(F): 100.2 (07 Aug 2022 07:00), Max: 101.3 (07 Aug 2022 02:30)  HR: 82 (07 Aug 2022 07:44) (65 - 98)  BP: 123/61 (07 Aug 2022 07:00) (105/61 - 149/50)  BP(mean): 80 (07 Aug 2022 07:00) (59 - 102)  RR: 17 (07 Aug 2022 07:00) (16 - 29)  SpO2: 99% (07 Aug 2022 07:44) (90% - 100%)    O2 Parameters below as of 07 Aug 2022 07:44  Patient On (Oxygen Delivery Method): nasal cannula,4 lpm      06 Aug 2022 07:01  -  07 Aug 2022 07:00  --------------------------------------------------------  IN:    Enteral Tube Flush: 30 mL    IV PiggyBack: 100 mL    multiple electrolytes Injection Type 1.: 300 mL    Oral Fluid: 100 mL  Total IN: 530 mL    OUT:    Indwelling Catheter - Urethral (mL): 827 mL  Total OUT: 827 mL    Total NET: -297 mL          05 Aug 2022 07:01  -  06 Aug 2022 07:00    Total NET: 2001.6 mL      MEDICATIONS  (STANDING):  albuterol/ipratropium for Nebulization 3 milliLiter(s) Nebulizer every 6 hours  aspirin  chewable 81 milliGRAM(s) Oral daily  atorvastatin 20 milliGRAM(s) Oral at bedtime  chlorhexidine 2% Cloths 1 Application(s) Topical daily  enoxaparin Injectable 40 milliGRAM(s) SubCutaneous every 24 hours  melatonin 3 milliGRAM(s) Oral at bedtime  metoprolol tartrate 25 milliGRAM(s) Oral two times a day  piperacillin/tazobactam IVPB.. 3.375 Gram(s) IV Intermittent every 8 hours  sodium chloride 2 Gram(s) Oral three times a day    MEDICATIONS  (PRN):  acetaminophen    Suspension .. 650 milliGRAM(s) Oral every 6 hours PRN Temp greater or equal to 38C (100.4F), Mild Pain (1 - 3)  ALBUTerol    90 MICROgram(s) HFA Inhaler 2 Puff(s) Inhalation every 6 hours PRN Bronchospasm      LABS:      08-06    135  |  101  |  7.1<L>  ----------------------------<  115<H>  3.3<L>   |  24.0  |  0.49<L>    Ca    8.2<L>      06 Aug 2022 02:18  Phos  2.4     08-06  Mg     1.6     08-06    TPro  5.8<L>  /  Alb  3.4  /  TBili  1.6  /  DBili  0.3  /  AST  21  /  ALT  10  /  AlkPhos  45  08-06    Na: 138 (08-05 @ 03:20)  K: 3.5 (08-05 @ 03:20)  Cl: 101 (08-05 @ 03:20)  CO2: 24.0 (08-05 @ 03:20)  BUN: 11.8 (08-05 @ 03:20)  Cr: 0.64 (08-05 @ 03:20)  Glu: 147(08-05 @ 03:20)                          11.5   6.68  )-----------( 132      ( 07 Aug 2022 02:30 )             34.2           Hgb: 11.2 (08-05 @ 03:20)  Hct: 33.5 (08-05 @ 03:20)  WBC: 11.75 (08-05 @ 03:20)  Plt: 157 (08-05 @ 03:20)    INR: 1.44 08-05-22 @ 03:20  PTT: 29.2 08-05-22 @ 03:20         CT Head No Cont (08.05.22 @ 14:36) >  FINDINGS:  Evolving acute left MCA territory infarct involving the left frontal   lobe, left basal ganglia. Acute anterior left temporal lobe and acute   right occipital lobe infarction. Acute anterior inferior left frontal   lobe infarction. No evidence of hemorrhagic transformation. Consider MRI   for further evaluation.    Status post left ocular lens replacement. The orbits, mastoid air cells   and visualized paranasal sinuses are otherwise unremarkable. The   calvarium is intact.Consider MRI as clinically warranted.    IMPRESSION: Acute left ADRYAN, left MCA, & right PCA territory infarctions.   No evidence of hemorrhagic transformation.    < end of copied text >       US Duplex Venous Lower Ext Complete, Bilateral (08.06.22 @ 10:33) >  INTERPRETATION:  CLINICAL INFORMATION: Stroke. Fever. Evaluate for DVT.    COMPARISON: None available.    TECHNIQUE: Duplex sonographyof the BILATERAL LOWER extremity veins with   color and spectral Doppler, with and without compression.    FINDINGS:    RIGHT:  Normal compressibility of the RIGHT common femoral, femoral and popliteal   veins.  Doppler examination shows normal spontaneous and phasic flow.  No RIGHT calf vein thrombosis is detected.    LEFT:  Normal compressibility of the LEFT common femoral, femoral and popliteal   veins.  Doppler examination shows normal spontaneous and phasic flow.  No LEFT calf vein thrombosis is detected.    IMPRESSION:  No evidence of deep venous thrombosis in either lower extremity.    < end of copied text >       TTE Echo Complete w/ Contrast w/ Doppler (08.05.22 @ 09:45) >   1. Technically difficult study.   2. Hyperdynamic global left ventricular systolic function.   3. Left ventricular ejection fraction, by visual estimation, is 70 to   75%.   4. Spectral Doppler shows pseudonormal pattern of left ventricular   myocardial filling (Grade II diastolic dysfunction).   5. Normal right ventricular size and function.   6. The left atrium is normal in size.   7. Mild to moderate aortic valve stenosis.   8. Moderate aortic regurgitation.   9. Mild thickening of the anterior and posterior mitral valve leaflets.  10. Mild mitral annular calcification.  11. Mild to moderate mitral valve regurgitation.  12. Moderate tricuspid regurgitation.  13. Estimated pulmonary artery systolic pressure is 43.4 mmHg assuming a   right atrial pressureof 3 mmHg, which is consistent with mild pulmonary   hypertension.  14. There is no evidence of pericardial effusion.    < end of copied text >       CT Head No Cont (08.05.22 @ 14:36) >  INTERPRETATION:  CLINICAL INDICATIONS:  cva    COMPARISON: CT head dated 8/4/2022    TECHNIQUE: Noncontrast CT of the head. Multiplanar reformations are   submitted.    FINDINGS:  Evolving acute left MCA territory infarct involving the left frontal   lobe, left basal ganglia. Acute anterior left temporal lobe and acute   right occipital lobe infarction. Acute anterior inferior left frontal   lobe infarction. No evidence of hemorrhagic transformation. Consider MRI   for further evaluation.    Status post left ocular lens replacement. The orbits, mastoid air cells   and visualized paranasal sinuses are otherwise unremarkable. The   calvarium is intact.Consider MRI as clinically warranted.    IMPRESSION: Acute left ADRYAN, left MCA, & right PCA territory infarctions.   No evidence of hemorrhagic transformation.       US Duplex Venous Lower Ext Complete, Bilateral (11.18.17 @ 09:29) >  INTERPRETATION:  CLINICAL INFORMATION: Leg swelling    COMPARISON: None available.    TECHNIQUE: Duplex sonography of the BILATERAL LOWER extremities with   color and spectral Doppler, with and without compression.      FINDINGS:    There is normal compressibility of the bilateral common femoral, femoral   and popliteal veins. No calf vein thrombosis is detected.    Doppler examination shows normal spontaneous and phasic flow.    IMPRESSION:     No evidence of bilateral lower extremity deep venous thrombosis.        EXAMINATION:  General:  in NAD  HEENT:  intubated  Neuro:  opens eyes to voice, attends, does not follows commands, with L gaze preference, L side AG, R arm 1/5 to noxious, R leg 2/5 flexion to noxious   Cards:  RRR  Respiratory:  no respiratory distress  Abdomen:  soft  Extremities:  no LE edema         HPI:   89 yo woman with HTN, HLD and breast cancer in remission, mRS 0 at baseline, admitted 8/5 with R sided weakness and aphasia, NIHSS 23, s/p tPA. Initial CTA with a L supraclinoid carotid occlusion, with angiogram showing a L M1 occlusion s/p TICI 3 recanalization.   NSCU intubated 2/2 low TV.   Extubated 8/5/22    ICU Vital Signs Last 24 Hrs  T(C): 37.9 (07 Aug 2022 07:00), Max: 38.5 (07 Aug 2022 02:30)  T(F): 100.2 (07 Aug 2022 07:00), Max: 101.3 (07 Aug 2022 02:30)  HR: 82 (07 Aug 2022 07:44) (65 - 98)  BP: 123/61 (07 Aug 2022 07:00) (105/61 - 149/50)  BP(mean): 80 (07 Aug 2022 07:00) (59 - 102)  RR: 17 (07 Aug 2022 07:00) (16 - 29)  SpO2: 99% (07 Aug 2022 07:44) (90% - 100%)    O2 Parameters below as of 07 Aug 2022 07:44  Patient On (Oxygen Delivery Method): nasal cannula,4 lpm      06 Aug 2022 07:01  -  07 Aug 2022 07:00  --------------------------------------------------------  IN:    Enteral Tube Flush: 30 mL    IV PiggyBack: 100 mL    multiple electrolytes Injection Type 1.: 300 mL    Oral Fluid: 100 mL  Total IN: 530 mL    OUT:    Indwelling Catheter - Urethral (mL): 827 mL  Total OUT: 827 mL    Total NET: -297 mL          05 Aug 2022 07:01  -  06 Aug 2022 07:00    Total NET: 2001.6 mL      MEDICATIONS  (STANDING):  albuterol/ipratropium for Nebulization 3 milliLiter(s) Nebulizer every 6 hours  aspirin  chewable 81 milliGRAM(s) Oral daily  atorvastatin 20 milliGRAM(s) Oral at bedtime  chlorhexidine 2% Cloths 1 Application(s) Topical daily  enoxaparin Injectable 40 milliGRAM(s) SubCutaneous every 24 hours  melatonin 3 milliGRAM(s) Oral at bedtime  metoprolol tartrate 25 milliGRAM(s) Oral two times a day  piperacillin/tazobactam IVPB.. 3.375 Gram(s) IV Intermittent every 8 hours  sodium chloride 2 Gram(s) Oral three times a day    MEDICATIONS  (PRN):  acetaminophen    Suspension .. 650 milliGRAM(s) Oral every 6 hours PRN Temp greater or equal to 38C (100.4F), Mild Pain (1 - 3)  ALBUTerol    90 MICROgram(s) HFA Inhaler 2 Puff(s) Inhalation every 6 hours PRN Bronchospasm      LABS:  08-07    136  |  104  |  11.9  ----------------------------<  121<H>  3.7   |  23.0  |  0.56    Ca    8.5      07 Aug 2022 02:30  Phos  1.5     08-07  Mg     2.0     08-07    TPro  5.8<L>  /  Alb  3.4  /  TBili  1.6  /  DBili  0.3  /  AST  21  /  ALT  10  /  AlkPhos  45  08-06 08-06    135  |  101  |  7.1<L>  ----------------------------<  115<H>  3.3<L>   |  24.0  |  0.49<L>    Ca    8.2<L>      06 Aug 2022 02:18  Phos  2.4     08-06  Mg     1.6     08-06    TPro  5.8<L>  /  Alb  3.4  /  TBili  1.6  /  DBili  0.3  /  AST  21  /  ALT  10  /  AlkPhos  45  08-06    Na: 138 (08-05 @ 03:20)  K: 3.5 (08-05 @ 03:20)  Cl: 101 (08-05 @ 03:20)  CO2: 24.0 (08-05 @ 03:20)  BUN: 11.8 (08-05 @ 03:20)  Cr: 0.64 (08-05 @ 03:20)  Glu: 147(08-05 @ 03:20)                          11.5   6.68  )-----------( 132      ( 07 Aug 2022 02:30 )             34.2           Hgb: 11.2 (08-05 @ 03:20)  Hct: 33.5 (08-05 @ 03:20)  WBC: 11.75 (08-05 @ 03:20)  Plt: 157 (08-05 @ 03:20)    INR: 1.44 08-05-22 @ 03:20  PTT: 29.2 08-05-22 @ 03:20         CT Head No Cont (08.05.22 @ 14:36) >  FINDINGS:  Evolving acute left MCA territory infarct involving the left frontal   lobe, left basal ganglia. Acute anterior left temporal lobe and acute   right occipital lobe infarction. Acute anterior inferior left frontal   lobe infarction. No evidence of hemorrhagic transformation. Consider MRI   for further evaluation.    Status post left ocular lens replacement. The orbits, mastoid air cells   and visualized paranasal sinuses are otherwise unremarkable. The   calvarium is intact.Consider MRI as clinically warranted.    IMPRESSION: Acute left ADRYAN, left MCA, & right PCA territory infarctions.   No evidence of hemorrhagic transformation.    < end of copied text >       US Duplex Venous Lower Ext Complete, Bilateral (08.06.22 @ 10:33) >  INTERPRETATION:  CLINICAL INFORMATION: Stroke. Fever. Evaluate for DVT.    COMPARISON: None available.    TECHNIQUE: Duplex sonographyof the BILATERAL LOWER extremity veins with   color and spectral Doppler, with and without compression.    FINDINGS:    RIGHT:  Normal compressibility of the RIGHT common femoral, femoral and popliteal   veins.  Doppler examination shows normal spontaneous and phasic flow.  No RIGHT calf vein thrombosis is detected.    LEFT:  Normal compressibility of the LEFT common femoral, femoral and popliteal   veins.  Doppler examination shows normal spontaneous and phasic flow.  No LEFT calf vein thrombosis is detected.    IMPRESSION:  No evidence of deep venous thrombosis in either lower extremity.    < end of copied text >       TTE Echo Complete w/ Contrast w/ Doppler (08.05.22 @ 09:45) >   1. Technically difficult study.   2. Hyperdynamic global left ventricular systolic function.   3. Left ventricular ejection fraction, by visual estimation, is 70 to   75%.   4. Spectral Doppler shows pseudonormal pattern of left ventricular   myocardial filling (Grade II diastolic dysfunction).   5. Normal right ventricular size and function.   6. The left atrium is normal in size.   7. Mild to moderate aortic valve stenosis.   8. Moderate aortic regurgitation.   9. Mild thickening of the anterior and posterior mitral valve leaflets.  10. Mild mitral annular calcification.  11. Mild to moderate mitral valve regurgitation.  12. Moderate tricuspid regurgitation.  13. Estimated pulmonary artery systolic pressure is 43.4 mmHg assuming a   right atrial pressureof 3 mmHg, which is consistent with mild pulmonary   hypertension.  14. There is no evidence of pericardial effusion.    < end of copied text >       CT Head No Cont (08.05.22 @ 14:36) >  INTERPRETATION:  CLINICAL INDICATIONS:  cva    COMPARISON: CT head dated 8/4/2022    TECHNIQUE: Noncontrast CT of the head. Multiplanar reformations are   submitted.    FINDINGS:  Evolving acute left MCA territory infarct involving the left frontal   lobe, left basal ganglia. Acute anterior left temporal lobe and acute   right occipital lobe infarction. Acute anterior inferior left frontal   lobe infarction. No evidence of hemorrhagic transformation. Consider MRI   for further evaluation.    Status post left ocular lens replacement. The orbits, mastoid air cells   and visualized paranasal sinuses are otherwise unremarkable. The   calvarium is intact.Consider MRI as clinically warranted.    IMPRESSION: Acute left ADRYAN, left MCA, & right PCA territory infarctions.   No evidence of hemorrhagic transformation.       US Duplex Venous Lower Ext Complete, Bilateral (11.18.17 @ 09:29) >  INTERPRETATION:  CLINICAL INFORMATION: Leg swelling    COMPARISON: None available.    TECHNIQUE: Duplex sonography of the BILATERAL LOWER extremities with   color and spectral Doppler, with and without compression.      FINDINGS:    There is normal compressibility of the bilateral common femoral, femoral   and popliteal veins. No calf vein thrombosis is detected.    Doppler examination shows normal spontaneous and phasic flow.    IMPRESSION:     No evidence of bilateral lower extremity deep venous thrombosis.        EXAMINATION:  General:  in NAD  HEENT:  intubated  Neuro:  opens eyes to voice, attends, does not follows commands, with L gaze preference, L side AG, R arm min withdrawal , R leg 2/5 flexion to noxious   Cards:  2/6 systolic murmur LLSB   Respiratory:  no respiratory distress, upper airway guttural sounds  Abdomen:  soft  Extremities:  no LE edema

## 2022-08-07 NOTE — PROGRESS NOTE ADULT - ASSESSMENT
88F hx HTN, HLD and breast cancer in remission, mRS 0 at baseline, admitted 8/5 with R sided weakness and aphasia, NIHSS 23, found with L MCA m1 occlusion s/p tPA & M.T. TICI 3 recanalization now POD 3  Stroke Day #3    PLAN  - Discussed with Dr. Jimenez   - Q2 hour neuro checks   -HOB elevated 30 degrees, neck midline  - SBP goal 100-150 s/p thrombectomy. continue home dose lopressor  -pending MRI  - TTE performed: EF 70-75%, GII DD, mod AS, mod AR, mild MR, mod TR, mild pulm HTN   -pending cardiology consult for stroke work up  -stroke core measures A1C 5.6, LDL 72 continue statin  - DVT prophylaxis; SCDs, lovenox 40   -PT/OT/SLP as appropriate.   -may require PEG tube  -Chest PT/ pulm toileting/ Incentive spirometry PRN  - Further plan per NSICU team

## 2022-08-07 NOTE — PROGRESS NOTE ADULT - SUBJECTIVE AND OBJECTIVE BOX
HPI:  88F last known well at 2:15pm, found around 330pm to have aphasia and right sided weakness. Presents to [facility] as code stroke found to have L MCA occlusion. Given TPA at 4:05pm and intubated for airway protection. Transferred to [facility] for mechanical thrombectomy, NIH 23, MRS 0 (04 Aug 2022 18:45)      INTERVAL HPI/OVERNIGHT EVENTS:  POD 2 m.t. TICI 3 recanalization. likely aspiration pneumonia on zosyn    Vital Signs Last 24 Hrs  T(C): 37.7 (07 Aug 2022 17:00), Max: 38.5 (07 Aug 2022 02:30)  T(F): 99.9 (07 Aug 2022 17:00), Max: 101.3 (07 Aug 2022 02:30)  HR: 92 (07 Aug 2022 17:00) (66 - 98)  BP: 121/68 (07 Aug 2022 17:00) (106/62 - 149/50)  BP(mean): 82 (07 Aug 2022 17:00) (59 - 107)  RR: 20 (07 Aug 2022 17:00) (12 - 29)  SpO2: 99% (07 Aug 2022 17:00) (90% - 100%)    Parameters below as of 07 Aug 2022 17:00  Patient On (Oxygen Delivery Method): nasal cannula  O2 Flow (L/min): 2      PHYSICAL EXAM:  GENERAL: NAD, no accessory muscle use. 2L NC  HEAD:  Atraumatic, normocephalic  WOUND: Dressing clean dry intact  ARACELIS COMA SCORE: E- V- M- =10       E:  3= to voice        V:  2= incomprehensible sounds        M:  5= localizes   MENTAL STATUS: Opens eyes to voice; Says few words- mostly incomprehensible, not following commands  CRANIAL NERVES: L gaze preference, does not cross to right. Attends tracker towards left side. R facial. speech slurred and incomprehensible   REFLEXES: PERRL.   MOTOR: LUE AG, LLE 2/5, RUE minimal withdrawal/ RLE weak withdrawal  COORDINATION: Gait not assessed    LABS:                        11.5   6.68  )-----------( 132      ( 07 Aug 2022 02:30 )             34.2     08-07    136  |  104  |  11.9  ----------------------------<  121<H>  3.7   |  23.0  |  0.56    Ca    8.5      07 Aug 2022 02:30  Phos  1.5     08-  Mg     2.0     08-    TPro  5.8<L>  /  Alb  3.4  /  TBili  1.6  /  DBili  0.3  /  AST  21  /  ALT  10  /  AlkPhos  45        Urinalysis Basic - ( 06 Aug 2022 00:45 )    Color: Yellow / Appearance: Clear / S.015 / pH: x  Gluc: x / Ketone: Negative  / Bili: Negative / Urobili: Negative mg/dL   Blood: x / Protein: Negative / Nitrite: Negative   Leuk Esterase: Negative / RBC: 3-5 /HPF / WBC Negative /HPF   Sq Epi: x / Non Sq Epi: Occasional / Bacteria: x         @ 07:01  -   @ 07:00  --------------------------------------------------------  IN: 530 mL / OUT: 827 mL / NET: -297 mL     @ 07: @ 17:38  --------------------------------------------------------  IN: 829.8 mL / OUT: 110 mL / NET: 719.8 mL        RADIOLOGY & ADDITIONAL TESTS:  < from: CT Head No Cont (22 @ 14:36) >  ACC: [id number] EXAM:  CT BRAIN                          PROCEDURE DATE:  2022          INTERPRETATION:  CLINICAL INDICATIONS:  cva    COMPARISON: CT head dated 2022    TECHNIQUE: Noncontrast CT of the head. Multiplanar reformations are   submitted.    FINDINGS:  Evolving acute left MCA territory infarct involving the left frontal   lobe, left basal ganglia. Acute anterior left temporal lobe and acute   right occipital lobe infarction. Acute anterior inferior left frontal   lobe infarction. No evidence of hemorrhagic transformation. Consider MRI   for further evaluation.    Status post left ocular lens replacement. The orbits, mastoid air cells   and visualized paranasal sinuses are otherwise unremarkable. The   calvarium is intact.Consider MRI as clinically warranted.    IMPRESSION: Acute left ADRYAN, left MCA, & right PCA territory infarctions.   No evidence of hemorrhagic transformation.    --- End of Report ---            GITA TAMAYO MD; Attending Radiologist  This document hasbeen electronically signed. Aug  5 2022  2:56PM    < end of copied text >

## 2022-08-08 DIAGNOSIS — I63.9 CEREBRAL INFARCTION, UNSPECIFIED: ICD-10-CM

## 2022-08-08 DIAGNOSIS — I10 ESSENTIAL (PRIMARY) HYPERTENSION: ICD-10-CM

## 2022-08-08 DIAGNOSIS — E78.00 PURE HYPERCHOLESTEROLEMIA, UNSPECIFIED: ICD-10-CM

## 2022-08-08 LAB
ANION GAP SERPL CALC-SCNC: 11 MMOL/L — SIGNIFICANT CHANGE UP (ref 5–17)
BUN SERPL-MCNC: 13.6 MG/DL — SIGNIFICANT CHANGE UP (ref 8–20)
CALCIUM SERPL-MCNC: 8.3 MG/DL — LOW (ref 8.4–10.5)
CHLORIDE SERPL-SCNC: 107 MMOL/L — SIGNIFICANT CHANGE UP (ref 98–107)
CO2 SERPL-SCNC: 24 MMOL/L — SIGNIFICANT CHANGE UP (ref 22–29)
CREAT SERPL-MCNC: 0.57 MG/DL — SIGNIFICANT CHANGE UP (ref 0.5–1.3)
EGFR: 87 ML/MIN/1.73M2 — SIGNIFICANT CHANGE UP
GLUCOSE SERPL-MCNC: 156 MG/DL — HIGH (ref 70–99)
HCT VFR BLD CALC: 32 % — LOW (ref 34.5–45)
HGB BLD-MCNC: 10.5 G/DL — LOW (ref 11.5–15.5)
MAGNESIUM SERPL-MCNC: 1.9 MG/DL — SIGNIFICANT CHANGE UP (ref 1.6–2.6)
MCHC RBC-ENTMCNC: 30.1 PG — SIGNIFICANT CHANGE UP (ref 27–34)
MCHC RBC-ENTMCNC: 32.8 GM/DL — SIGNIFICANT CHANGE UP (ref 32–36)
MCV RBC AUTO: 91.7 FL — SIGNIFICANT CHANGE UP (ref 80–100)
PHOSPHATE SERPL-MCNC: 2 MG/DL — LOW (ref 2.4–4.7)
PLATELET # BLD AUTO: 143 K/UL — LOW (ref 150–400)
POTASSIUM SERPL-MCNC: 3.6 MMOL/L — SIGNIFICANT CHANGE UP (ref 3.5–5.3)
POTASSIUM SERPL-SCNC: 3.6 MMOL/L — SIGNIFICANT CHANGE UP (ref 3.5–5.3)
RBC # BLD: 3.49 M/UL — LOW (ref 3.8–5.2)
RBC # FLD: 14.1 % — SIGNIFICANT CHANGE UP (ref 10.3–14.5)
SODIUM SERPL-SCNC: 142 MMOL/L — SIGNIFICANT CHANGE UP (ref 135–145)
TROPONIN T SERPL-MCNC: 0.02 NG/ML — SIGNIFICANT CHANGE UP (ref 0–0.06)
WBC # BLD: 7.85 K/UL — SIGNIFICANT CHANGE UP (ref 3.8–10.5)
WBC # FLD AUTO: 7.85 K/UL — SIGNIFICANT CHANGE UP (ref 3.8–10.5)

## 2022-08-08 PROCEDURE — 99223 1ST HOSP IP/OBS HIGH 75: CPT

## 2022-08-08 PROCEDURE — 99223 1ST HOSP IP/OBS HIGH 75: CPT | Mod: FS

## 2022-08-08 PROCEDURE — 99233 SBSQ HOSP IP/OBS HIGH 50: CPT

## 2022-08-08 PROCEDURE — 93010 ELECTROCARDIOGRAM REPORT: CPT

## 2022-08-08 PROCEDURE — 99232 SBSQ HOSP IP/OBS MODERATE 35: CPT

## 2022-08-08 RX ORDER — MAGNESIUM SULFATE 500 MG/ML
1 VIAL (ML) INJECTION ONCE
Refills: 0 | Status: COMPLETED | OUTPATIENT
Start: 2022-08-08 | End: 2022-08-08

## 2022-08-08 RX ORDER — IPRATROPIUM/ALBUTEROL SULFATE 18-103MCG
3 AEROSOL WITH ADAPTER (GRAM) INHALATION EVERY 6 HOURS
Refills: 0 | Status: DISCONTINUED | OUTPATIENT
Start: 2022-08-08 | End: 2022-08-11

## 2022-08-08 RX ORDER — DOXAZOSIN MESYLATE 4 MG
2 TABLET ORAL AT BEDTIME
Refills: 0 | Status: DISCONTINUED | OUTPATIENT
Start: 2022-08-08 | End: 2022-08-11

## 2022-08-08 RX ORDER — POTASSIUM CHLORIDE 20 MEQ
10 PACKET (EA) ORAL
Refills: 0 | Status: COMPLETED | OUTPATIENT
Start: 2022-08-08 | End: 2022-08-08

## 2022-08-08 RX ORDER — PETROLATUM,WHITE
1 JELLY (GRAM) TOPICAL
Refills: 0 | Status: DISCONTINUED | OUTPATIENT
Start: 2022-08-08 | End: 2022-08-11

## 2022-08-08 RX ADMIN — Medication 62.5 MILLIMOLE(S): at 02:27

## 2022-08-08 RX ADMIN — SODIUM CHLORIDE 2 GRAM(S): 9 INJECTION INTRAMUSCULAR; INTRAVENOUS; SUBCUTANEOUS at 05:12

## 2022-08-08 RX ADMIN — PIPERACILLIN AND TAZOBACTAM 25 GRAM(S): 4; .5 INJECTION, POWDER, LYOPHILIZED, FOR SOLUTION INTRAVENOUS at 21:40

## 2022-08-08 RX ADMIN — Medication 1 APPLICATION(S): at 18:14

## 2022-08-08 RX ADMIN — Medication 100 GRAM(S): at 01:43

## 2022-08-08 RX ADMIN — Medication 650 MILLIGRAM(S): at 11:07

## 2022-08-08 RX ADMIN — Medication 100 MILLIEQUIVALENT(S): at 09:28

## 2022-08-08 RX ADMIN — POLYETHYLENE GLYCOL 3350 17 GRAM(S): 17 POWDER, FOR SOLUTION ORAL at 11:08

## 2022-08-08 RX ADMIN — Medication 25 MILLIGRAM(S): at 18:14

## 2022-08-08 RX ADMIN — SODIUM CHLORIDE 2 GRAM(S): 9 INJECTION INTRAMUSCULAR; INTRAVENOUS; SUBCUTANEOUS at 13:39

## 2022-08-08 RX ADMIN — Medication 3 MILLILITER(S): at 08:07

## 2022-08-08 RX ADMIN — CHLORHEXIDINE GLUCONATE 1 APPLICATION(S): 213 SOLUTION TOPICAL at 11:12

## 2022-08-08 RX ADMIN — Medication 3 MILLIGRAM(S): at 21:41

## 2022-08-08 RX ADMIN — ATORVASTATIN CALCIUM 20 MILLIGRAM(S): 80 TABLET, FILM COATED ORAL at 21:41

## 2022-08-08 RX ADMIN — Medication 81 MILLIGRAM(S): at 11:07

## 2022-08-08 RX ADMIN — Medication 25 MILLIGRAM(S): at 05:13

## 2022-08-08 RX ADMIN — ENOXAPARIN SODIUM 40 MILLIGRAM(S): 100 INJECTION SUBCUTANEOUS at 18:14

## 2022-08-08 RX ADMIN — Medication 100 MILLIEQUIVALENT(S): at 06:27

## 2022-08-08 RX ADMIN — Medication 2 MILLIGRAM(S): at 21:41

## 2022-08-08 RX ADMIN — Medication 100 MILLIEQUIVALENT(S): at 08:03

## 2022-08-08 RX ADMIN — PIPERACILLIN AND TAZOBACTAM 25 GRAM(S): 4; .5 INJECTION, POWDER, LYOPHILIZED, FOR SOLUTION INTRAVENOUS at 11:07

## 2022-08-08 RX ADMIN — SODIUM CHLORIDE 2 GRAM(S): 9 INJECTION INTRAMUSCULAR; INTRAVENOUS; SUBCUTANEOUS at 21:41

## 2022-08-08 RX ADMIN — Medication 1 TABLET(S): at 11:07

## 2022-08-08 RX ADMIN — PIPERACILLIN AND TAZOBACTAM 25 GRAM(S): 4; .5 INJECTION, POWDER, LYOPHILIZED, FOR SOLUTION INTRAVENOUS at 05:11

## 2022-08-08 NOTE — CONSULT NOTE ADULT - ASSESSMENT
88F with remote breast cancer history, HTN, HLD, transferred from [place] with acute stroke s/p TPA, mechanical thrombectomy, now extubated, with dysphagia, being transferred to medical service.     #1 Acute stroke, s/p TPA, mechnical thrombectomy  - CT as above - actue left ADRYAN, left MCA, and right PCA with suspicions for cardioembolic sources  - workup in progress per medical team  - ASA/statin  - MRI pending  - cardiology consult     #2 acute respiratory failure  - extubated  - on nasal cannula  - being treated for possible superimposed aspiration related pneumonia    #3 Dysphagia  - failed speech and swallow 8/8   - NG tube trial feeds  - will see how she does and aid in further discussions regarding dysphagia/feeding tube if needed    #4 Encounter for palliative care  - will continue to follow course  - DNR and DNI  - tube feeds via NG tube for now and repeat swallow testing if improves clinically

## 2022-08-08 NOTE — PROGRESS NOTE ADULT - SUBJECTIVE AND OBJECTIVE BOX
Neurology   JAISON MAXINE 88y Female     HPI:  88F last known well at 2:15pm, found around 330pm to have aphasia and right sided weakness. Presents to [facility] as code stroke found to have L MCA occlusion. Given TPA at 4:05pm and intubated for airway protection. Transferred to [facility] for mechanical thrombectomy, NIH 23, MRS 0 (04 Aug 2022 18:45)  She is - S/P IV tPA and Thrombectomy for L Proximal MCA occlusion with TICI 3 reperfusion on 8-04-22.  PMH: HTN (hypertension)    Hypercholesteremia    Breast cancer         PSH: No significant past surgical history          FAMILY HISTORY:  No pertinent family history in first degree relatives        SOCIAL HISTORY:  No history of tobacco or alcohol use     Allergies    No Known Allergies    Intolerances          Vital Signs Last 24 Hrs  T(C): 36.7 (08 Aug 2022 15:00), Max: 38.3 (08 Aug 2022 11:00)  T(F): 98.1 (08 Aug 2022 15:00), Max: 100.9 (08 Aug 2022 11:00)  HR: 83 (08 Aug 2022 15:00) (69 - 166)  BP: 139/60 (08 Aug 2022 15:00) (104/63 - 154/133)  BP(mean): 82 (08 Aug 2022 15:00) (70 - 142)  RR: 14 (08 Aug 2022 15:00) (13 - 26)  SpO2: 96% (08 Aug 2022 15:00) (95% - 99%)    Parameters below as of 08 Aug 2022 15:00  Patient On (Oxygen Delivery Method): room air        MEDICATIONS    acetaminophen    Suspension .. 650 milliGRAM(s) Oral every 6 hours PRN  ALBUTerol    90 MICROgram(s) HFA Inhaler 2 Puff(s) Inhalation every 6 hours PRN  albuterol/ipratropium for Nebulization 3 milliLiter(s) Nebulizer every 6 hours PRN  AQUAPHOR (petrolatum Ointment) 1 Application(s) Topical two times a day  aspirin  chewable 81 milliGRAM(s) Oral daily  atorvastatin 20 milliGRAM(s) Oral at bedtime  chlorhexidine 2% Cloths 1 Application(s) Topical daily  doxazosin 2 milliGRAM(s) Oral at bedtime  enoxaparin Injectable 40 milliGRAM(s) SubCutaneous every 24 hours  melatonin 3 milliGRAM(s) Oral at bedtime  metoprolol tartrate 25 milliGRAM(s) Oral two times a day  multivitamin 1 Tablet(s) Oral daily  piperacillin/tazobactam IVPB.. 3.375 Gram(s) IV Intermittent every 8 hours  polyethylene glycol 3350 17 Gram(s) Oral daily  senna 2 Tablet(s) Oral at bedtime  sodium chloride 2 Gram(s) Oral three times a day         LABS:  CBC Full  -  ( 08 Aug 2022 00:06 )  WBC Count : 7.85 K/uL  RBC Count : 3.49 M/uL  Hemoglobin : 10.5 g/dL  Hematocrit : 32.0 %  Platelet Count - Automated : 143 K/uL  Mean Cell Volume : 91.7 fl  Mean Cell Hemoglobin : 30.1 pg  Mean Cell Hemoglobin Concentration : 32.8 gm/dL  Auto Neutrophil # : x  Auto Lymphocyte # : x  Auto Monocyte # : x  Auto Eosinophil # : x  Auto Basophil # : x  Auto Neutrophil % : x  Auto Lymphocyte % : x  Auto Monocyte % : x  Auto Eosinophil % : x  Auto Basophil % : x      08-08    142  |  107  |  13.6  ----------------------------<  156<H>  3.6   |  24.0  |  0.57    Ca    8.3<L>      08 Aug 2022 00:06  Phos  2.0     08-08  Mg     1.9     08-08        Hemoglobin A1C:       On Neurological Examination:    Mental Status - Patient is  awake  Verbalizing a few words. Followed occasional command. ( open and close your mouth)  Given choiced said yes to her name correctly..  Cranial Nerves - PERRL, EOM--  Left Gaze deviation has resolved and can look to her right as well.  Visual fields  Blinked to Threat on the left and not on right. Has a right central facial weakness.    Motor Exam -   Right upper extremity 0/5  R LE is 1/5.  Left upper  and lower moves briskly.    Sensory    percieves pain bilaterally.    Coord: FTN - not testable.  Gait -  Not assessed.        RADIOLOGY ( All neurological imaging studies were independently reviewed and interpreted by me)  CT   CTA  CTP      CT Head No Cont (08.05.22 @ 14:36) >    IMPRESSION: Acute left ADRYAN, left MCA, & right PCA territory infarctions.   No evidence of hemorrhagic transformation.    GITA TAMAYO MD; Attending Radiologist      MRI:  TTE    TTE Echo Complete w/ Contrast w/ Doppler (08.05.22 @ 09:45) >  Summary:   1. Technically difficult study.   2. Hyperdynamic global left ventricular systolic function.   3. Left ventricular ejection fraction, by visual estimation, is 70 to   75%.   4. Spectral Doppler shows pseudonormal pattern of left ventricular   myocardial filling (Grade II diastolic dysfunction).   5. Normal right ventricular size and function.   6. The left atrium is normal in size.   7. Mild to moderate aortic valve stenosis.   8. Moderate aortic regurgitation.   9. Mild thickening of the anterior and posterior mitral valve leaflets.  10. Mild mitral annular calcification.  11. Mild to moderate mitral valve regurgitation.  12. Moderate tricuspid regurgitation.  13. Estimated pulmonary artery systolic pressure is 43.4 mmHg assuming a   right atrial pressureof 3 mmHg, which is consistent with mild pulmonary   hypertension.  14. There is no evidence of pericardial effusion.    MD Nakia Electronically signed on 8/5/2022 at 1:51:31 PM     IR Neuro (08.04.22 @ 19:47) >    IMPRESSION:  Supervision and Interpretation:  1. Complete occlusion of the M1 segment of the left middle cerebral   artery at its origin.    2. Successful complete TICI3 revascularization of the left middle   cerebral artery utilizing direct contact aspiration.     Neurology   JAISON MAXINE 88y Female     HPI:  88F last known well at 2:15pm, found around 330pm to have aphasia and right sided weakness. Presents to G[facility] as code stroke found to have L MCA occlusion. Given TPA at 4:05pm and intubated for airway protection. Transferred to [facility] for mechanical thrombectomy, NIH 23, MRS 0 (04 Aug 2022 18:45)  She is - S/P IV tPA and Thrombectomy for L Proximal MCA occlusion with TICI 3 reperfusion on 8-04-22.  PMH: HTN (hypertension)    Hypercholesteremia    Breast cancer         PSH: No significant past surgical history          FAMILY HISTORY:  No pertinent family history in first degree relatives        SOCIAL HISTORY:  No history of tobacco or alcohol use     Allergies    No Known Allergies    Intolerances          Vital Signs Last 24 Hrs  T(C): 36.7 (08 Aug 2022 15:00), Max: 38.3 (08 Aug 2022 11:00)  T(F): 98.1 (08 Aug 2022 15:00), Max: 100.9 (08 Aug 2022 11:00)  HR: 83 (08 Aug 2022 15:00) (69 - 166)  BP: 139/60 (08 Aug 2022 15:00) (104/63 - 154/133)  BP(mean): 82 (08 Aug 2022 15:00) (70 - 142)  RR: 14 (08 Aug 2022 15:00) (13 - 26)  SpO2: 96% (08 Aug 2022 15:00) (95% - 99%)    Parameters below as of 08 Aug 2022 15:00  Patient On (Oxygen Delivery Method): room air        MEDICATIONS    acetaminophen    Suspension .. 650 milliGRAM(s) Oral every 6 hours PRN  ALBUTerol    90 MICROgram(s) HFA Inhaler 2 Puff(s) Inhalation every 6 hours PRN  albuterol/ipratropium for Nebulization 3 milliLiter(s) Nebulizer every 6 hours PRN  AQUAPHOR (petrolatum Ointment) 1 Application(s) Topical two times a day  aspirin  chewable 81 milliGRAM(s) Oral daily  atorvastatin 20 milliGRAM(s) Oral at bedtime  chlorhexidine 2% Cloths 1 Application(s) Topical daily  doxazosin 2 milliGRAM(s) Oral at bedtime  enoxaparin Injectable 40 milliGRAM(s) SubCutaneous every 24 hours  melatonin 3 milliGRAM(s) Oral at bedtime  metoprolol tartrate 25 milliGRAM(s) Oral two times a day  multivitamin 1 Tablet(s) Oral daily  piperacillin/tazobactam IVPB.. 3.375 Gram(s) IV Intermittent every 8 hours  polyethylene glycol 3350 17 Gram(s) Oral daily  senna 2 Tablet(s) Oral at bedtime  sodium chloride 2 Gram(s) Oral three times a day         LABS:  CBC Full  -  ( 08 Aug 2022 00:06 )  WBC Count : 7.85 K/uL  RBC Count : 3.49 M/uL  Hemoglobin : 10.5 g/dL  Hematocrit : 32.0 %  Platelet Count - Automated : 143 K/uL  Mean Cell Volume : 91.7 fl  Mean Cell Hemoglobin : 30.1 pg  Mean Cell Hemoglobin Concentration : 32.8 gm/dL  Auto Neutrophil # : x  Auto Lymphocyte # : x  Auto Monocyte # : x  Auto Eosinophil # : x  Auto Basophil # : x  Auto Neutrophil % : x  Auto Lymphocyte % : x  Auto Monocyte % : x  Auto Eosinophil % : x  Auto Basophil % : x      08-08    142  |  107  |  13.6  ----------------------------<  156<H>  3.6   |  24.0  |  0.57    Ca    8.3<L>      08 Aug 2022 00:06  Phos  2.0     08-08  Mg     1.9     08-08        Hemoglobin A1C:       On Neurological Examination:    Mental Status - Patient is  awake  Verbalizing a few words. Followed occasional command. ( open and close your mouth)  Given choiced said yes to her name correctly..  Cranial Nerves - PERRL, EOM--  Left Gaze deviation has resolved and can look to her right as well.  Visual fields  Blinked to Threat on the left and not on right. Has a right central facial weakness.    Motor Exam -   Right upper extremity 0/5  R LE is 1/5.  Left upper  and lower moves briskly.    Sensory    percieves pain bilaterally.    Coord: FTN - not testable.  Gait -  Not assessed.        RADIOLOGY ( All neurological imaging studies were independently reviewed and interpreted by me)  CT   CTA  CTP      CT Head No Cont (08.05.22 @ 14:36) >    IMPRESSION: Acute left ADRYAN, left MCA, & right PCA territory infarctions.   No evidence of hemorrhagic transformation.    GITA TAMAYO MD; Attending Radiologist      MRI:  TTE    TTE Echo Complete w/ Contrast w/ Doppler (08.05.22 @ 09:45) >  Summary:   1. Technically difficult study.   2. Hyperdynamic global left ventricular systolic function.   3. Left ventricular ejection fraction, by visual estimation, is 70 to   75%.   4. Spectral Doppler shows pseudonormal pattern of left ventricular   myocardial filling (Grade II diastolic dysfunction).   5. Normal right ventricular size and function.   6. The left atrium is normal in size.   7. Mild to moderate aortic valve stenosis.   8. Moderate aortic regurgitation.   9. Mild thickening of the anterior and posterior mitral valve leaflets.  10. Mild mitral annular calcification.  11. Mild to moderate mitral valve regurgitation.  12. Moderate tricuspid regurgitation.  13. Estimated pulmonary artery systolic pressure is 43.4 mmHg assuming a   right atrial pressureof 3 mmHg, which is consistent with mild pulmonary   hypertension.  14. There is no evidence of pericardial effusion.    MD Nakia Electronically signed on 8/5/2022 at 1:51:31 PM     IR Neuro (08.04.22 @ 19:47) >    IMPRESSION:  Supervision and Interpretation:  1. Complete occlusion of the M1 segment of the left middle cerebral   artery at its origin.    2. Successful complete TICI3 revascularization of the left middle   cerebral artery utilizing direct contact aspiration.

## 2022-08-08 NOTE — CONSULT NOTE ADULT - NS ATTEND AMEND GEN_ALL_CORE FT
cerebrovascular accident : right hemiparesis. confused.  no indication for NAVIN. will not . frequwent PAcs. no definite afib.  Ep evaln for loop recorder to evaluate for atrial fibrillation .  aspirin statins.

## 2022-08-08 NOTE — SWALLOW BEDSIDE ASSESSMENT ADULT - ORAL PREPARATORY PHASE
+orientation to spoon, with initial adequate oral aperture, reduced stripping of utensil and subsequent biting of spoon. Significantly reduced bolus formation and absent A-P transfer. Bolus remained on lingual surface until removed by clinician. Pt with increasing difficulty sustaining arousal as eval progressed, further confounding oral preparatory phase of swallow.

## 2022-08-08 NOTE — PROGRESS NOTE ADULT - ASSESSMENT
88F remote hx breast CA, HTN, HLD transferred from [facility] for Acute Stroke (L MCA M1 occlusion s/p TPA and M.T. TICI 3 recanalization) s/p extubation on 8/5 ccb Acute Hypoxic Resp failure sec to aspiration pna started on IV zosyn on 8/7 and dysphagia/poor mental status requiring NGT placement. GOC assessed on ICU resulting ind ecision to make patietn DNR DNI now pending CVA work up.    Acute CVA s/p tpa s/p mechanical thrombectomy   CT with Acute left ADRYAN left MCA, & right PCA territory infarctions suspicious for cardioembolic source however no arrythmia identified thus far.   Downgrade to Stepdown  q2 neuro checks   Aspiration precautions  Na goal 140 - 145. Currently on Salt Tabs 2g TID  MRI non con brain pending  MRA neck (poor eval of carotids on CTA)  ASA 81, Atorvastatin 20   SBP goal 100-150  PT/OT/SLP  B/L LE doppler neg for DVT  Cardio consulted for Loop/NAVIN    Acute Hypoxic Respiratory Failure Secondary to Aspiration PNA   Cont Zosyn 8/7 -   ID Consulted in ICU  Currently on NC to wean as tolerated  Frequent suctioning and aspiration precautions    Dysphagia   Fails swallow eval. likely sec to cva and poor mental status   TF via NGT for now  Palliative consulted to discuss further GOC regarding feeds. PEG/APOLLO vs Pleasure feeds/Hospice    HTN  SBP goal 100-150  Cont metoprolol 25 mg BID  Hold home amlodipine 2.5 mg daily  Started on Cardura 8/8 for HTN/urinary retention.   If BP trend stable with cardura, consider aading home dose amlodipine in am    HFpEF  TTE: ef 70-75% Grade 2 diastolic dysfunction, moderate AS, mod AR, mild MR, mod TR, mild pulm HTN  Cont metoprolol  Cardiology consulted for NAIVN/ILR to investigate for LV thrombus/pfo and or afib    HLD  statin     Diet: TF goal 40  DVT ppx: Lovenox  Dispo: Transfer to [facility]     88F remote hx breast CA, HTN, HLD transferred from [facility] for Acute Stroke (L MCA M1 occlusion s/p TPA and M.T. TICI 3 recanalization) s/p extubation on 8/5 ccb Acute Hypoxic Resp failure sec to aspiration pna started on IV zosyn on 8/7 and dysphagia/poor mental status requiring NGT placement. GOC assessed on ICU resulting ind ecision to make patietn DNR DNI now pending CVA work up.    Acute CVA s/p tpa s/p mechanical thrombectomy   CT with Acute left ADRYAN left MCA, & right PCA territory infarctions suspicious for cardioembolic source however no arrythmia identified thus far.   Downgrade to Stepdown  q2 neuro checks   Aspiration precautions  Na goal 140 - 145. Currently on Salt Tabs 2g TID  MRI non con brain pending  MRA neck (poor eval of carotids on CTA)  ASA 81, Atorvastatin 20   SBP goal 100-150  PT/OT/SLP  B/L LE doppler neg for DVT  Cardio consulted for Loop/NAVIN    Acute Hypoxic Respiratory Failure Secondary to Aspiration PNA   Cont Zosyn 8/7 -   ID Consulted in ICU  Currently on NC to wean as tolerated  Frequent suctioning and aspiration precautions    Dysphagia   Fails swallow eval. likely sec to cva and poor mental status   TF via NGT for now  Palliative consulted to discuss further GOC regarding feeds. PEG/APOLLO vs Pleasure feeds/Hospice    HTN  SBP goal 100-150  Cont metoprolol 25 mg BID  Hold home amlodipine 2.5 mg daily  Started on Cardura 8/8 for HTN/urinary retention.   If BP trend stable with cardura, consider aading home dose amlodipine in am    HFpEF  TTE: ef 70-75% Grade 2 diastolic dysfunction, moderate AS, mod AR, mild MR, mod TR, mild pulm HTN  Cont metoprolol  Cardiology consulted for NAVIN/ILR to investigate for LV thrombus/pfo and or afib    HLD  statin     Diet: TF goal 40  DVT ppx: Lovenox  Dispo: Transfer to [facility]

## 2022-08-08 NOTE — SWALLOW BEDSIDE ASSESSMENT ADULT - SLP PERTINENT HISTORY OF CURRENT PROBLEM
87 yo woman with HTN, HLD and breast cancer in remission, mRS 0 at baseline, admitted 8/5 with R sided weakness and aphasia, NIHSS 23, s/p tPA. Initial CTA with a L supraclinoid carotid occlusion, with angiogram showing a L M1 occlusion s/p TICI 3 recanalization. Mechanism of stroke is ESUS (likely Afib in patient's age group) vs less likely cancer related hypercoagulability

## 2022-08-08 NOTE — PROGRESS NOTE ADULT - ASSESSMENT
ASSESSMENT  88F hx HTN, HLD and breast cancer in remission, mRS 0 at baseline, admitted 8/5 with R sided weakness and aphasia, NIHSS 23, found with L MCA m1 occlusion s/p tPA & M.T. TICI 3 recanalization now POD 4  Stroke Day #4    PLAN  - Discussed with Dr. Jimenez   - Q2 hour neuro checks   -HOB elevated 30 degrees, neck midline  - SBP goal 100-150 s/p thrombectomy. Continue home dose lopressor  -pending MRI evaluate strokes.  - TTE performed: EF 70-75%, GII DD, mod AS, mod AR, mild MR, mod TR, mild pulm HTN   -pending cardiology consult for stroke work up likely cardioembolic.   -stroke core measures A1C 5.6, LDL 72 continue statin  - DVT prophylaxis; SCDs, lovenox 40   -PT/OT/SLP as appropriate.   -failed swallow eval today. may require PEG tube  -Chest PT/ pulm toileting/ Incentive spirometry PRN  -Palliative care consult placed. GOC pending PEG tube.   - Further plan per medicine team

## 2022-08-08 NOTE — CONSULT NOTE ADULT - PROBLEM SELECTOR RECOMMENDATION 3
Lipid panel fasting  Continue Statins. Monitor LFTs  DASH diet      Further Lipid panel fasting  Continue Statins. Monitor LFTs  DASH diet    Further recommendations base on above findings   Case discussed with Dr Moscoso

## 2022-08-08 NOTE — CONSULT NOTE ADULT - SUBJECTIVE AND OBJECTIVE BOX
[facility]                                              CARDIOLOGY AT [address], [place] [address]                                             Telephone: [phone]. Fax:[phone]      CARDIOLOGY CONSULTATION NOTE                                                                                             History obtained by: Medical record  Community Cardiologist: Unknown   obtained: No   Reason for Consultation: Stroke/LRI    Chief complaint:   Patient is a 88y old  Female who presents with a chief complaint of Stroke (08 Aug 2022 16:17)    HPI: 87 yo F with PMHX of HTN, HLD and breast CA  presents to [place] as code stroke found to have L MCA occlusion. Last known well at 2:15pm, found around 330 pm to have aphasia and right sided weakness. Given TPA at 4:05pm and intubated for airway protection. Transferred to [facility] for mechanical thrombectomy, NIH 23, MRS 0 (04 Aug 2022 18:45)    CARDIAC TESTING     TTE Echo Complete w/ Contrast w/ Doppler (08.05.22 @ 09:45) >  Summary:   1. Technically difficult study.   2. Hyperdynamic global left ventricular systolic function.   3. Left ventricular ejection fraction, by visual estimation, is 70 to 75%.   4. Spectral Doppler shows pseudonormal pattern of left ventricular   myocardial filling (Grade II diastolic dysfunction).   5. Normal right ventricular size and function.   6. The left atrium is normal in size.   7. Mild to moderate aortic valve stenosis.   8. Moderate aortic regurgitation.   9. Mild thickening of the anterior and posterior mitral valve leaflets.  10. Mild mitral annular calcification.  11. Mild to moderate mitral valve regurgitation.  12. Moderate tricuspid regurgitation.  13. Estimated pulmonary artery systolic pressure is 43.4 mmHg assuming a   right atrial pressureof 3 mmHg, which is consistent with mild pulmonary   hypertension.  14. There is no evidence of pericardial effusion.  MD Nakia Electronically signed on 8/5/2022 at 1:51:31 PM  < end of copied text >    NM Pharm Stress Test, Dual Isotope (11.17.17 @ 14:07) >  IMPRESSION: Normal myocardial perfusion scan.  1. No scintigraphic evidence for myocardial infarct or ischemia.  2. There is normal left ventricular contractility, normal calculated   ejection fraction and normal myocardial thickening at rest. Overall post   stress ejection fraction was 73 %   3. Please see the cardiac test report for EKG findings and symptoms   during the procedure  < end of copied text >    PAST MEDICAL HISTORY  HTN (hypertension)  Hypercholesteremia  Breast cancer    PAST SURGICAL HISTORY  No significant past surgical history    SOCIAL HISTORY: Unknown smoking/alcohol/drugs    FAMILY HISTORY:  Unknown    Family History of Cardiovascular Disease:  Yes [  ] No [  ]  Coronary Artery Disease in first degree relative: Yes [  ] No [  ]  Sudden Cardiac Death in First degree relative: Yes [  ] No [  ]    HOME MEDICATIONS:   amLODIPine 2.5 mg oral tablet: 1 tab(s) orally once a day (05 Aug 2022 07:53)  Metoprolol Succinate ER 50 mg oral tablet, extended release: 1 tab(s) orally once a day (05 Aug 2022 07:53)    CURRENT CARDIAC MEDICATIONS:   doxazosin 2 milliGRAM(s) Oral at bedtime  metoprolol tartrate 25 milliGRAM(s) Oral two times a day    CURRENT OTHER MEDICATIONS:   ALBUTerol    90 MICROgram(s) HFA Inhaler 2 Puff(s) Inhalation every 6 hours PRN Bronchospasm  albuterol/ipratropium for Nebulization 3 milliLiter(s) Nebulizer every 6 hours PRN Shortness of Breath and/or Wheezing  acetaminophen    Suspension .. 650 milliGRAM(s) Oral every 6 hours PRN Temp greater or equal to 38C (100.4F), Mild Pain (1 - 3)  melatonin 3 milliGRAM(s) Oral at bedtime  polyethylene glycol 3350 17 Gram(s) Oral daily  senna 2 Tablet(s) Oral at bedtime  AQUAPHOR (petrolatum Ointment) 1 Application(s) Topical two times a day  aspirin  chewable 81 milliGRAM(s) Oral daily  atorvastatin 20 milliGRAM(s) Oral at bedtime  chlorhexidine 2% Cloths 1 Application(s) Topical daily  enoxaparin Injectable 40 milliGRAM(s) SubCutaneous every 24 hours  multivitamin 1 Tablet(s) Oral daily  piperacillin/tazobactam IVPB.. 3.375 Gram(s) IV Intermittent every 8 hours, Stop order after: 7 Days  sodium chloride 2 Gram(s) Oral three times a day    ALLERGIES:   No Known Allergies    REVIEW OF SYMPTOMS: Unable to perform. Pt confused however denies any pain    VITAL SIGNS:   T(C): 37.2 (08-08-22 @ 20:00), Max: 38.3 (08-08-22 @ 11:00)  T(F): 98.9 (08-08-22 @ 20:00), Max: 100.9 (08-08-22 @ 11:00)  HR: 75 (08-08-22 @ 20:00) (69 - 166)  BP: 124/72 (08-08-22 @ 20:00) (104/63 - 149/55)  RR: 22 (08-08-22 @ 20:00) (13 - 26)  SpO2: 95% (08-08-22 @ 20:00) (95% - 99%)    INTAKE AND OUTPUT:      08-07 @ 07:01  -  08-08 @ 07:00  --------------------------------------------------------  IN: 1814.8 mL / OUT: 500 mL / NET: 1314.8 mL    08-08 @ 07:01  -  08-08 @ 22:51  --------------------------------------------------------  IN: 805 mL / OUT: 170 mL / NET: 635 mL    PHYSICAL EXAM:   Constitutional: Comfortable . No acute distress.   HEENT: Atraumatic and normocephalic , neck is supple . no JVD.  CNS: + Confused.   Respiratory: CTAB, unlabored. No wheezing, No crackles or rhonchi   Cardiovascular: + murmurs 2 ICS to R and L of sternum. normal s1 s2. No rubs or gallop.  Gastrointestinal: Soft, non-tender. +Bowel sounds.   Extremities: 2+ Peripheral Pulses, + Trace edema b/l  Psychiatric: Calm . no agitation.   Skin: Warm and dry    LABS:   ( 08 Aug 2022 00:06 )  Troponin T  0.02 ,  CPK  X    , CKMB  X    , BNP X                            10.5   7.85  )-----------( 143      ( 08 Aug 2022 00:06 )             32.0     08-08    142  |  107  |  13.6  ----------------------------<  156<H>  3.6   |  24.0  |  0.57    Ca    8.3<L>      08 Aug 2022 00:06  Phos  2.0     08-08  Mg     1.9     08-08      ECG:   Prior ECG: Yes [  ] No [  ]    RADIOLOGY & ADDITIONAL STUDIES:     US Duplex Venous Lower Ext Complete, Bilateral (08.06.22 @ 10:33) >  IMPRESSION:  No evidence of deep venous thrombosis in either lower extremity.  < end of copied text >    CT Head No Cont (08.05.22 @ 14:36) >  IMPRESSION: Acute left ADRYAN, left MCA, & right PCA territory infarctions.   No evidence of hemorrhagic transformation.  < end of copied text >    IR Neuro (08.04.22 @ 19:47) >  IMPRESSION:  Supervision and Interpretation:  1. Complete occlusion of the M1 segment of the left middle cerebral   artery at its origin.  2. Successful complete TICI3 revascularization of the left middle   cerebral artery utilizing direct contact aspiration.  < end of copied text >      Preliminary evaluation, please await official recommendations     Assessment and recommendations are final when note is signed by the attending.                                      [facility]                                              CARDIOLOGY AT [address], [place] [address]                                             Telephone: [phone]. Fax:[phone]      CARDIOLOGY CONSULTATION NOTE                                                                                             History obtained by: Medical record  Community Cardiologist: Unknown   obtained: No   Reason for Consultation: Stroke/LRI    Chief complaint:   Patient is a 88y old  Female who presents with a chief complaint of Stroke (08 Aug 2022 16:17)    HPI: 87 yo F with PMHX of HTN, HLD and breast CA  presents to [place] as code stroke found to have L MCA occlusion. Last known well at 2:15pm, found around 330 pm to have aphasia and right sided weakness. Given TPA at 4:05pm and intubated for airway protection. Transferred to [facility] for mechanical thrombectomy, NIH 23, MRS 0 (04 Aug 2022 18:45)    CARDIAC TESTING     TTE Echo Complete w/ Contrast w/ Doppler (08.05.22 @ 09:45) >  Summary:   1. Technically difficult study.   2. Hyperdynamic global left ventricular systolic function.   3. Left ventricular ejection fraction, by visual estimation, is 70 to 75%.   4. Spectral Doppler shows pseudonormal pattern of left ventricular   myocardial filling (Grade II diastolic dysfunction).   5. Normal right ventricular size and function.   6. The left atrium is normal in size.   7. Mild to moderate aortic valve stenosis.   8. Moderate aortic regurgitation.   9. Mild thickening of the anterior and posterior mitral valve leaflets.  10. Mild mitral annular calcification.  11. Mild to moderate mitral valve regurgitation.  12. Moderate tricuspid regurgitation.  13. Estimated pulmonary artery systolic pressure is 43.4 mmHg assuming a   right atrial pressureof 3 mmHg, which is consistent with mild pulmonary   hypertension.  14. There is no evidence of pericardial effusion.  MD Nakia Electronically signed on 8/5/2022 at 1:51:31 PM  < end of copied text >    NM Pharm Stress Test, Dual Isotope (11.17.17 @ 14:07) >  IMPRESSION: Normal myocardial perfusion scan.  1. No scintigraphic evidence for myocardial infarct or ischemia.  2. There is normal left ventricular contractility, normal calculated   ejection fraction and normal myocardial thickening at rest. Overall post   stress ejection fraction was 73 %   3. Please see the cardiac test report for EKG findings and symptoms   during the procedure  < end of copied text >    PAST MEDICAL HISTORY  HTN (hypertension)  Hypercholesteremia  Breast cancer    PAST SURGICAL HISTORY  No significant past surgical history    SOCIAL HISTORY: Unknown smoking/alcohol/drugs    FAMILY HISTORY:  Unknown    Family History of Cardiovascular Disease:  Yes [  ] No [  ]  Coronary Artery Disease in first degree relative: Yes [  ] No [  ]  Sudden Cardiac Death in First degree relative: Yes [  ] No [  ]    HOME MEDICATIONS:   amLODIPine 2.5 mg oral tablet: 1 tab(s) orally once a day (05 Aug 2022 07:53)  Metoprolol Succinate ER 50 mg oral tablet, extended release: 1 tab(s) orally once a day (05 Aug 2022 07:53)    CURRENT CARDIAC MEDICATIONS:   doxazosin 2 milliGRAM(s) Oral at bedtime  metoprolol tartrate 25 milliGRAM(s) Oral two times a day    CURRENT OTHER MEDICATIONS:   ALBUTerol    90 MICROgram(s) HFA Inhaler 2 Puff(s) Inhalation every 6 hours PRN Bronchospasm  albuterol/ipratropium for Nebulization 3 milliLiter(s) Nebulizer every 6 hours PRN Shortness of Breath and/or Wheezing  acetaminophen    Suspension .. 650 milliGRAM(s) Oral every 6 hours PRN Temp greater or equal to 38C (100.4F), Mild Pain (1 - 3)  melatonin 3 milliGRAM(s) Oral at bedtime  polyethylene glycol 3350 17 Gram(s) Oral daily  senna 2 Tablet(s) Oral at bedtime  AQUAPHOR (petrolatum Ointment) 1 Application(s) Topical two times a day  aspirin  chewable 81 milliGRAM(s) Oral daily  atorvastatin 20 milliGRAM(s) Oral at bedtime  chlorhexidine 2% Cloths 1 Application(s) Topical daily  enoxaparin Injectable 40 milliGRAM(s) SubCutaneous every 24 hours  multivitamin 1 Tablet(s) Oral daily  piperacillin/tazobactam IVPB.. 3.375 Gram(s) IV Intermittent every 8 hours, Stop order after: 7 Days  sodium chloride 2 Gram(s) Oral three times a day    ALLERGIES:   No Known Allergies    REVIEW OF SYMPTOMS: Unable to perform. Pt confused however denies any pain    VITAL SIGNS:   T(C): 37.2 (08-08-22 @ 20:00), Max: 38.3 (08-08-22 @ 11:00)  T(F): 98.9 (08-08-22 @ 20:00), Max: 100.9 (08-08-22 @ 11:00)  HR: 75 (08-08-22 @ 20:00) (69 - 166)  BP: 124/72 (08-08-22 @ 20:00) (104/63 - 149/55)  RR: 22 (08-08-22 @ 20:00) (13 - 26)  SpO2: 95% (08-08-22 @ 20:00) (95% - 99%)    INTAKE AND OUTPUT:      08-07 @ 07:01  -  08-08 @ 07:00  --------------------------------------------------------  IN: 1814.8 mL / OUT: 500 mL / NET: 1314.8 mL    08-08 @ 07:01  -  08-08 @ 22:51  --------------------------------------------------------  IN: 805 mL / OUT: 170 mL / NET: 635 mL    PHYSICAL EXAM:   Constitutional: Comfortable . No acute distress.   HEENT: Atraumatic and normocephalic , neck is supple . no JVD.  CNS: + Confused.   Respiratory: CTAB, unlabored. No wheezing, No crackles or rhonchi   Cardiovascular: + murmurs 2 ICS to R and L of sternum. normal s1 s2. No rubs or gallop.  Gastrointestinal: Soft, non-tender. +Bowel sounds.   Extremities: 2+ Peripheral Pulses, + Trace edema b/l  Psychiatric: Calm . no agitation.   Skin: Warm and dry    LABS:   ( 08 Aug 2022 00:06 )  Troponin T  0.02 ,  CPK  X    , CKMB  X    , BNP X                            10.5   7.85  )-----------( 143      ( 08 Aug 2022 00:06 )             32.0     08-08    142  |  107  |  13.6  ----------------------------<  156<H>  3.6   |  24.0  |  0.57    Ca    8.3<L>      08 Aug 2022 00:06  Phos  2.0     08-08  Mg     1.9     08-08      ECG: Sinus arrythmia. Non ischemic   Prior ECG: Yes    RADIOLOGY & ADDITIONAL STUDIES:     Chest x-ray: Unremarkable. Final reading/report pending.    US Duplex Venous Lower Ext Complete, Bilateral (08.06.22 @ 10:33) >  IMPRESSION:  No evidence of deep venous thrombosis in either lower extremity.  < end of copied text >    CT Head No Cont (08.05.22 @ 14:36) >  IMPRESSION: Acute left ADRYAN, left MCA, & right PCA territory infarctions.   No evidence of hemorrhagic transformation.  < end of copied text >    IR Neuro (08.04.22 @ 19:47) >  IMPRESSION:  Supervision and Interpretation:  1. Complete occlusion of the M1 segment of the left middle cerebral   artery at its origin.  2. Successful complete TICI3 revascularization of the left middle   cerebral artery utilizing direct contact aspiration.  < end of copied text >      Preliminary evaluation, please await official recommendations     Assessment and recommendations are final when note is signed by the attending.

## 2022-08-08 NOTE — PROGRESS NOTE ADULT - SUBJECTIVE AND OBJECTIVE BOX
HPI:   89 yo woman with HTN, HLD and breast cancer in remission, mRS 0 at baseline, admitted 8/5 with R sided weakness and aphasia, NIHSS 23, s/p tPA. Initial CTA with a L supraclinoid carotid occlusion, with angiogram showing a L M1 occlusion s/p TICI 3 recanalization.   NSCU intubated 2/2 low TV.   Extubated 8/5/22    ICU Vital Signs Last 24 Hrs  T(C): 37.6 (08 Aug 2022 06:00), Max: 38 (07 Aug 2022 12:00)  T(F): 99.7 (08 Aug 2022 06:00), Max: 100.4 (07 Aug 2022 12:00)  HR: 93 (08 Aug 2022 06:00) (68 - 166)  BP: 126/86 (08 Aug 2022 06:00) (107/57 - 154/133)  BP(mean): 97 (08 Aug 2022 06:00) (70 - 142)  RR: 22 (08 Aug 2022 06:00) (12 - 26)  SpO2: 98% (08 Aug 2022 06:00) (96% - 100%)    O2 Parameters below as of 08 Aug 2022 06:00  Patient On (Oxygen Delivery Method): room air        07 Aug 2022 07:01  -  08 Aug 2022 07:00  --------------------------------------------------------  IN:    Enteral Tube Flush: 230 mL    IV PiggyBack: 499.8 mL    IV PiggyBack: 100 mL    IV PiggyBack: 275 mL    IV PiggyBack: 100 mL    IV PiggyBack: 250 mL    Jevity 1.5: 360 mL  Total IN: 1814.8 mL    OUT:    Indwelling Catheter - Urethral (mL): 500 mL  Total OUT: 500 mL    Total NET: 1314.8 mL    MEDICATIONS  (STANDING):  albuterol/ipratropium for Nebulization 3 milliLiter(s) Nebulizer every 6 hours  aspirin  chewable 81 milliGRAM(s) Oral daily  atorvastatin 20 milliGRAM(s) Oral at bedtime  chlorhexidine 2% Cloths 1 Application(s) Topical daily  enoxaparin Injectable 40 milliGRAM(s) SubCutaneous every 24 hours  melatonin 3 milliGRAM(s) Oral at bedtime  metoprolol tartrate 25 milliGRAM(s) Oral two times a day  multivitamin 1 Tablet(s) Oral daily  piperacillin/tazobactam IVPB.. 3.375 Gram(s) IV Intermittent every 8 hours  polyethylene glycol 3350 17 Gram(s) Oral daily  potassium chloride  10 mEq/100 mL IVPB 10 milliEquivalent(s) IV Intermittent every 1 hour  senna 2 Tablet(s) Oral at bedtime  sodium chloride 2 Gram(s) Oral three times a day    MEDICATIONS  (PRN):  acetaminophen    Suspension .. 650 milliGRAM(s) Oral every 6 hours PRN Temp greater or equal to 38C (100.4F), Mild Pain (1 - 3)  ALBUTerol    90 MICROgram(s) HFA Inhaler 2 Puff(s) Inhalation every 6 hours PRN Bronchospasm    08-08    142  |  107  |  13.6  ----------------------------<  156<H>  3.6   |  24.0  |  0.57    Ca    8.3<L>      08 Aug 2022 00:06  Phos  2.0     08-08  Mg     1.9     08-08    TPro  5.8<L>  /  Alb  3.4  /  TBili  1.6  /  DBili  0.3  /  AST  21  /  ALT  10  /  AlkPhos  45  08-06                          10.5   7.85  )-----------( 143      ( 08 Aug 2022 00:06 )             32.0      CT Head No Cont (08.05.22 @ 14:36) >  FINDINGS:  Evolving acute left MCA territory infarct involving the left frontal   lobe, left basal ganglia. Acute anterior left temporal lobe and acute   right occipital lobe infarction. Acute anterior inferior left frontal   lobe infarction. No evidence of hemorrhagic transformation. Consider MRI   for further evaluation.    Status post left ocular lens replacement. The orbits, mastoid air cells   and visualized paranasal sinuses are otherwise unremarkable. The   calvarium is intact.Consider MRI as clinically warranted.    IMPRESSION: Acute left ADRYAN, left MCA, & right PCA territory infarctions.   No evidence of hemorrhagic transformation.    < end of copied text >       US Duplex Venous Lower Ext Complete, Bilateral (08.06.22 @ 10:33) >  INTERPRETATION:  CLINICAL INFORMATION: Stroke. Fever. Evaluate for DVT.    COMPARISON: None available.    TECHNIQUE: Duplex sonographyof the BILATERAL LOWER extremity veins with   color and spectral Doppler, with and without compression.    FINDINGS:    RIGHT:  Normal compressibility of the RIGHT common femoral, femoral and popliteal   veins.  Doppler examination shows normal spontaneous and phasic flow.  No RIGHT calf vein thrombosis is detected.    LEFT:  Normal compressibility of the LEFT common femoral, femoral and popliteal   veins.  Doppler examination shows normal spontaneous and phasic flow.  No LEFT calf vein thrombosis is detected.    IMPRESSION:  No evidence of deep venous thrombosis in either lower extremity.    < end of copied text >       TTE Echo Complete w/ Contrast w/ Doppler (08.05.22 @ 09:45) >   1. Technically difficult study.   2. Hyperdynamic global left ventricular systolic function.   3. Left ventricular ejection fraction, by visual estimation, is 70 to   75%.   4. Spectral Doppler shows pseudonormal pattern of left ventricular   myocardial filling (Grade II diastolic dysfunction).   5. Normal right ventricular size and function.   6. The left atrium is normal in size.   7. Mild to moderate aortic valve stenosis.   8. Moderate aortic regurgitation.   9. Mild thickening of the anterior and posterior mitral valve leaflets.  10. Mild mitral annular calcification.  11. Mild to moderate mitral valve regurgitation.  12. Moderate tricuspid regurgitation.  13. Estimated pulmonary artery systolic pressure is 43.4 mmHg assuming a   right atrial pressureof 3 mmHg, which is consistent with mild pulmonary   hypertension.  14. There is no evidence of pericardial effusion.    < end of copied text >       CT Head No Cont (08.05.22 @ 14:36) >  INTERPRETATION:  CLINICAL INDICATIONS:  cva    COMPARISON: CT head dated 8/4/2022    TECHNIQUE: Noncontrast CT of the head. Multiplanar reformations are   submitted.    FINDINGS:  Evolving acute left MCA territory infarct involving the left frontal   lobe, left basal ganglia. Acute anterior left temporal lobe and acute   right occipital lobe infarction. Acute anterior inferior left frontal   lobe infarction. No evidence of hemorrhagic transformation. Consider MRI   for further evaluation.    Status post left ocular lens replacement. The orbits, mastoid air cells   and visualized paranasal sinuses are otherwise unremarkable. The   calvarium is intact.Consider MRI as clinically warranted.    IMPRESSION: Acute left ADRYAN, left MCA, & right PCA territory infarctions.   No evidence of hemorrhagic transformation.       US Duplex Venous Lower Ext Complete, Bilateral (11.18.17 @ 09:29) >  INTERPRETATION:  CLINICAL INFORMATION: Leg swelling    COMPARISON: None available.    TECHNIQUE: Duplex sonography of the BILATERAL LOWER extremities with   color and spectral Doppler, with and without compression.      FINDINGS:    There is normal compressibility of the bilateral common femoral, femoral   and popliteal veins. No calf vein thrombosis is detected.    Doppler examination shows normal spontaneous and phasic flow.    IMPRESSION:     No evidence of bilateral lower extremity deep venous thrombosis.        EXAMINATION:  General:  in NAD  HEENT:  intubated  Neuro:  opens eyes to voice, attends, does not follows commands, with L gaze preference, L side AG, R arm min withdrawal , R leg 2/5 flexion to noxious   Cards:  2/6 systolic murmur LLSB   Respiratory:  no respiratory distress, upper airway guttural sounds  Abdomen:  soft  Extremities:  no LE edema

## 2022-08-08 NOTE — SWALLOW BEDSIDE ASSESSMENT ADULT - SWALLOW EVAL: DIAGNOSIS
Severe oral dysphagia confounded by lethargy. Unable to assess pharyngeal stage of swallow 2* no swallow triggered

## 2022-08-08 NOTE — CONSULT NOTE ADULT - ASSESSMENT
87 yo F with PMHX of HTN, HLD and breast CA  presents to [facility] as code stroke found to have L MCA occlusion. Last known well at 2:15pm, found around 330 pm to have aphasia and right sided weakness. Given TPA at 4:05pm and intubated for airway protection. Transferred to [facility] for mechanical thrombectomy, NIH 23, MRS 0 (04 Aug 2022 18:45)  89 yo F w presents from [facility] as code stroke found to have L MCA occlusion.

## 2022-08-08 NOTE — SWALLOW BEDSIDE ASSESSMENT ADULT - ASR SWALLOW ASPIRATION MONITOR
change of breathing pattern/oral hygiene/position upright (89Y)/cough/gurgly voice/pneumonia/throat clearing/upper respiratory infection

## 2022-08-08 NOTE — PROGRESS NOTE ADULT - ASSESSMENT
Assessment/Plan:   89 yo woman with HTN, HLD and breast cancer in remission, mRS 0 at baseline, admitted 8/5 with R sided weakness and aphasia, NIHSS 23, s/p tPA. Initial CTA with a L supraclinoid carotid occlusion, with angiogram showing a L M1 occlusion s/p TICI 3 recanalization. Mechanism of stroke is ESUS (likely Afib in patient's age group) vs less likely cancer related hypercoagulability   LDL 72, HgA1C 5.7.    Neuro:   - q1 neuro checks   - post thrombectomy care / post tpa care   - CT - l parietal infarct - cytoxic edema   - Na - goal 140 < 145  - MRI non con brain, MRA neck (poor eval of carotids on CTA)  - ASA ; atorvastatin 20 mg daily   - EKG    Cards: HTN ; Stage 2 Diastolic Dysfunction  - Card consult for Loop recorder   - Maintain monitoring for arrythmia  - -< 150  - metoprolol 25 mg BID (home med)  - hold home amlodipine 2.5 mg daily  - confirmed  home meds     Resp: Asp risk/ Resolving pul infiltrate  - Pul toilet  - RML infiltrate- Pip/Tazo x7 days- day 1/7   - MRSA nasal swab - neg  - extubated 8/5/22  - Mouth care and pul toilet   -Pul edema - improving  -Lasix 20mg X1 IV- 8/6/22  - Maintain O2 Sats > 92 %       GI:  - Jevity - advance feeds   - Dysphagia fail  - stool softeners  - D/C protonix     :   - Bladder scan after F/U output with lasix    End   LDL- 72  HGBA1C- 5.7  TSH - 2.41       ID: Fever  - ? Microaspiration   - send sputum  - MRSA nasal neg   - UA- neg   - No active issues  - No evidence of thrombophlebitis    Heme:  Chronic Thrombocytopenia  - Will monitor- stable    - Baseline  ---> 132  - SCDs  - lovenox - 40mg q day SQ   - Doppler B/L LE - neg   Assessment/Plan:   87 yo woman with HTN, HLD and breast cancer in remission, mRS 0 at baseline, admitted 8/5 with R sided weakness and aphasia, NIHSS 23, s/p tPA. Initial CTA with a L supraclinoid carotid occlusion, with angiogram showing a L M1 occlusion s/p TICI 3 recanalization. Mechanism of stroke is ESUS (likely Afib in patient's age group) vs less likely cancer related hypercoagulability   LDL 72, HgA1C 5.7.  PTD #4 - S/P L MI occlusion - S/P thrombectomy    Neuro:   - q2 neuro checks   - CT - l parietal infarct - cytoxic edema   - Na - goal 140 - 145  - MRI non con brain, MRA neck (poor eval of carotids on CTA)  - ASA ; atorvastatin 20 mg daily   - EKG    Cards: HTN ; Stage 2 Diastolic Dysfunction  - Card consult for Loop recorder   - Maintain monitoring for arrythmia  - -< 150  - metoprolol 25 mg BID )  - hold home amlodipine 2.5 mg daily      Resp: Asp risk/ Resolving pul infiltrate  - Pul toilet  - RML infiltrate- Pip/Tazo x7 days- day 2/7   - MRSA nasal swab - neg  - extubated 8/5/22  - Mouth care and pul toilet   - Maintain O2 Sats > 92 %       GI:  - Jevity - advance feeds   - Failed S/S   - Dysphagia fail  - stool softeners- Last BM - large 8/7/22  - D/C protonix     :   - Urinary retention - chamorro  - Free H2O- 200 q8    Cardura 2mg qhs     End   LDL- 72  HGBA1C- 5.7  TSH - 2.41       ID: Fever- R LL ASP Pneumonia vs pneumonitis  - Day # 2/7 PIP{/Tazo  - ? Microaspiration   - sputum pending  - MRSA nasal neg   - UA- neg s  - No evidence of thrombophlebitis    Heme:  Chronic Thrombocytopenia  - Will monitor- stable    - Baseline  ---> 143  - SCDs  - lovenox - 40mg q day SQ   - Doppler B/L LE - neg

## 2022-08-08 NOTE — PROGRESS NOTE ADULT - SUBJECTIVE AND OBJECTIVE BOX
[facility] Division of Hospital Medicine    Chief Complaint:    Acute CVA    SUBJECTIVE / OVERNIGHT EVENTS:  Hospital Course  88F remote hx breast CA, HTN, HLD transferred from [facility] CODE STROKE L MCA M1 occlusion s/p TPA and M.T. TICI 3 recanalization post stroke day 4. 24 hour CT performed revealing Acute left ADRYAN, left MCA, & right PCA territory infarctions- likely cardio-embolic source.  Patient extubated on 8/5. Post extubation patient with tenuous respiratory status requiring diuresis with 20mg IV lasix, duonebs and frequent NT suctioning.  CXR concerning for development of a RLL infiltrate and IV zosyn was started on 8/7. sputum cx + gpc in pairs and gram negative rods. NGT placed for poor mental status and failed swallow evaluation on 8/8. GOC discussion between Dr. Munroe and Daughter Nikole Meneses (), patient is now DNR/DNI. Palliative care consult placed - pending PEG discussion. Cardiology consulted for Stroke work up and possible ILR.       Patient denies chest pain, SOB, abd pain, N/V, fever, chills, dysuria or any other complaints. All remainder ROS negative.     MEDICATIONS  (STANDING):  AQUAPHOR (petrolatum Ointment) 1 Application(s) Topical two times a day  aspirin  chewable 81 milliGRAM(s) Oral daily  atorvastatin 20 milliGRAM(s) Oral at bedtime  chlorhexidine 2% Cloths 1 Application(s) Topical daily  doxazosin 2 milliGRAM(s) Oral at bedtime  enoxaparin Injectable 40 milliGRAM(s) SubCutaneous every 24 hours  melatonin 3 milliGRAM(s) Oral at bedtime  metoprolol tartrate 25 milliGRAM(s) Oral two times a day  multivitamin 1 Tablet(s) Oral daily  piperacillin/tazobactam IVPB.. 3.375 Gram(s) IV Intermittent every 8 hours  polyethylene glycol 3350 17 Gram(s) Oral daily  senna 2 Tablet(s) Oral at bedtime  sodium chloride 2 Gram(s) Oral three times a day    MEDICATIONS  (PRN):  acetaminophen    Suspension .. 650 milliGRAM(s) Oral every 6 hours PRN Temp greater or equal to 38C (100.4F), Mild Pain (1 - 3)  ALBUTerol    90 MICROgram(s) HFA Inhaler 2 Puff(s) Inhalation every 6 hours PRN Bronchospasm  albuterol/ipratropium for Nebulization 3 milliLiter(s) Nebulizer every 6 hours PRN Shortness of Breath and/or Wheezing        I&O's Summary    07 Aug 2022 07:01  -  08 Aug 2022 07:00  --------------------------------------------------------  IN: 1814.8 mL / OUT: 500 mL / NET: 1314.8 mL    08 Aug 2022 07:01  -  08 Aug 2022 13:26  --------------------------------------------------------  IN: 650 mL / OUT: 120 mL / NET: 530 mL        PHYSICAL EXAM:  Vital Signs Last 24 Hrs  T(C): 36.9 (08 Aug 2022 12:00), Max: 38.3 (08 Aug 2022 11:00)  T(F): 98.4 (08 Aug 2022 12:00), Max: 100.9 (08 Aug 2022 11:00)  HR: 77 (08 Aug 2022 12:00) (68 - 166)  BP: 145/51 (08 Aug 2022 12:00) (107/57 - 154/133)  BP(mean): 80 (08 Aug 2022 12:00) (70 - 142)  RR: 20 (08 Aug 2022 12:00) (13 - 26)  SpO2: 95% (08 Aug 2022 12:00) (95% - 100%)    Parameters below as of 08 Aug 2022 11:00  Patient On (Oxygen Delivery Method): room air            CONSTITUTIONAL: NAD, well-developed  ENMT: Moist oral mucosa, no pharyngeal injection or exudates; normal dentition  RESPIRATORY: Normal respiratory effort; lungs are clear to auscultation bilaterally  CARDIOVASCULAR: Regular rate and rhythm, normal S1 and S2, no murmur/rub/gallop; Peripheral pulses are 2+ bilaterally  ABDOMEN: Nontender to palpation, normoactive bowel sounds, no rebound/guarding;   MUSCLOSKELETAL:  No clubbing or cyanosis of digits; no joint swelling or tenderness to palpation  PSYCH: A+O to person, place, and time; affect appropriate  NEUROLOGY: CN 2-12 are intact and symmetric; no gross sensory deficits;   SKIN: No rashes; no palpable lesions    LABS:                        10.5   7.85  )-----------( 143      ( 08 Aug 2022 00:06 )             32.0     08-08    142  |  107  |  13.6  ----------------------------<  156<H>  3.6   |  24.0  |  0.57    Ca    8.3<L>      08 Aug 2022 00:06  Phos  2.0     08-08  Mg     1.9     08-08        CARDIAC MARKERS ( 08 Aug 2022 00:06 )  x     / 0.02 ng/mL / x     / x     / x              Culture - Sputum (collected 07 Aug 2022 11:58)  Source: .Sputum Sputum  Gram Stain (07 Aug 2022 23:18):    Numerous polymorphonuclear leukocytes per low power field    Few Squamous epithelial cells per low power field    Few Gram positive cocci in pairs per oil power field    Rare Gram Variable Rods per oil power field      CAPILLARY BLOOD GLUCOSE            RADIOLOGY & ADDITIONAL TESTS:  Results Reviewed:   Imaging Personally Reviewed:  Electrocardiogram Personally Reviewed:                                           [facility] Division of Hospital Medicine    Chief Complaint:    Acute CVA    SUBJECTIVE / OVERNIGHT EVENTS:  Hospital Course  88F remote hx breast CA, HTN, HLD transferred from [facility] CODE STROKE L MCA M1 occlusion s/p TPA and M.T. TICI 3 recanalization post stroke day 4. 24 hour CT performed revealing Acute left ADRYAN, left MCA, & right PCA territory infarctions- likely cardio-embolic source.  Patient extubated on 8/5. Post extubation patient with tenuous respiratory status requiring diuresis with 20mg IV lasix, duonebs and frequent NT suctioning.  CXR concerning for development of a RLL infiltrate and IV zosyn was started on 8/7. sputum cx + gpc in pairs and gram negative rods. NGT placed for poor mental status and failed swallow evaluation on 8/8. GOC discussion between Dr. Munroe and Daughter Nikole Meneses (), patient is now DNR/DNI. Palliative care consult placed - pending PEG discussion. Cardiology consulted for Stroke work up and possible ILR.       Patient is alert during encounter and intermittently answers questions with frequent redirection  Patient denies chest pain, SOB, abd pain, N/V, fever, chills, dysuria or any other complaints. All remainder ROS negative.           MEDICATIONS  (STANDING):  AQUAPHOR (petrolatum Ointment) 1 Application(s) Topical two times a day  aspirin  chewable 81 milliGRAM(s) Oral daily  atorvastatin 20 milliGRAM(s) Oral at bedtime  chlorhexidine 2% Cloths 1 Application(s) Topical daily  doxazosin 2 milliGRAM(s) Oral at bedtime  enoxaparin Injectable 40 milliGRAM(s) SubCutaneous every 24 hours  melatonin 3 milliGRAM(s) Oral at bedtime  metoprolol tartrate 25 milliGRAM(s) Oral two times a day  multivitamin 1 Tablet(s) Oral daily  piperacillin/tazobactam IVPB.. 3.375 Gram(s) IV Intermittent every 8 hours  polyethylene glycol 3350 17 Gram(s) Oral daily  senna 2 Tablet(s) Oral at bedtime  sodium chloride 2 Gram(s) Oral three times a day    Allergies and Intolerances:        Allergies:  	No Known Allergies:     PShx: unk  Social Hx: unk   Family History unk:    MEDICATIONS  (PRN):  acetaminophen    Suspension .. 650 milliGRAM(s) Oral every 6 hours PRN Temp greater or equal to 38C (100.4F), Mild Pain (1 - 3)  ALBUTerol    90 MICROgram(s) HFA Inhaler 2 Puff(s) Inhalation every 6 hours PRN Bronchospasm  albuterol/ipratropium for Nebulization 3 milliLiter(s) Nebulizer every 6 hours PRN Shortness of Breath and/or Wheezing        I&O's Summary    07 Aug 2022 07:01  -  08 Aug 2022 07:00  --------------------------------------------------------  IN: 1814.8 mL / OUT: 500 mL / NET: 1314.8 mL    08 Aug 2022 07:01  -  08 Aug 2022 13:26  --------------------------------------------------------  IN: 650 mL / OUT: 120 mL / NET: 530 mL        PHYSICAL EXAM:  Vital Signs Last 24 Hrs  T(C): 36.9 (08 Aug 2022 12:00), Max: 38.3 (08 Aug 2022 11:00)  T(F): 98.4 (08 Aug 2022 12:00), Max: 100.9 (08 Aug 2022 11:00)  HR: 77 (08 Aug 2022 12:00) (68 - 166)  BP: 145/51 (08 Aug 2022 12:00) (107/57 - 154/133)  BP(mean): 80 (08 Aug 2022 12:00) (70 - 142)  RR: 20 (08 Aug 2022 12:00) (13 - 26)  SpO2: 95% (08 Aug 2022 12:00) (95% - 100%)    Parameters below as of 08 Aug 2022 11:00  Patient On (Oxygen Delivery Method): room air            CONSTITUTIONAL: NAD, well-developed  ENMT: Moist oral mucosa, no pharyngeal injection or exudates; normal dentition  RESPIRATORY: Normal respiratory effort; lungs are clear to auscultation bilaterally  CARDIOVASCULAR: Regular rate and rhythm, normal S1 and S2, no murmur/rub/gallop; Peripheral pulses are 2+ bilaterally  ABDOMEN: Nontender to palpation, normoactive bowel sounds, no rebound/guarding;   MUSCLOSKELETAL:  No clubbing or cyanosis of digits; no joint swelling or tenderness to palpation  PSYCH: A+O to person, affect appropriate  NEUROLOGY: Right Sided weakness, gaze pref to left alert  SKIN: No rashes; no palpable lesions    LABS:                        10.5   7.85  )-----------( 143      ( 08 Aug 2022 00:06 )             32.0     08-08    142  |  107  |  13.6  ----------------------------<  156<H>  3.6   |  24.0  |  0.57    Ca    8.3<L>      08 Aug 2022 00:06  Phos  2.0     08-08  Mg     1.9     08-08        CARDIAC MARKERS ( 08 Aug 2022 00:06 )  x     / 0.02 ng/mL / x     / x     / x              Culture - Sputum (collected 07 Aug 2022 11:58)  Source: .Sputum Sputum  Gram Stain (07 Aug 2022 23:18):    Numerous polymorphonuclear leukocytes per low power field    Few Squamous epithelial cells per low power field    Few Gram positive cocci in pairs per oil power field    Rare Gram Variable Rods per oil power field      CAPILLARY BLOOD GLUCOSE            RADIOLOGY & ADDITIONAL TESTS:  Results Reviewed:   Imaging Personally Reviewed:  Electrocardiogram Personally Reviewed:

## 2022-08-08 NOTE — CHART NOTE - NSCHARTNOTEFT_GEN_A_CORE
HPI::  88F last known well at 2:15pm, found around 330pm to have aphasia and right sided weakness. Presents to [place] as code stroke found to have L MCA occlusion. Given TPA at 4:05pm and intubated for airway protection. Transferred to [facility] for mechanical thrombectomy, NIH 23, MRS 0 (04 Aug 2022 18:45)      HOSPITAL COURSE:  88F remote hx breast CA, HTN, HLD transferred from [facility] CODE STROKE L MCA M1 occlusion s/p TPA and M.T. TICI 3 recanalization post stroke day 4. 24 hour CT performed revealing Acute left ADRYAN, left MCA, & right PCA territory infarctions- likely cardio-embolic source.  Patient extubated on 8/5. Post extubation patient with tenuous respiratory status requiring diuresis with 20mg IV lasix, duonebs and frequent NT suctioning.  CXR concerning for development of a RLL infiltrate and IV zosyn was started on 8/7. sputum cx pending, + gram + cocci in pairs and gram negative rods. NGT placed for poor mental status and failed swallow evaluation on 8/8. GOC discussion between Dr. Munroe and Daughter Nikole Meneses (), patient is now DNR/DNI. Palliative care consult placed - pending PEG discussion. Cardiology consulted for Stroke work up and possible ILR.     Vital Signs Last 24 Hrs  T(C): 38.3 (08 Aug 2022 11:00), Max: 38.3 (08 Aug 2022 11:00)  T(F): 100.9 (08 Aug 2022 11:00), Max: 100.9 (08 Aug 2022 11:00)  HR: 76 (08 Aug 2022 11:00) (68 - 166)  BP: 145/63 (08 Aug 2022 11:00) (107/57 - 154/133)  BP(mean): 84 (08 Aug 2022 11:00) (70 - 142)  RR: 17 (08 Aug 2022 11:00) (12 - 26)  SpO2: 95% (08 Aug 2022 11:00) (95% - 100%)    Parameters below as of 08 Aug 2022 11:00  Patient On (Oxygen Delivery Method): room air        PHYSICAL EXAM:  PHYSICAL EXAM:  GENERAL: NAD, no accessory muscle use. 2L NC  HEAD:  Atraumatic, normocephalic  WOUND: Dressing clean dry intact  ARACELIS COMA SCORE: E- V- M- =10       E:  3= to voice        V:  2= incomprehensible sounds        M:  5= localizes   MENTAL STATUS: Opens eyes to voice; Says few words- mostly incomprehensible, not following commands  CRANIAL NERVES: L gaze preference, does not cross to right. Attends tracker towards left side. R facial. speech slurred and incomprehensible   REFLEXES: PERRL.   MOTOR: LUE AG, LLE 2/5, RUE minimal withdrawal/ RLE weak withdrawal  COORDINATION: Gait not assessed      LABS:                        10.5   7.85  )-----------( 143      ( 08 Aug 2022 00:06 )             32.0     08-08    142  |  107  |  13.6  ----------------------------<  156<H>  3.6   |  24.0  |  0.57    Ca    8.3<L>      08 Aug 2022 00:06  Phos  2.0     08-08  Mg     1.9     08-08 08-07 @ 07:01 - 08-08 @ 07:00  --------------------------------------------------------  IN: 1814.8 mL / OUT: 500 mL / NET: 1314.8 mL    08-08 @ 07:01 - 08-08 @ 11:50  --------------------------------------------------------  IN: 650 mL / OUT: 120 mL / NET: 530 mL        RADIOLOGY & ADDITIONAL TESTS:  < from: CT Head No Cont (08.05.22 @ 14:36) >      ACC: [id number] EXAM:  CT BRAIN                          PROCEDURE DATE:  08/05/2022          INTERPRETATION:  CLINICAL INDICATIONS:  cva    COMPARISON: CT head dated 8/4/2022    TECHNIQUE: Noncontrast CT of the head. Multiplanar reformations are   submitted.    FINDINGS:  Evolving acute left MCA territory infarct involving the left frontal   lobe, left basal ganglia. Acute anterior left temporal lobe and acute   right occipital lobe infarction. Acute anterior inferior left frontal   lobe infarction. No evidence of hemorrhagic transformation. Consider MRI   for further evaluation.    Status post left ocular lens replacement. The orbits, mastoid air cells   and visualized paranasal sinuses are otherwise unremarkable. The   calvarium is intact.Consider MRI as clinically warranted.    IMPRESSION: Acute left ADRYAN, left MCA, & right PCA territory infarctions.   No evidence of hemorrhagic transformation.    --- End of Report ---            GITA TAMAYO MD; Attending Radiologist  This document has been electronically signed. Aug  5 2022  2:56PM    < end of copied text >      Neuro:   -D/W NSICU attending bessy Padgett for downgrade to SDU  - q2 neuro checks   - CTH: acute L ADRYAN/ MCA/ Right PCA infarcts- likely cardioembolic  -HOB elevated 30 degrees, neck midline  - Na - goal 140 - 145. continue salt tabs 2g TID  - pending MRI non con brain to evaluate stroke, MRA neck (poor eval of carotids on CTA)  - ASA 81  -Melatonin 3 QHS for sleep  -PRN APAP for pain/ fever. avoid oversedation with pain meds  -PT/OT/SLP      CV:  - HTN ; Stage 2 Diastolic Dysfunction  -SBP goal 100-150  - Card consult for Loop recorder   - Maintain monitoring for arrythmia   - metoprolol 25 mg BID - home medication  - hold home amlodipine 2.5 mg daily / - Cardura added today for urinary retention.   -If BP trend stable with cardura, consider readding home dose amlodipine tomorrow  -TTE: ef 70-75% Grade 2 diastolic dysfunction, moderate AS, mod AR, mild MR, mod TR, mild pulm HTN  -LDL 72 continue low dose statin    PULM:  -PRN supplemental NC for hypoxia/ dyspnea  -May require NT suctioning  -ASPIRATION PRECAUTIONS.  -Maintain HOB ELEVATED 30 degrees  -RML infiltrate. Pip-tazo started 8/7 (day 2/7) continue until 8/14  -sputum cx: gram + cocci in pairs, gram variable rods  -MRSA neg - no vanco  -extubated 8/5  -PRN Duonebs  -Chest PT/ pulm toileting/ incentive spirometry PRN     GI:  - Jevity - TF goal @ 40  -failed s/s 8/8 - may require PEG., pending GOC with palliative and daughter today  - stool softeners- Last BM - large 8/7/22  - D/C protonix     :   - [place] placed 8/6 for urinary retention  -Cardura added 2mg for 8/8 evening. please d/c if hypotension occurs  - Free H2O- 200 q8    -Replace electrolytes PRN  -Monitor I+O  -Monitor Scr    Endo:   LDL- 72- continue statin  HGBA1C- 5.7  TSH - 2.41       ID:   -Fever- R LL ASP Pneumonia vs pneumonitis  - Day # 2/7 PIP{/Tazo continue til 8/14, sputum cx pending organism  - MRSA nasal neg - no vanco  - UA- neg s      Heme:    -Chronic Thrombocytopenia PLT today 143 8/8  - Will monitor- stable    - Baseline  ---> 143  - SCDs  - lovenox - 40mg q day SQ   - Doppler B/L LE - neg    I attest that full sign out was given to accepting hospitalist Dr. Oh. Please see accepting service's note regarding their specific plan of care and treatment. Please reconsult NSICU team [phone] at any time for any questions or reconsultation. Thank you!

## 2022-08-08 NOTE — CONSULT NOTE ADULT - PROBLEM SELECTOR RECOMMENDATION 9
Pt presents as a transfer from [place] as code stroke S/P IV tPA and Thrombectomy for L Proximal MCA occlusion with TICI 3 reperfusion on 8-04-22.  CT with Acute left ADRYAN left MCA, & right PCA territory infarctions suspicious for cardioembolic source however no arrythmia identified thus far.   Trop x 1 negative   Echo from 08/05/22 shows EF 70 to 75%. Pseudo-normal pattern of left ventricular. Myocardial filling (Grade II diastolic dysfunction). Mild to moderate aortic valve stenosis. Moderate aortic regurgitation. Mild mitral annular calcification. Mild to moderate mitral valve regurgitation. Moderate tricuspid regurgitation and mild pulmonary   hypertension.  Telemonitor  SBP goal keep Normotensive as per neurology  Continue ASA, and Lipitor   Maintain K+~ 4 and mag ~2  Trend CE. Trend trops x 3 q6. Serial EKGs  A1C, lipid panel fasting, TFTs, sed rate to ro comorbidities   DASH diet  EP consult x evaluation for loop recorder placement   Conservative management Pt presents as a transfer from [place] as code stroke S/P IV tPA and Thrombectomy for L Proximal MCA occlusion with TICI 3 reperfusion on 8-04-22.  CT with Acute left ADRYAN left MCA, & right PCA territory infarctions suspicious for cardioembolic source   Trop x 1 negative   EKG show Sinus arrythmia. Non ischemic   Echo from 08/05/22 shows EF 70 to 75%. Pseudo-normal pattern of left ventricular. Myocardial filling (Grade II diastolic dysfunction). Mild to moderate aortic valve stenosis. Moderate aortic regurgitation. Mild mitral annular calcification. Mild to moderate mitral valve regurgitation. Moderate tricuspid regurgitation and mild pulmonary   hypertension.  Telemonitor  SBP goal keep Normotensive as per neurology  Continue ASA, and Lipitor   Maintain K+~ 4 and mag ~2  Trend CE. Trend trops x 3 q6. Serial EKGs  A1C, lipid panel fasting, TFTs, sed rate to ro comorbidities   DASH diet  EP consult x evaluation for loop recorder placement   Conservative management  Neuro is following

## 2022-08-08 NOTE — SWALLOW BEDSIDE ASSESSMENT ADULT - SLP GENERAL OBSERVATIONS
Pt received asleep in bed, arousable with maximal multi-modaility cues and requiring cues to sustain arousal, minimally verbal (stated, "I'm fine" only), +NGT, on room air, Sp02 96-98%, nonverbal pain 0/10 pre/post eval Pt received asleep in bed, arousable with maximal multi-modaility cues and requiring cues to sustain arousal, minimally verbal (stated, "I'm fine" only), +NGT, on room air, Sp02 96-98%, left wrist restraint, not following commands,  nonverbal pain 0/10 pre/post eval

## 2022-08-08 NOTE — PROGRESS NOTE ADULT - SUBJECTIVE AND OBJECTIVE BOX
HPI:  88F last known well at 2:15pm, found around 330pm to have aphasia and right sided weakness. Presents to [facility] as code stroke found to have L MCA occlusion. Given TPA at 4:05pm and intubated for airway protection. Transferred to [facility] for mechanical thrombectomy, NIH 23, MRS 0 (04 Aug 2022 18:45)      INTERVAL HPI/OVERNIGHT EVENTS:  No acute events. Downgraded out of NSICU to SDU floor.     Vital Signs Last 24 Hrs  T(C): 36.7 (08 Aug 2022 15:00), Max: 38.3 (08 Aug 2022 11:00)  T(F): 98.1 (08 Aug 2022 15:00), Max: 100.9 (08 Aug 2022 11:00)  HR: 83 (08 Aug 2022 15:00) (69 - 166)  BP: 139/60 (08 Aug 2022 15:00) (104/63 - 154/133)  BP(mean): 82 (08 Aug 2022 15:00) (70 - 142)  RR: 14 (08 Aug 2022 15:00) (13 - 26)  SpO2: 96% (08 Aug 2022 15:00) (95% - 99%)    Parameters below as of 08 Aug 2022 15:00  Patient On (Oxygen Delivery Method): room air        PHYSICAL EXAM:  GENERAL: NAD, no accessory muscle use. 2L NC  HEAD:  Atraumatic, normocephalic  WOUND: Dressing clean dry intact  ARACELIS COMA SCORE: E- V- M- =10       E:  3= to voice        V:  2= incomprehensible sounds        M:  5= localizes   MENTAL STATUS: Opens eyes to voice; Says few words "I'm ok"- mostly incomprehensible, not following commands  CRANIAL NERVES: L gaze preference, does not cross to right. Attends tracker towards left side. R facial. speech slurred and incomprehensible   REFLEXES: PERRL.   MOTOR: LUE AG, LLE moves in plane of bed, RUE very minimal withdrawal/ RLE weak 1/5 to noxious  COORDINATION: Gait not assessed    LABS:                        10.5   7.85  )-----------( 143      ( 08 Aug 2022 00:06 )             32.0     08-08    142  |  107  |  13.6  ----------------------------<  156<H>  3.6   |  24.0  |  0.57    Ca    8.3<L>      08 Aug 2022 00:06  Phos  2.0     08-08  Mg     1.9     08-08 08-07 @ 07:01 - 08-08 @ 07:00  --------------------------------------------------------  IN: 1814.8 mL / OUT: 500 mL / NET: 1314.8 mL    08-08 @ 07:01 - 08-08 @ 16:17  --------------------------------------------------------  IN: 805 mL / OUT: 170 mL / NET: 635 mL        RADIOLOGY & ADDITIONAL TESTS:  < from: CT Head No Cont (08.05.22 @ 14:36) >    ACC: [id number] EXAM:  CT BRAIN                          PROCEDURE DATE:  08/05/2022          INTERPRETATION:  CLINICAL INDICATIONS:  cva    COMPARISON: CT head dated 8/4/2022    TECHNIQUE: Noncontrast CT of the head. Multiplanar reformations are   submitted.    FINDINGS:  Evolving acute left MCA territory infarct involving the left frontal   lobe, left basal ganglia. Acute anterior left temporal lobe and acute   right occipital lobe infarction. Acute anterior inferior left frontal   lobe infarction. No evidence of hemorrhagic transformation. Consider MRI   for further evaluation.    Status post left ocular lens replacement. The orbits, mastoid air cells   and visualized paranasal sinuses are otherwise unremarkable. The   calvarium is intact.Consider MRI as clinically warranted.    IMPRESSION: Acute left ADRYAN, left MCA, & right PCA territory infarctions.   No evidence of hemorrhagic transformation.    --- End of Report ---            GITA TAMAYO MD; Attending Radiologist  This document hasbeen electronically signed. Aug  5 2022  2:56PM    < end of copied text >

## 2022-08-08 NOTE — PROGRESS NOTE ADULT - ASSESSMENT
IMPRESSION:  - S/P IV tPA and Thrombectomy for L Proximal MCA occlusion with TICI 3 reperfusion on 8-04-22.    Now has L MCA, L ADRYAN and R PCA infarcts.      ASSESSMENT/ PLAN:     - Neuro checks and vital signs Q 2 hours.  - SBP goal keep Normotensive.  - ASA 81 mg PO or 300 NM QD.  - Lipitor for LDL goal of < 70 .  - Telemetry monitoring.  - CT/CTA/CTP -images and reports were reviewed.   - MRI Brain stroke protocol.  - TTE  -Reports as above were reviewed.   - PT OT SLP evaluation.  - SCD/ SQ Lovenox for DVT prophylaxis.         IMPRESSION:  - S/P IV tPA and Thrombectomy for L Proximal MCA occlusion with TICI 3 reperfusion on 8-04-22.    Now has L MCA, L ADRYAN and R PCA infarcts.      ASSESSMENT/ PLAN:     - Neuro checks and vital signs Q 2 hours.  - SBP goal keep Normotensive.  - ASA 81 mg PO or 300 CT QD.  - Lipitor for LDL goal of < 70 .  - Telemetry monitoring.  - CT/CTA/CTP -images and reports were reviewed.   - TTE  -Reports as above were reviewed.   - PT OT SLP evaluation.  - Goals of care discussions.  - SCD/ SQ Lovenox for DVT prophylaxis.

## 2022-08-08 NOTE — CONSULT NOTE ADULT - SUBJECTIVE AND OBJECTIVE BOX
HPI: 88F with PMH as listed admitted 8/4 with right sided weakness, found to have acute stroke, left MCA occlusion s/p TPA at [place] and transferred here for mechanical thrombectomy. Extubated 8/5, being treated for possible aspiration pneumonia, now with weakness, and dysphagia. Patient made DNR and DNI by neuro ICU. palliative consulted for additional goals of care.     PERTINENT PMH REVIEWED: Yes     PAST MEDICAL & SURGICAL HISTORY:  HTN (hypertension)  Hypercholesteremia  Breast cancer  No significant past surgical history    SOCIAL HISTORY:                      Substance history: unable to obtain due to poor mental status                     Admitted from:  [facility]                     Muslim/spirituality:                    Cultural concerns: none                       Surrogate - [facility]     FAMILY HISTORY:  No pertinent family history in first degree relatives    Allergies    No Known Allergies    ADVANCE DIRECTIVES/TREATMENT PREFERENCES:  DNR/DNI - MOLST, continue all other medical treatments    Baseline ADLs (prior to admission):  Independent    Karnofsky/Palliative Performance Status Version 2:  %  http://npcrc.org/files/news/palliative_performance_scale_ppsv2.pdf    Present Symptoms:     Dyspnea: none   Nausea/Vomiting: No  Anxiety:  No  Depression: unable   Fatigue: Yes   Loss of appetite: No  Constipation: none     Pain: none currently             Character-            Duration-            Effect-            Factors-            Frequency-            Location-            Severity-    Pain AD Score:  [email or web address].[place].[email or web address]/cog/painad.pdf (press ctrl + left click to view)    Review of Systems: Reviewed            Unable to obtain due to poor mentation   All others negative    MEDICATIONS  (STANDING):  AQUAPHOR (petrolatum Ointment) 1 Application(s) Topical two times a day  aspirin  chewable 81 milliGRAM(s) Oral daily  atorvastatin 20 milliGRAM(s) Oral at bedtime  chlorhexidine 2% Cloths 1 Application(s) Topical daily  doxazosin 2 milliGRAM(s) Oral at bedtime  enoxaparin Injectable 40 milliGRAM(s) SubCutaneous every 24 hours  melatonin 3 milliGRAM(s) Oral at bedtime  metoprolol tartrate 25 milliGRAM(s) Oral two times a day  multivitamin 1 Tablet(s) Oral daily  piperacillin/tazobactam IVPB.. 3.375 Gram(s) IV Intermittent every 8 hours  polyethylene glycol 3350 17 Gram(s) Oral daily  senna 2 Tablet(s) Oral at bedtime  sodium chloride 2 Gram(s) Oral three times a day    MEDICATIONS  (PRN):  acetaminophen    Suspension .. 650 milliGRAM(s) Oral every 6 hours PRN Temp greater or equal to 38C (100.4F), Mild Pain (1 - 3)  ALBUTerol    90 MICROgram(s) HFA Inhaler 2 Puff(s) Inhalation every 6 hours PRN Bronchospasm  albuterol/ipratropium for Nebulization 3 milliLiter(s) Nebulizer every 6 hours PRN Shortness of Breath and/or Wheezing      PHYSICAL EXAM:    Vital Signs Last 24 Hrs  T(C): 38 (08 Aug 2022 14:00), Max: 38.3 (08 Aug 2022 11:00)  T(F): 100.4 (08 Aug 2022 14:00), Max: 100.9 (08 Aug 2022 11:00)  HR: 82 (08 Aug 2022 14:00) (68 - 166)  BP: 120/82 (08 Aug 2022 14:00) (104/63 - 154/133)  BP(mean): 93 (08 Aug 2022 14:00) (70 - 142)  RR: 14 (08 Aug 2022 14:00) (13 - 26)  SpO2: 96% (08 Aug 2022 14:00) (95% - 100%)    Parameters below as of 08 Aug 2022 14:00  Patient On (Oxygen Delivery Method): room air    General: alert  sitting in bedside chair     HEENT: normal      Lungs: mild tachypnea     CV: normal      GI: normal     : normal     MSK: weakness     Neuro: right sided weakness     Skin:  no rash    LABS:                      10.5   7.85  )-----------( 143      ( 08 Aug 2022 00:06 )             32.0     08-08    142  |  107  |  13.6  ----------------------------<  156<H>  3.6   |  24.0  |  0.57    Ca    8.3<L>      08 Aug 2022 00:06  Phos  2.0     08-08  Mg     1.9     08-08    I&O's Summary    07 Aug 2022 07:01  -  08 Aug 2022 07:00  --------------------------------------------------------  IN: 1814.8 mL / OUT: 500 mL / NET: 1314.8 mL    08 Aug 2022 07:01  -  08 Aug 2022 14:12  --------------------------------------------------------  IN: 805 mL / OUT: 170 mL / NET: 635 mL    RADIOLOGY & ADDITIONAL STUDIES:    < from: CT Head No Cont (08.05.22 @ 14:36) >  ACC: [id number] EXAM:  CT BRAIN                          PROCEDURE DATE:  08/05/2022          INTERPRETATION:  CLINICAL INDICATIONS:  cva    COMPARISON: CT head dated 8/4/2022    TECHNIQUE: Noncontrast CT of the head. Multiplanar reformations are   submitted.    FINDINGS:  Evolving acute left MCA territory infarct involving the left frontal   lobe, left basal ganglia. Acute anterior left temporal lobe and acute   right occipital lobe infarction. Acute anterior inferior left frontal   lobe infarction. No evidence of hemorrhagic transformation. Consider MRI   for further evaluation.    Status post left ocular lens replacement. The orbits, mastoid air cells   and visualized paranasal sinuses are otherwise unremarkable. The   calvarium is intact.Consider MRI as clinically warranted.    IMPRESSION: Acute left ADRYAN, left MCA, & right PCA territory infarctions.   No evidence of hemorrhagic transformation.    --- End of Report ---      < end of copied text >

## 2022-08-09 LAB
ANION GAP SERPL CALC-SCNC: 9 MMOL/L — SIGNIFICANT CHANGE UP (ref 5–17)
BUN SERPL-MCNC: 13.5 MG/DL — SIGNIFICANT CHANGE UP (ref 8–20)
CALCIUM SERPL-MCNC: 8.1 MG/DL — LOW (ref 8.4–10.5)
CHLORIDE SERPL-SCNC: 107 MMOL/L — SIGNIFICANT CHANGE UP (ref 98–107)
CO2 SERPL-SCNC: 26 MMOL/L — SIGNIFICANT CHANGE UP (ref 22–29)
CREAT SERPL-MCNC: 0.46 MG/DL — LOW (ref 0.5–1.3)
CULTURE RESULTS: SIGNIFICANT CHANGE UP
EGFR: 92 ML/MIN/1.73M2 — SIGNIFICANT CHANGE UP
GLUCOSE SERPL-MCNC: 138 MG/DL — HIGH (ref 70–99)
HCT VFR BLD CALC: 28 % — LOW (ref 34.5–45)
HGB BLD-MCNC: 9.3 G/DL — LOW (ref 11.5–15.5)
MAGNESIUM SERPL-MCNC: 1.9 MG/DL — SIGNIFICANT CHANGE UP (ref 1.6–2.6)
MCHC RBC-ENTMCNC: 30.5 PG — SIGNIFICANT CHANGE UP (ref 27–34)
MCHC RBC-ENTMCNC: 33.2 GM/DL — SIGNIFICANT CHANGE UP (ref 32–36)
MCV RBC AUTO: 91.8 FL — SIGNIFICANT CHANGE UP (ref 80–100)
PHOSPHATE SERPL-MCNC: 2 MG/DL — LOW (ref 2.4–4.7)
PLATELET # BLD AUTO: 154 K/UL — SIGNIFICANT CHANGE UP (ref 150–400)
POTASSIUM SERPL-MCNC: 3.7 MMOL/L — SIGNIFICANT CHANGE UP (ref 3.5–5.3)
POTASSIUM SERPL-SCNC: 3.7 MMOL/L — SIGNIFICANT CHANGE UP (ref 3.5–5.3)
RBC # BLD: 3.05 M/UL — LOW (ref 3.8–5.2)
RBC # FLD: 14.3 % — SIGNIFICANT CHANGE UP (ref 10.3–14.5)
SODIUM SERPL-SCNC: 142 MMOL/L — SIGNIFICANT CHANGE UP (ref 135–145)
SPECIMEN SOURCE: SIGNIFICANT CHANGE UP
WBC # BLD: 7.89 K/UL — SIGNIFICANT CHANGE UP (ref 3.8–10.5)
WBC # FLD AUTO: 7.89 K/UL — SIGNIFICANT CHANGE UP (ref 3.8–10.5)

## 2022-08-09 PROCEDURE — 99223 1ST HOSP IP/OBS HIGH 75: CPT | Mod: FS

## 2022-08-09 PROCEDURE — 99233 SBSQ HOSP IP/OBS HIGH 50: CPT

## 2022-08-09 PROCEDURE — 99232 SBSQ HOSP IP/OBS MODERATE 35: CPT

## 2022-08-09 PROCEDURE — ZZZZZ: CPT

## 2022-08-09 RX ADMIN — Medication 81 MILLIGRAM(S): at 13:10

## 2022-08-09 RX ADMIN — PIPERACILLIN AND TAZOBACTAM 25 GRAM(S): 4; .5 INJECTION, POWDER, LYOPHILIZED, FOR SOLUTION INTRAVENOUS at 05:39

## 2022-08-09 RX ADMIN — CHLORHEXIDINE GLUCONATE 1 APPLICATION(S): 213 SOLUTION TOPICAL at 13:10

## 2022-08-09 RX ADMIN — Medication 1 APPLICATION(S): at 05:39

## 2022-08-09 RX ADMIN — SODIUM CHLORIDE 2 GRAM(S): 9 INJECTION INTRAMUSCULAR; INTRAVENOUS; SUBCUTANEOUS at 23:35

## 2022-08-09 RX ADMIN — SENNA PLUS 2 TABLET(S): 8.6 TABLET ORAL at 23:35

## 2022-08-09 RX ADMIN — Medication 25 MILLIGRAM(S): at 05:39

## 2022-08-09 RX ADMIN — Medication 3 MILLIGRAM(S): at 23:35

## 2022-08-09 RX ADMIN — ATORVASTATIN CALCIUM 20 MILLIGRAM(S): 80 TABLET, FILM COATED ORAL at 23:36

## 2022-08-09 RX ADMIN — PIPERACILLIN AND TAZOBACTAM 25 GRAM(S): 4; .5 INJECTION, POWDER, LYOPHILIZED, FOR SOLUTION INTRAVENOUS at 23:35

## 2022-08-09 RX ADMIN — Medication 1 TABLET(S): at 13:10

## 2022-08-09 RX ADMIN — PIPERACILLIN AND TAZOBACTAM 25 GRAM(S): 4; .5 INJECTION, POWDER, LYOPHILIZED, FOR SOLUTION INTRAVENOUS at 13:09

## 2022-08-09 RX ADMIN — Medication 2 MILLIGRAM(S): at 23:35

## 2022-08-09 RX ADMIN — ENOXAPARIN SODIUM 40 MILLIGRAM(S): 100 INJECTION SUBCUTANEOUS at 18:28

## 2022-08-09 RX ADMIN — SODIUM CHLORIDE 2 GRAM(S): 9 INJECTION INTRAMUSCULAR; INTRAVENOUS; SUBCUTANEOUS at 05:39

## 2022-08-09 RX ADMIN — Medication 1 APPLICATION(S): at 18:37

## 2022-08-09 RX ADMIN — Medication 25 MILLIGRAM(S): at 18:28

## 2022-08-09 NOTE — CONSULT NOTE ADULT - NS ATTEND AMEND GEN_ALL_CORE FT
pt with acute CVA, unclear etiology.   monitor on telemetry while inpatient. If paroxysmal AF documented, would benefit from anticoagulation when cleared by neurology.   If no AF noted, consider outpatient monitoring with MCOT vs ILR.   currently critically ill, if ILR c/w patients goals of care, can implant prior to discharge. please reconsult as needed. pt with acute CVA, unclear etiology. My review of telemetry reveals frequent episode of brief pAT/AF, lasting up to 13 seconds. Given this, I suspect she does have pAF, and will warrant anticoagulation.   Continue to monitor on telemetry while inpatient. If paroxysmal AF clarified would benefit from anticoagulation when cleared by neurology (and no need for ILR).  If no AF noted, consider outpatient monitoring with MCOT vs ILR.   currently critically ill, if ILR indicated and c/w patients goals of care, can implant prior to discharge.

## 2022-08-09 NOTE — PROGRESS NOTE ADULT - SUBJECTIVE AND OBJECTIVE BOX
OVERNIGHT EVENTS: awaiting MRI     Present Symptoms:     Dyspnea: none   Nausea/Vomiting: No  Anxiety:  No  Depression: unable   Fatigue: Yes   Loss of appetite: No  Constipation: none     Pain: none currently             Character-            Duration-            Effect-            Factors-            Frequency-            Location-            Severity-    Pain AD Score:  [email or web address].[email or web address]/painad.pdf (press ctrl + left click to view)    Review of Systems: Reviewed               Unable to obtain due to poor mentation   All others negative    MEDICATIONS  (STANDING):  AQUAPHOR (petrolatum Ointment) 1 Application(s) Topical two times a day  aspirin  chewable 81 milliGRAM(s) Oral daily  atorvastatin 20 milliGRAM(s) Oral at bedtime  chlorhexidine 2% Cloths 1 Application(s) Topical daily  doxazosin 2 milliGRAM(s) Oral at bedtime  enoxaparin Injectable 40 milliGRAM(s) SubCutaneous every 24 hours  melatonin 3 milliGRAM(s) Oral at bedtime  metoprolol tartrate 25 milliGRAM(s) Oral two times a day  multivitamin 1 Tablet(s) Oral daily  piperacillin/tazobactam IVPB.. 3.375 Gram(s) IV Intermittent every 8 hours  polyethylene glycol 3350 17 Gram(s) Oral daily  senna 2 Tablet(s) Oral at bedtime  sodium chloride 2 Gram(s) Oral three times a day    MEDICATIONS  (PRN):  acetaminophen    Suspension .. 650 milliGRAM(s) Oral every 6 hours PRN Temp greater or equal to 38C (100.4F), Mild Pain (1 - 3)  ALBUTerol    90 MICROgram(s) HFA Inhaler 2 Puff(s) Inhalation every 6 hours PRN Bronchospasm  albuterol/ipratropium for Nebulization 3 milliLiter(s) Nebulizer every 6 hours PRN Shortness of Breath and/or Wheezing    PHYSICAL EXAM:    Vital Signs Last 24 Hrs  T(C): 36.9 (09 Aug 2022 08:00), Max: 38.3 (08 Aug 2022 11:00)  T(F): 98.5 (09 Aug 2022 08:00), Max: 100.9 (08 Aug 2022 11:00)  HR: 78 (09 Aug 2022 10:00) (73 - 96)  BP: 143/54 (09 Aug 2022 10:00) (101/56 - 145/63)  BP(mean): 87 (09 Aug 2022 06:00) (70 - 93)  RR: 23 (09 Aug 2022 10:00) (14 - 24)  SpO2: 94% (09 Aug 2022 10:00) (94% - 98%)    Parameters below as of 09 Aug 2022 10:00  Patient On (Oxygen Delivery Method): room air    General: alert, not oriented, able to answer appropriately yes and no     Karnofsky:  30 %    HEENT: normal      Lungs: comfortable     CV: normal      GI: normal, NG tube       : chamorro    MSK: weakness     Skin: no rash    LABS:                      9.3    7.89  )-----------( 154      ( 09 Aug 2022 05:32 )             28.0     08-09    142  |  107  |  13.5  ----------------------------<  138<H>  3.7   |  26.0  |  0.46<L>    Ca    8.1<L>      09 Aug 2022 05:32  Phos  2.0     08-09  Mg     1.9     08-09    I&O's Summary    08 Aug 2022 07:01  -  09 Aug 2022 07:00  --------------------------------------------------------  IN: 1485 mL / OUT: 470 mL / NET: 1015 mL    RADIOLOGY & ADDITIONAL STUDIES:    < from: US Duplex Venous Lower Ext Complete, Bilateral (08.06.22 @ 10:33) >  IMPRESSION:  No evidence of deep venous thrombosis in either lower extremity.    --- End of Report ---    < end of copied text >    < from: Xray Chest 1 View- PORTABLE-Urgent (Xray Chest 1 View- PORTABLE-Urgent .) (08.05.22 @ 16:00) >    IMPRESSION: NG tube tip beyond GE junction.  No radiographic evidence of active chest disease.    --- End of Report ---    < end of copied text >    ADVANCE DIRECTIVES/TREATMENT PREFERENCES:  DNR/DNI

## 2022-08-09 NOTE — CONSULT NOTE ADULT - ASSESSMENT
88F with history of hypertension, hyperlipidemia, dementia, and breast cancer presented to [place] 8/4/22 with L MCA occlusion. Given TPA, intubated for airway protection and transferred to [facility] for mechanical thrombectomy. Mchanical thrombectomy was performed for left M1 occlusion with resultant TICI 3 and without acute complication. Extubated 8/5, now being treated for possible aspiration pneumonia, with ongoing weakness, and dysphagia requiring NG tube. Patient made DNR and DNI while in neuro ICU, with palliative care involved for ongoing GOC discussion. She also had reported short runs of atrial fibrillation noted on telemetry in NSICU (no strips available for writer review), as well as very frequent PACs and 13 sec run of irregular tachycardia noted on  telemetry today. EP is consulted for possible ILR implant.     Recommendations:   Pt w/ reported PAF during admission, but no strips available for writer review.   Tele does currently demonstrate very frequent PACs and 1 episode irregular narrow complex tachycardia lasting 13 seconds.   Continue telemetry monitoring while admitted.   Loop implantation for cryptogenic stroke is generally unnecessary if AF is subsequently documented on telemetry and is not advisable when pt is expected to remain admitted on telemetry >24 hours.   Given multiple acute issues including dysphagia (NPO w/ NG tube, possible need for PEG) with evolving GOC conversation, as well as treatment for aspiration pneumonia, etc., pt is unlikely to be discharged in the next 24 - 48 hours.   Additionally, it is unclear if ILR implant would be consistent w/ pt GOC.   If ILR is ultimately indicated, NAVIN would need to be completed and patient within 24 hours of expected discharge at time of implant.   No acute EP intervention. Please recall EP once GOC are clarified and pt is approaching discharge.  D/W Dr. Alarcon; further recs to follow.

## 2022-08-09 NOTE — PROGRESS NOTE ADULT - SUBJECTIVE AND OBJECTIVE BOX
Patient is a 88y old  Female who presents with a chief complaint of Stroke (09 Aug 2022 16:44)    HPI:  88F last known well at 2:15pm, found around 330pm to have aphasia and right sided weakness. Presents to [facility] as code stroke found to have L MCA occlusion. Given TPA at 4:05pm and intubated for airway protection. Transferred to [facility] for mechanical thrombectomy, NIH 23, MRS 0 (04 Aug 2022 18:45)      Interval history:  Patient seen and examined by neuro IR team. Patient unable to provide ROS 2/2 aphasia, was asking "please give me the money." Patient re-evaluated by speech therapy, remained NPO. No other acute events reported.       Vital Signs Last 24 Hrs  T(C): 37.4 (09 Aug 2022 12:13), Max: 37.4 (09 Aug 2022 12:13)  T(F): 99.3 (09 Aug 2022 12:13), Max: 99.3 (09 Aug 2022 12:13)  HR: 86 (09 Aug 2022 16:00) (73 - 95)  BP: 124/74 (09 Aug 2022 16:00) (101/56 - 143/54)  BP(mean): 87 (09 Aug 2022 06:00) (70 - 89)  RR: 18 (09 Aug 2022 16:00) (16 - 24)  SpO2: 96% (09 Aug 2022 16:00) (94% - 96%)    Parameters below as of 09 Aug 2022 16:00  Patient On (Oxygen Delivery Method): room air      Physical Exam:  Constitutional: NAD, lying in bed  Neuro  * Mental Status:  EO to voice, speaking but incoherent, moving spontaneously, dysarthric   * Cranial Nerves: PERRL, tongue midline, L gaze preference but able to overcome, R facial droop  * Motor: LUE AG without drift, RUE AG but drifts to bed, LLE moves in the plane of the bed, RLE weak WD   * Sensory: Sensation grossly intact to noxious stimuli   * Reflexes: not assessed       LABS:                        9.3    7.89  )-----------( 154      ( 09 Aug 2022 05:32 )             28.0     08-09    142  |  107  |  13.5  ----------------------------<  138<H>  3.7   |  26.0  |  0.46<L>    Ca    8.1<L>      09 Aug 2022 05:32  Phos  2.0     08-09  Mg     1.9     08-09    CULTURES:  Culture Results:   Normal Respiratory Tiera present (08-07 @ 11:58)      Medications:  MEDICATIONS  (STANDING):  AQUAPHOR (petrolatum Ointment) 1 Application(s) Topical two times a day  aspirin  chewable 81 milliGRAM(s) Oral daily  atorvastatin 20 milliGRAM(s) Oral at bedtime  chlorhexidine 2% Cloths 1 Application(s) Topical daily  doxazosin 2 milliGRAM(s) Oral at bedtime  enoxaparin Injectable 40 milliGRAM(s) SubCutaneous every 24 hours  melatonin 3 milliGRAM(s) Oral at bedtime  metoprolol tartrate 25 milliGRAM(s) Oral two times a day  multivitamin 1 Tablet(s) Oral daily  piperacillin/tazobactam IVPB.. 3.375 Gram(s) IV Intermittent every 8 hours  polyethylene glycol 3350 17 Gram(s) Oral daily  senna 2 Tablet(s) Oral at bedtime  sodium chloride 2 Gram(s) Oral three times a day    MEDICATIONS  (PRN):  acetaminophen    Suspension .. 650 milliGRAM(s) Oral every 6 hours PRN Temp greater or equal to 38C (100.4F), Mild Pain (1 - 3)  ALBUTerol    90 MICROgram(s) HFA Inhaler 2 Puff(s) Inhalation every 6 hours PRN Bronchospasm  albuterol/ipratropium for Nebulization 3 milliLiter(s) Nebulizer every 6 hours PRN Shortness of Breath and/or Wheezing      RADIOLOGY & ADDITIONAL STUDIES:  No new neuro IR imaging to review.

## 2022-08-09 NOTE — PROGRESS NOTE ADULT - ASSESSMENT
88F remote hx breast CA, HTN, HLD transferred from [facility] for Acute Stroke (L MCA M1 occlusion s/p TPA and M.T. TICI 3 recanalization) s/p extubation on 8/5 ccb Acute Hypoxic Resp failure sec to aspiration pna started on IV zosyn on 8/7 and dysphagia/poor mental status requiring NGT placement. GOC assessed on ICU resulting ind ecision to make patietn DNR DNI now pending CVA work up.    *Acute CVA s/p tpa s/p mechanical thrombectomy   CT with Acute left ADRYAN left MCA, & right PCA territory infarctions suspicious for cardioembolic source however no arrythmia identified thus far.   q4 neuro with vital   Aspiration precautions  Na goal 140 - 145. Currently on Salt Tabs 2g TID  MRI non con brain pending   MRA neck (poor eval of carotids on CTA)  ASA 81, Atorvastatin 20   SBP goal 100-150  B/L LE doppler neg for DVT  Cardio consulted for Loop/NAVIN    *Acute Hypoxic Respiratory Failure Secondary to Aspiration PNA   Cont Zosyn    ID Consulted in ICU  Currently on NC to wean as tolerated  Frequent suctioning and aspiration precautions    *Dysphagia   Fails swallow eval. likely sec to cva and poor mental status   TF via NGT for now  Palliative consulted to discuss further GOC regarding feeds. PEG/APOLLO vs Pleasure feeds/Hospice  pending repeat speech eval     *HTN  SBP goal 100-150  Cont metoprolol 25 mg BID  Hold home amlodipine 2.5 mg daily  Started on Cardura 8/8 for HTN/urinary retention.   If BP trend stable with cardura, consider aading home dose amlodipine in am    HFpEF  TTE: ef 70-75% Grade 2 diastolic dysfunction, moderate AS, mod AR, mild MR, mod TR, mild pulm HTN  Cont metoprolol  Cardiology consulted for NAVIN/ILR to investigate for LV thrombus/pfo and or afib    HLD  statin     Diet: TF goal 40  DVT ppx: Lovenox  Dispo: Transfer to [facility] from [facility]  Pulmonary embolism

## 2022-08-09 NOTE — CONSULT NOTE ADULT - SUBJECTIVE AND OBJECTIVE BOX
CARDIAC ELECTROPHYSIOLOGY  [facility]   Office: [address] [address]  Telephone: [phone] Fax:[phone]      88F with history of hypertension, hyperlipidemia, dementia, and breast cancer presented to [facility] 8/4/22 with L MCA occlusion. Given TPA, intubated for airway protection and transferred to [facility] for mechanical thrombectomy. Mchanical thrombectomy was performed for left M1 occlusion with resultant TICI 3 and without acute complication. Extubated 8/5, now being treated for possible aspiration pneumonia, with ongoing weakness, and dysphagia requiring NG tube. Patient made DNR and DNI while in neuro ICU, with palliative care involved for ongoing GOC discussion. She also had reported short runs of atrial fibrillation noted on telemetry in NSICU (no strips available for writer review), as well as very frequent PACs and 13 sec run of irregular tachycardia noted on  telemetry today. EP is consulted for possible ILR implant.       PAST MEDICAL & SURGICAL HISTORY:  HTN (hypertension)  Hypercholesteremia  Breast cancer      REVIEW OF SYSTEMS: Pt unable to participate in meaningful ROS, but generally denies complaint.       MEDICATIONS  (STANDING):  AQUAPHOR (petrolatum Ointment) 1 Application(s) Topical two times a day  aspirin  chewable 81 milliGRAM(s) Oral daily  atorvastatin 20 milliGRAM(s) Oral at bedtime  chlorhexidine 2% Cloths 1 Application(s) Topical daily  doxazosin 2 milliGRAM(s) Oral at bedtime  enoxaparin Injectable 40 milliGRAM(s) SubCutaneous every 24 hours  melatonin 3 milliGRAM(s) Oral at bedtime  metoprolol tartrate 25 milliGRAM(s) Oral two times a day  multivitamin 1 Tablet(s) Oral daily  piperacillin/tazobactam IVPB.. 3.375 Gram(s) IV Intermittent every 8 hours  polyethylene glycol 3350 17 Gram(s) Oral daily  senna 2 Tablet(s) Oral at bedtime  sodium chloride 2 Gram(s) Oral three times a day    MEDICATIONS  (PRN):  acetaminophen    Suspension .. 650 milliGRAM(s) Oral every 6 hours PRN Temp greater or equal to 38C (100.4F), Mild Pain (1 - 3)  ALBUTerol    90 MICROgram(s) HFA Inhaler 2 Puff(s) Inhalation every 6 hours PRN Bronchospasm  albuterol/ipratropium for Nebulization 3 milliLiter(s) Nebulizer every 6 hours PRN Shortness of Breath and/or Wheezing      Allergies  No Known Allergies      SOCIAL HISTORY: unknown    FAMILY HISTORY:  No pertinent family history in first degree relatives        Vital Signs Last 24 Hrs  T(C): 37.4 (09 Aug 2022 12:13), Max: 38 (08 Aug 2022 14:00)  T(F): 99.3 (09 Aug 2022 12:13), Max: 100.4 (08 Aug 2022 14:00)  HR: 83 (09 Aug 2022 12:00) (73 - 95)  BP: 137/56 (09 Aug 2022 12:00) (101/56 - 143/54)  BP(mean): 87 (09 Aug 2022 06:00) (70 - 93)  RR: 19 (09 Aug 2022 12:00) (14 - 24)  SpO2: 95% (09 Aug 2022 12:00) (94% - 98%)    Parameters below as of 09 Aug 2022 12:00  Patient On (Oxygen Delivery Method): room air      Physical Exam:  Constitutional: A&Ox1-2 (self, knows she is in a hospital), NAD  Neck: supple, No JVD  Cardiovascular: +S1S2 RRR, frequent ectopy, II/VI ELIZABETH   Pulmonary: scattered rhonchi  Abdomen: +BS, soft NTND  Extremities: no edema b/l, +distal pulses b/l  Neuro: R upper and lower extremity weakness, left sided strength grossly intact    LABS:                      9.3    7.89  )-----------( 154      ( 09 Aug 2022 05:32 )             28.0     142  |  107  |  13.5  ----------------------------<  138<H>  3.7   |  26.0  |  0.46<L>    Ca    8.1<L>      09 Aug 2022 05:32  Phos  2.0     08-09  Mg     1.9     08-09      CARDIAC MARKERS ( 08 Aug 2022 00:06 )  x     / 0.02 ng/mL / x     / x     / x          RADIOLOGY & ADDITIONAL STUDIES:  < from: TTE Echo Complete w/ Contrast w/ Doppler (08.05.22 @ 09:45) >  HYSICIAN INTERPRETATION:  Left Ventricle: The left ventricular internal cavity size is normal. The   LVH involves basal walls.  Global LV systolic function was hyperdynamic. Left ventricular ejection   fraction, by visual estimation, is 70 to 75%. Spectral Doppler shows   pseudonormal pattern of left ventricular myocardial filling (Grade II   diastolic dysfunction).  Right Ventricle: Normal right ventricular size and function.  Left Atrium: The left atrium is normal in size.  Right Atrium: The right atrium is normal in size.  Pericardium: There is no evidence of pericardial effusion.  Mitral Valve: Mild thickening of the anterior and posterior mitral valve   leaflets. Mitral leaflet mobility is normal. There is mild mitral annular   calcification. Mild to moderate mitral valve regurgitation is seen.  Tricuspid Valve: Moderate tricuspid regurgitation is visualized.   Estimated pulmonary artery systolic pressure is 43.4 mmHg assuming a   right atrial pressure of 3 mmHg, which is consistent with mild pulmonary   hypertension.  Aortic Valve: The aortic valve is trileaflet. Mild to moderate aortic   stenosis is present. Moderate aortic valve regurgitation is seen.  Pulmonic Valve: Trace pulmonic valve regurgitation.  Aorta: The aortic root is normal in size and structure.  Pulmonary Artery: The main pulmonary artery is normal in size.  Venous: The inferior vena cava was normal sized, with respiratory size   variation greater than 50%.  In comparison to the previous echocardiogram(s): There are no prior   studies on this patient for comparison purposes.      Summary:   1. Technically difficult study.   2. Hyperdynamic global left ventricular systolic function.   3. Left ventricular ejection fraction, by visual estimation, is 70 to 75%.   4. Spectral Doppler shows pseudonormal pattern of left ventricular myocardial filling (Grade II diastolic dysfunction).   5. Normal right ventricular size and function.   6. The left atrium is normal in size.   7. Mild to moderate aortic valve stenosis.   8. Moderate aortic regurgitation.   9. Mild thickening of the anterior and posterior mitral valve leaflets.  10. Mild mitral annular calcification.  11. Mild to moderate mitral valve regurgitation.  12. Moderate tricuspid regurgitation.  13. Estimated pulmonary artery systolic pressure is 43.4 mmHg assuming a right atrial pressureof 3 mmHg, which is consistent with mild pulmonary hypertension.  14. There is no evidence of pericardial effusion.    MD Nakia Electronically signed on 8/5/2022 at 1:51:31 PM    < end of copied text >

## 2022-08-09 NOTE — PROGRESS NOTE ADULT - SUBJECTIVE AND OBJECTIVE BOX
Neurology   JAISON MAXINE 88y Female     HPI:  88F last known well at 2:15pm, found around 330pm to have aphasia and right sided weakness. Presents to [facility] as code stroke found to have L MCA occlusion. Given TPA at 4:05pm and intubated for airway protection. Transferred to [facility] for mechanical thrombectomy, NIH 23, MRS 0 (04 Aug 2022 18:45)  She is - S/P IV tPA and Thrombectomy for L Proximal MCA occlusion with TICI 3 reperfusion on 8-04-22.  PMH: HTN (hypertension)    Hypercholesteremia    Breast cancer         PSH: No significant past surgical history          FAMILY HISTORY:  No pertinent family history in first degree relatives        SOCIAL HISTORY:  No history of tobacco or alcohol use     Allergies    No Known Allergies    Intolerances        Vital Signs Last 24 Hrs  T(C): 37.4 (09 Aug 2022 12:13), Max: 37.4 (09 Aug 2022 12:13)  T(F): 99.3 (09 Aug 2022 12:13), Max: 99.3 (09 Aug 2022 12:13)  HR: 83 (09 Aug 2022 12:00) (73 - 95)  BP: 137/56 (09 Aug 2022 12:00) (101/56 - 143/54)  BP(mean): 87 (09 Aug 2022 06:00) (70 - 89)  RR: 19 (09 Aug 2022 12:00) (14 - 24)  SpO2: 95% (09 Aug 2022 12:00) (94% - 98%)    Parameters below as of 09 Aug 2022 12:00  Patient On (Oxygen Delivery Method): room air        MEDICATIONS    acetaminophen    Suspension .. 650 milliGRAM(s) Oral every 6 hours PRN  ALBUTerol    90 MICROgram(s) HFA Inhaler 2 Puff(s) Inhalation every 6 hours PRN  albuterol/ipratropium for Nebulization 3 milliLiter(s) Nebulizer every 6 hours PRN  AQUAPHOR (petrolatum Ointment) 1 Application(s) Topical two times a day  aspirin  chewable 81 milliGRAM(s) Oral daily  atorvastatin 20 milliGRAM(s) Oral at bedtime  chlorhexidine 2% Cloths 1 Application(s) Topical daily  doxazosin 2 milliGRAM(s) Oral at bedtime  enoxaparin Injectable 40 milliGRAM(s) SubCutaneous every 24 hours  melatonin 3 milliGRAM(s) Oral at bedtime  metoprolol tartrate 25 milliGRAM(s) Oral two times a day  multivitamin 1 Tablet(s) Oral daily  piperacillin/tazobactam IVPB.. 3.375 Gram(s) IV Intermittent every 8 hours  polyethylene glycol 3350 17 Gram(s) Oral daily  senna 2 Tablet(s) Oral at bedtime  sodium chloride 2 Gram(s) Oral three times a day         LABS:  CBC Full  -  ( 09 Aug 2022 05:32 )  WBC Count : 7.89 K/uL  RBC Count : 3.05 M/uL  Hemoglobin : 9.3 g/dL  Hematocrit : 28.0 %  Platelet Count - Automated : 154 K/uL  Mean Cell Volume : 91.8 fl  Mean Cell Hemoglobin : 30.5 pg  Mean Cell Hemoglobin Concentration : 33.2 gm/dL  Auto Neutrophil # : x  Auto Lymphocyte # : x  Auto Monocyte # : x  Auto Eosinophil # : x  Auto Basophil # : x  Auto Neutrophil % : x  Auto Lymphocyte % : x  Auto Monocyte % : x  Auto Eosinophil % : x  Auto Basophil % : x      08-09    142  |  107  |  13.5  ----------------------------<  138<H>  3.7   |  26.0  |  0.46<L>    Ca    8.1<L>      09 Aug 2022 05:32  Phos  2.0     08-09  Mg     1.9     08-09        On Neurological Examination:    Mental Status - Patient is  awake  Verbalizing a few words. Followed occasional command. ( open and close your mouth)  Given choiced said yes to her name correctly..  Cranial Nerves - PERRL, EOM--  Left Gaze deviation has resolved and can look to her right as well.  Visual fields  Blinked to Threat on the left and not on right. Has a right central facial weakness.    Motor Exam -   Right upper extremity 0/5  R LE is 1/5.  Left upper  and lower moves briskly.    Sensory    percieves pain bilaterally.    Coord: FTN - not testable.  Gait -  Not assessed.        RADIOLOGY ( All neurological imaging studies were independently reviewed and interpreted by me)  CT   CTA  CTP      CT Head No Cont (08.05.22 @ 14:36) >    IMPRESSION: Acute left ADRYAN, left MCA, & right PCA territory infarctions.   No evidence of hemorrhagic transformation.    GITA TAMAYO MD; Attending Radiologist      MRI:  TTE    TTE Echo Complete w/ Contrast w/ Doppler (08.05.22 @ 09:45) >  Summary:   1. Technically difficult study.   2. Hyperdynamic global left ventricular systolic function.   3. Left ventricular ejection fraction, by visual estimation, is 70 to   75%.   4. Spectral Doppler shows pseudonormal pattern of left ventricular   myocardial filling (Grade II diastolic dysfunction).   5. Normal right ventricular size and function.   6. The left atrium is normal in size.   7. Mild to moderate aortic valve stenosis.   8. Moderate aortic regurgitation.   9. Mild thickening of the anterior and posterior mitral valve leaflets.  10. Mild mitral annular calcification.  11. Mild to moderate mitral valve regurgitation.  12. Moderate tricuspid regurgitation.  13. Estimated pulmonary artery systolic pressure is 43.4 mmHg assuming a   right atrial pressureof 3 mmHg, which is consistent with mild pulmonary   hypertension.  14. There is no evidence of pericardial effusion.    MD Nakia Electronically signed on 8/5/2022 at 1:51:31 PM     IR Neuro (08.04.22 @ 19:47) >    IMPRESSION:  Supervision and Interpretation:  1. Complete occlusion of the M1 segment of the left middle cerebral   artery at its origin.    2. Successful complete TICI3 revascularization of the left middle   cerebral artery utilizing direct contact aspiration.     US Duplex Venous Lower Ext Complete, Bilateral (08.06.22 @ 10:33) >  IMPRESSION:  No evidence of deep venous thrombosis in either lower extremity.          --- End of Report ---            NORA WILKINS MD; Attending Radiologist    < end of copied text >

## 2022-08-09 NOTE — PROGRESS NOTE ADULT - ASSESSMENT
Assessment  88F hx HTN, HLD and breast cancer in remission, mRS 0 at baseline, admitted 8/5 with R sided weakness and aphasia, NIHSS 23, found with L MCA m1 occlusion s/p tPA & M.T. TICI 3 recanalization.   - Post-thrombectomy day 5  - Failed swallow eval again       Plan:   - Q4 hour neuro checks   - SBP goal 100-150 s/p thrombectomy. Continue home dose lopressor  - MRI pending   - TTE performed: EF 70-75%, GII DD, mod AS, mod AR, mild MR, mod TR, mild pulm HTN   - EP consulted, possible ILR pending GOC discussions   - A1C 5.6%  - LDL 72, on statin   - DVT prophylaxis; SCDs, lovenox 40   - Continue to work with speech therapy, also GOC with family 2/2 possible PEG   - Chest PT/ pulm toileting/ Incentive spirometry PRN  - Palliative care following   - Further care per medicine team  - If further neuro IR needs please re-call

## 2022-08-09 NOTE — PROGRESS NOTE ADULT - ASSESSMENT
88F with remote breast cancer history, HTN, HLD, transferred from [place] with acute stroke s/p TPA, mechanical thrombectomy, now extubated, with dysphagia, being transferred to medical service.     #1 Acute stroke, s/p TPA, mechnical thrombectomy  - CT as above - actue left ADRYAN, left MCA, and right PCA with suspicions for cardioembolic sources  - workup in progress per medical team  - ASA/statin  - MRI pending  - cardiology consult     #2 acute respiratory failure  - extubated  - on nasal cannula  - being treated for possible superimposed aspiration related pneumonia    #3 Dysphagia  - failed speech and swallow 8/8   - NG tube trial feeds  - will see how she does and aid in further discussions regarding dysphagia/feeding tube if needed    #4 Encounter for palliative care  - will continue to follow course  - DNR and DNI  - tube feeds via NG tube for now and repeat swallow testing if improves clinically, then will plan further discussion regarding PEG   - awaiting MRI also

## 2022-08-09 NOTE — PROGRESS NOTE ADULT - SUBJECTIVE AND OBJECTIVE BOX
HPI  Pt is a 89yo F admitted to [facility] for CVA s/p thrombectomy.   Pt was seen and examined at bedside. Pt is not able to answer any question.     Vital Signs Last 24 Hrs  T(C): 37.4 (09 Aug 2022 12:13), Max: 37.4 (09 Aug 2022 12:13)  T(F): 99.3 (09 Aug 2022 12:13), Max: 99.3 (09 Aug 2022 12:13)  HR: 86 (09 Aug 2022 16:00) (73 - 95)  BP: 124/74 (09 Aug 2022 16:00) (101/56 - 143/54)  BP(mean): 87 (09 Aug 2022 06:00) (70 - 89)  RR: 18 (09 Aug 2022 16:00) (16 - 24)  SpO2: 96% (09 Aug 2022 16:00) (94% - 96%)    Parameters below as of 09 Aug 2022 16:00  Patient On (Oxygen Delivery Method): room air        I&O's Summary    08 Aug 2022 07:01  -  09 Aug 2022 07:00  --------------------------------------------------------  IN: 1485 mL / OUT: 470 mL / NET: 1015 mL        CAPILLARY BLOOD GLUCOSE          PHYSICAL EXAM:    Constitutional: NAD,   HEENT: PERR, EOMI,   Neck: Soft and supple,   Respiratory: Breath sounds are clear bilaterally,    Cardiovascular: S1 and S2,   Gastrointestinal: Bowel Sounds present, soft, nontender,   Extremities: No peripheral edema  Vascular: 2+ peripheral pulses  Neurological: A/O x 0    MEDICATIONS:  MEDICATIONS  (STANDING):  AQUAPHOR (petrolatum Ointment) 1 Application(s) Topical two times a day  aspirin  chewable 81 milliGRAM(s) Oral daily  atorvastatin 20 milliGRAM(s) Oral at bedtime  chlorhexidine 2% Cloths 1 Application(s) Topical daily  doxazosin 2 milliGRAM(s) Oral at bedtime  enoxaparin Injectable 40 milliGRAM(s) SubCutaneous every 24 hours  melatonin 3 milliGRAM(s) Oral at bedtime  metoprolol tartrate 25 milliGRAM(s) Oral two times a day  multivitamin 1 Tablet(s) Oral daily  piperacillin/tazobactam IVPB.. 3.375 Gram(s) IV Intermittent every 8 hours  polyethylene glycol 3350 17 Gram(s) Oral daily  senna 2 Tablet(s) Oral at bedtime  sodium chloride 2 Gram(s) Oral three times a day      LABS: All Labs Reviewed:                        9.3    7.89  )-----------( 154      ( 09 Aug 2022 05:32 )             28.0     08-09    142  |  107  |  13.5  ----------------------------<  138<H>  3.7   |  26.0  |  0.46<L>    Ca    8.1<L>      09 Aug 2022 05:32  Phos  2.0     08-09  Mg     1.9     08-09        CARDIAC MARKERS ( 08 Aug 2022 00:06 )  x     / 0.02 ng/mL / x     / x     / x          Blood Culture: 08-07 @ 11:58  Organism --  Gram Stain Blood -- Gram Stain   Numerous polymorphonuclear leukocytes per low power field  Few Squamous epithelial cells per low power field  Few Gram positive cocci in pairs per oil power field  Rare Gram Variable Rods per oil power field  Specimen Source .Sputum Sputum  Culture-Blood --        RADIOLOGY/EKG:    DVT PPX:    ADVANCED DIRECTIVE:    DISPOSITION:

## 2022-08-09 NOTE — PROGRESS NOTE ADULT - ASSESSMENT
IMPRESSION:  - S/P IV tPA and Thrombectomy for L Proximal MCA occlusion with TICI 3 reperfusion on 8-04-22.    Now has L MCA, L ADRYAN and R PCA infarcts.      ASSESSMENT/ PLAN:     - Neuro checks and vital signs Q 4 hours.  - SBP goal keep Normotensive.  - ASA 81 mg PO or 300 SC QD.  - Lipitor for LDL goal of < 70 .  - Telemetry monitoring.  - CT/CTA/CTP -images and reports were reviewed.   - TTE  -Reports as above were reviewed.   - PT OT SLP evaluation.  - Goals of care discussions.  - SCD/ SQ Lovenox for DVT prophylaxis.         IMPRESSION:  - S/P IV tPA and Thrombectomy for L Proximal MCA occlusion with TICI 3 reperfusion on 8-04-22.    Now has L MCA, L ADRYAN and R PCA infarcts.      ASSESSMENT/ PLAN:     - Neuro checks and vital signs Q 4 hours.  - SBP goal keep Normotensive.  - ASA 81 mg PO or 300 [place] QD.  - Lipitor for LDL goal of < 70 .  - Telemetry monitoring.  - CT/CTA/CTP -images and reports were reviewed.   - Awaiting MRI Brain .  - TTE  -Reports as above were reviewed.   - PT OT SLP evaluation.  - Goals of care discussions.  - SCD/ SQ Lovenox for DVT prophylaxis.

## 2022-08-10 LAB
ANION GAP SERPL CALC-SCNC: 11 MMOL/L — SIGNIFICANT CHANGE UP (ref 5–17)
BUN SERPL-MCNC: 11.6 MG/DL — SIGNIFICANT CHANGE UP (ref 8–20)
BUN SERPL-MCNC: 12.5 MG/DL — SIGNIFICANT CHANGE UP (ref 8–20)
CALCIUM SERPL-MCNC: 7.9 MG/DL — LOW (ref 8.4–10.5)
CALCIUM SERPL-MCNC: 8.3 MG/DL — LOW (ref 8.4–10.5)
CHLORIDE SERPL-SCNC: 106 MMOL/L — SIGNIFICANT CHANGE UP (ref 98–107)
CHLORIDE SERPL-SCNC: 106 MMOL/L — SIGNIFICANT CHANGE UP (ref 98–107)
CO2 SERPL-SCNC: 22 MMOL/L — SIGNIFICANT CHANGE UP (ref 22–29)
CO2 SERPL-SCNC: 23 MMOL/L — SIGNIFICANT CHANGE UP (ref 22–29)
CREAT SERPL-MCNC: 0.43 MG/DL — LOW (ref 0.5–1.3)
CREAT SERPL-MCNC: 0.48 MG/DL — LOW (ref 0.5–1.3)
EGFR: 91 ML/MIN/1.73M2 — SIGNIFICANT CHANGE UP
EGFR: 93 ML/MIN/1.73M2 — SIGNIFICANT CHANGE UP
GLUCOSE SERPL-MCNC: 147 MG/DL — HIGH (ref 70–99)
GLUCOSE SERPL-MCNC: 152 MG/DL — HIGH (ref 70–99)
HCT VFR BLD CALC: 27.1 % — LOW (ref 34.5–45)
HGB BLD-MCNC: 9.2 G/DL — LOW (ref 11.5–15.5)
MAGNESIUM SERPL-MCNC: 1.8 MG/DL — SIGNIFICANT CHANGE UP (ref 1.6–2.6)
MAGNESIUM SERPL-MCNC: 1.9 MG/DL — SIGNIFICANT CHANGE UP (ref 1.6–2.6)
MCHC RBC-ENTMCNC: 31 PG — SIGNIFICANT CHANGE UP (ref 27–34)
MCHC RBC-ENTMCNC: 33.9 GM/DL — SIGNIFICANT CHANGE UP (ref 32–36)
MCV RBC AUTO: 91.2 FL — SIGNIFICANT CHANGE UP (ref 80–100)
PHOSPHATE SERPL-MCNC: 2.7 MG/DL — SIGNIFICANT CHANGE UP (ref 2.4–4.7)
PHOSPHATE SERPL-MCNC: 3.2 MG/DL — SIGNIFICANT CHANGE UP (ref 2.4–4.7)
PLATELET # BLD AUTO: 166 K/UL — SIGNIFICANT CHANGE UP (ref 150–400)
POTASSIUM SERPL-MCNC: 3.4 MMOL/L — LOW (ref 3.5–5.3)
POTASSIUM SERPL-MCNC: 3.6 MMOL/L — SIGNIFICANT CHANGE UP (ref 3.5–5.3)
POTASSIUM SERPL-SCNC: 3.4 MMOL/L — LOW (ref 3.5–5.3)
POTASSIUM SERPL-SCNC: 3.6 MMOL/L — SIGNIFICANT CHANGE UP (ref 3.5–5.3)
RBC # BLD: 2.97 M/UL — LOW (ref 3.8–5.2)
RBC # FLD: 14.3 % — SIGNIFICANT CHANGE UP (ref 10.3–14.5)
SODIUM SERPL-SCNC: 140 MMOL/L — SIGNIFICANT CHANGE UP (ref 135–145)
SODIUM SERPL-SCNC: 140 MMOL/L — SIGNIFICANT CHANGE UP (ref 135–145)
WBC # BLD: 6.35 K/UL — SIGNIFICANT CHANGE UP (ref 3.8–10.5)
WBC # FLD AUTO: 6.35 K/UL — SIGNIFICANT CHANGE UP (ref 3.8–10.5)

## 2022-08-10 PROCEDURE — 99233 SBSQ HOSP IP/OBS HIGH 50: CPT | Mod: FS

## 2022-08-10 PROCEDURE — 71045 X-RAY EXAM CHEST 1 VIEW: CPT | Mod: 26

## 2022-08-10 PROCEDURE — 99232 SBSQ HOSP IP/OBS MODERATE 35: CPT

## 2022-08-10 PROCEDURE — 99233 SBSQ HOSP IP/OBS HIGH 50: CPT

## 2022-08-10 PROCEDURE — 93010 ELECTROCARDIOGRAM REPORT: CPT

## 2022-08-10 PROCEDURE — 99497 ADVNCD CARE PLAN 30 MIN: CPT | Mod: 25

## 2022-08-10 RX ORDER — SODIUM CHLORIDE 9 MG/ML
500 INJECTION INTRAMUSCULAR; INTRAVENOUS; SUBCUTANEOUS ONCE
Refills: 0 | Status: COMPLETED | OUTPATIENT
Start: 2022-08-10 | End: 2022-08-10

## 2022-08-10 RX ORDER — POTASSIUM CHLORIDE 20 MEQ
40 PACKET (EA) ORAL ONCE
Refills: 0 | Status: DISCONTINUED | OUTPATIENT
Start: 2022-08-10 | End: 2022-08-10

## 2022-08-10 RX ORDER — METOPROLOL TARTRATE 50 MG
5 TABLET ORAL ONCE
Refills: 0 | Status: COMPLETED | OUTPATIENT
Start: 2022-08-10 | End: 2022-08-10

## 2022-08-10 RX ORDER — DILTIAZEM HCL 120 MG
10 CAPSULE, EXT RELEASE 24 HR ORAL ONCE
Refills: 0 | Status: COMPLETED | OUTPATIENT
Start: 2022-08-10 | End: 2022-08-10

## 2022-08-10 RX ORDER — POTASSIUM CHLORIDE 20 MEQ
40 PACKET (EA) ORAL EVERY 4 HOURS
Refills: 0 | Status: COMPLETED | OUTPATIENT
Start: 2022-08-10 | End: 2022-08-11

## 2022-08-10 RX ORDER — MAGNESIUM SULFATE 500 MG/ML
2 VIAL (ML) INJECTION ONCE
Refills: 0 | Status: COMPLETED | OUTPATIENT
Start: 2022-08-10 | End: 2022-08-10

## 2022-08-10 RX ADMIN — Medication 650 MILLIGRAM(S): at 17:02

## 2022-08-10 RX ADMIN — PIPERACILLIN AND TAZOBACTAM 25 GRAM(S): 4; .5 INJECTION, POWDER, LYOPHILIZED, FOR SOLUTION INTRAVENOUS at 21:18

## 2022-08-10 RX ADMIN — Medication 40 MILLIEQUIVALENT(S): at 23:37

## 2022-08-10 RX ADMIN — Medication 3 MILLIGRAM(S): at 21:15

## 2022-08-10 RX ADMIN — Medication 1 APPLICATION(S): at 17:03

## 2022-08-10 RX ADMIN — Medication 650 MILLIGRAM(S): at 18:00

## 2022-08-10 RX ADMIN — PIPERACILLIN AND TAZOBACTAM 25 GRAM(S): 4; .5 INJECTION, POWDER, LYOPHILIZED, FOR SOLUTION INTRAVENOUS at 11:48

## 2022-08-10 RX ADMIN — CHLORHEXIDINE GLUCONATE 1 APPLICATION(S): 213 SOLUTION TOPICAL at 11:48

## 2022-08-10 RX ADMIN — ATORVASTATIN CALCIUM 20 MILLIGRAM(S): 80 TABLET, FILM COATED ORAL at 21:16

## 2022-08-10 RX ADMIN — SODIUM CHLORIDE 2 GRAM(S): 9 INJECTION INTRAMUSCULAR; INTRAVENOUS; SUBCUTANEOUS at 21:16

## 2022-08-10 RX ADMIN — Medication 5 MILLIGRAM(S): at 22:05

## 2022-08-10 RX ADMIN — Medication 25 MILLIGRAM(S): at 17:03

## 2022-08-10 RX ADMIN — SODIUM CHLORIDE 500 MILLILITER(S): 9 INJECTION INTRAMUSCULAR; INTRAVENOUS; SUBCUTANEOUS at 23:37

## 2022-08-10 RX ADMIN — Medication 25 MILLIGRAM(S): at 05:13

## 2022-08-10 RX ADMIN — Medication 1 TABLET(S): at 08:58

## 2022-08-10 RX ADMIN — Medication 1 APPLICATION(S): at 05:12

## 2022-08-10 RX ADMIN — Medication 25 GRAM(S): at 23:36

## 2022-08-10 RX ADMIN — PIPERACILLIN AND TAZOBACTAM 25 GRAM(S): 4; .5 INJECTION, POWDER, LYOPHILIZED, FOR SOLUTION INTRAVENOUS at 05:11

## 2022-08-10 RX ADMIN — Medication 10 MILLIGRAM(S): at 23:34

## 2022-08-10 RX ADMIN — SODIUM CHLORIDE 2 GRAM(S): 9 INJECTION INTRAMUSCULAR; INTRAVENOUS; SUBCUTANEOUS at 05:13

## 2022-08-10 RX ADMIN — ENOXAPARIN SODIUM 40 MILLIGRAM(S): 100 INJECTION SUBCUTANEOUS at 17:03

## 2022-08-10 RX ADMIN — Medication 2 MILLIGRAM(S): at 21:16

## 2022-08-10 RX ADMIN — Medication 81 MILLIGRAM(S): at 08:58

## 2022-08-10 RX ADMIN — SODIUM CHLORIDE 2 GRAM(S): 9 INJECTION INTRAMUSCULAR; INTRAVENOUS; SUBCUTANEOUS at 11:47

## 2022-08-10 NOTE — PROGRESS NOTE ADULT - SUBJECTIVE AND OBJECTIVE BOX
Patient is a 88y old  Female who presents with a chief complaint of Stroke (10 Aug 2022 11:37)    Patient seen and examined at bedside.     ALLERGIES:  No Known Allergies    MEDICATIONS  (STANDING):  AQUAPHOR (petrolatum Ointment) 1 Application(s) Topical two times a day  aspirin  chewable 81 milliGRAM(s) Oral daily  atorvastatin 20 milliGRAM(s) Oral at bedtime  chlorhexidine 2% Cloths 1 Application(s) Topical daily  doxazosin 2 milliGRAM(s) Oral at bedtime  enoxaparin Injectable 40 milliGRAM(s) SubCutaneous every 24 hours  melatonin 3 milliGRAM(s) Oral at bedtime  metoprolol tartrate 25 milliGRAM(s) Oral two times a day  multivitamin 1 Tablet(s) Oral daily  piperacillin/tazobactam IVPB.. 3.375 Gram(s) IV Intermittent every 8 hours  polyethylene glycol 3350 17 Gram(s) Oral daily  senna 2 Tablet(s) Oral at bedtime  sodium chloride 2 Gram(s) Oral three times a day    MEDICATIONS  (PRN):  acetaminophen    Suspension .. 650 milliGRAM(s) Oral every 6 hours PRN Temp greater or equal to 38C (100.4F), Mild Pain (1 - 3)  ALBUTerol    90 MICROgram(s) HFA Inhaler 2 Puff(s) Inhalation every 6 hours PRN Bronchospasm  albuterol/ipratropium for Nebulization 3 milliLiter(s) Nebulizer every 6 hours PRN Shortness of Breath and/or Wheezing    Vital Signs Last 24 Hrs  T(F): 98 (10 Aug 2022 11:20), Max: 99.1 (09 Aug 2022 20:00)  HR: 93 (10 Aug 2022 11:20) (78 - 106)  BP: 136/74 (10 Aug 2022 11:20) (124/74 - 145/74)  RR: 20 (10 Aug 2022 11:20) (18 - 22)  SpO2: 93% (10 Aug 2022 11:20) (92% - 96%)  I&O's Summary    10 Aug 2022 07:01  -  10 Aug 2022 13:15  --------------------------------------------------------  IN: 0 mL / OUT: 850 mL / NET: -850 mL      PHYSICAL EXAM:  General: NAD, laying in bed +garbled speech +ng tube  ENT: MMM, no thrush  Neck: Supple, No JVD  Lungs: Clear to auscultation bilaterally, good air entry, non-labored breathing  Cardio: +s1/s2  Abdomen: Soft, Nontender, Nondistended; Bowel sounds present  Extremities: No calf tenderness, No pitting edema    LABS:                        9.2    6.35  )-----------( 166      ( 10 Aug 2022 06:12 )             27.1     08-10    140  |  106  |  12.5  ----------------------------<  152  3.4   |  22.0  |  0.48    Ca    7.9      10 Aug 2022 06:12  Phos  2.7     08-10  Mg     1.9     08-10    08-05 Chol 154 mg/dL LDL -- HDL 65 mg/dL Trig 86 mg/dL    Cultures  Culture - Sputum (collected 07 Aug 2022 11:58)  Source: .Sputum Sputum  Gram Stain (07 Aug 2022 23:18):    Numerous polymorphonuclear leukocytes per low power field    Few Squamous epithelial cells per low power field    Few Gram positive cocci in pairs per oil power field    Rare Gram Variable Rods per oil power field  Final Report (09 Aug 2022 15:53):    Normal Respiratory Tiera present    RADIOLOGY & ADDITIONAL TESTS:  - no new tests    Care Discussed with Consultants/Other Providers:   palliative

## 2022-08-10 NOTE — PROGRESS NOTE ADULT - NS ATTEND AMEND GEN_ALL_CORE FT
cerebrovascular accident . Ep consulted for evaln for atrial fibrillation .   EP consult appreciated.   outpaitent 4 weekgs MCOT monitor.  [facility] Out [facility] Chair . PT OT.

## 2022-08-10 NOTE — PROGRESS NOTE ADULT - SUBJECTIVE AND OBJECTIVE BOX
[facility] PHYSICIAN [facility]NERS                                                         CARDIOLOGY AT [facility]                                                                  [address], [place] [address]                                                         Telephone: [phone]. Fax:[phone]                                                                             PROGRESS NOTE    Reason for follow up: pod 6 L MCA thrombectomy  Update: patient still with right sided weakness      Review of symptoms:   Cardiac:  No chest pain. No dyspnea. No palpitations.  Respiratory: no cough. No dyspnea  Gastrointestinal: No diarrhea. No abdominal pain. No bleeding.   Neuro: No focal neuro complaints.    Vitals:  T(C): 36.7 (08-10-22 @ 11:20), Max: 37.3 (08-09-22 @ 20:00)  HR: 93 (08-10-22 @ 11:20) (78 - 106)  BP: 136/74 (08-10-22 @ 11:20) (124/74 - 145/74)  RR: 20 (08-10-22 @ 11:20) (18 - 22)  SpO2: 93% (08-10-22 @ 11:20) (92% - 96%)  Wt(kg): --  I&O's Summary    10 Aug 2022 07:01  -  10 Aug 2022 13:12  --------------------------------------------------------  IN: 0 mL / OUT: 850 mL / NET: -850 mL          PHYSICAL EXAM:  Appearance: Comfortable. No acute distress  HEENT:  Atraumatic. Normocephalic.  Normal oral mucosa  Neurologic: A & O , right sided facial droop, weak RUE/RLE  Cardiovascular: RRR S1 S2, No murmur, no rubs/gallops. No JVD  Respiratory: Lungs clear to auscultation, unlabored   Gastrointestinal:  Soft, Non-tender, + BS  Lower Extremities: 2+ Peripheral Pulses, No clubbing, cyanosis, or edema  Psychiatry: Patient is calm. No agitation.   Skin: warm and dry.    CURRENT CARDIAC MEDICATIONS:  doxazosin 2 milliGRAM(s) Oral at bedtime  metoprolol tartrate 25 milliGRAM(s) Oral two times a day      CURRENT OTHER MEDICATIONS:  ALBUTerol    90 MICROgram(s) HFA Inhaler 2 Puff(s) Inhalation every 6 hours PRN Bronchospasm  albuterol/ipratropium for Nebulization 3 milliLiter(s) Nebulizer every 6 hours PRN Shortness of Breath and/or Wheezing  piperacillin/tazobactam IVPB.. 3.375 Gram(s) IV Intermittent every 8 hours  acetaminophen    Suspension .. 650 milliGRAM(s) Oral every 6 hours PRN Temp greater or equal to 38C (100.4F), Mild Pain (1 - 3)  melatonin 3 milliGRAM(s) Oral at bedtime  polyethylene glycol 3350 17 Gram(s) Oral daily  senna 2 Tablet(s) Oral at bedtime  atorvastatin 20 milliGRAM(s) Oral at bedtime  AQUAPHOR (petrolatum Ointment) 1 Application(s) Topical two times a day  aspirin  chewable 81 milliGRAM(s) Oral daily  chlorhexidine 2% Cloths 1 Application(s) Topical daily  enoxaparin Injectable 40 milliGRAM(s) SubCutaneous every 24 hours  multivitamin 1 Tablet(s) Oral daily  sodium chloride 2 Gram(s) Oral three times a day      LABS:	 	  ( 08 Aug 2022 00:06 )  Troponin T  0.02 ,  CPK  X    , CKMB  X    , BNP X                                  9.2    6.35  )-----------( 166      ( 10 Aug 2022 06:12 )             27.1     08-10    140  |  106  |  12.5  ----------------------------<  152<H>  3.4<L>   |  22.0  |  0.48<L>    Ca    7.9<L>      10 Aug 2022 06:12  Phos  2.7     08-10  Mg     1.9     08-10      PT/INR/PTT ( 05 Aug 2022 03:20 )                       :                       :      16.8         :       29.2                  .        .                   .              .           .       1.44        .                                       Lipid Profile: Date: 08-05 @ 03:20  Total cholesterol 154; Direct LDL: --; HDL: 65; Triglycerides:86    HgA1c:   TSH: Thyroid Stimulating Hormone, Serum: 2.41 uIU/mL      TELEMETRY:   ECG:    DIAGNOSTIC TESTING:  [ ] Echocardiogram:   [ ]  Catheterization:  [ ] Stress Test:    OTHER:

## 2022-08-10 NOTE — PROGRESS NOTE ADULT - PROBLEM SELECTOR PLAN 1
.  - POD 6 from mechanical thrombectomy of L MCA occlusion  - still with right facial droop, more movement in RLE and RUE today  - speech remains garbled  - continue ASA / statin  - with NGT until swallow eval   - Neurochecks q4h per neuro reccs  -no ILR per EP, if ILR recommended would need NAVIN prior to implantation

## 2022-08-10 NOTE — PROGRESS NOTE ADULT - ASSESSMENT
88F with remote breast cancer history, HTN, HLD, transferred from [place] with acute stroke s/p TPA, mechanical thrombectomy, now extubated, with dysphagia, being transferred to medical service.     #1 Acute stroke, s/p TPA, mechnical thrombectomy  - CT as above - acute left ADRYAN, left MCA, and right PCA with suspicions for cardioembolic sources  - workup in progress per medical team  - ASA/statin  - MRI pending  - cardiology consult     #2 acute respiratory failure  - extubated 8/5   - on nasal cannula  - being treated for possible superimposed aspiration related pneumonia    #3 Dysphagia  - failed speech and swallow 8/8 and 8/9 - but eval on 8/9 seemed a bit improved   - NG tube trial feeds  - will see how she does and aid in further discussions regarding dysphagia/feeding tube if needed    #4 Encounter for palliative care  - continuing to follow course  - will reach out to family today - Nikole Siddiqui

## 2022-08-10 NOTE — PROGRESS NOTE ADULT - SUBJECTIVE AND OBJECTIVE BOX
Neurology   JAISON MAXINE 88y Female     HPI:  88F last known well at 2:15pm, found around 330pm to have aphasia and right sided weakness. Presents to [place] as code stroke found to have L MCA occlusion. Given TPA at 4:05pm and intubated for airway protection. Transferred to [facility] for mechanical thrombectomy, NIH 23, MRS 0 (04 Aug 2022 18:45)  She is - S/P IV tPA and Thrombectomy for L Proximal MCA occlusion with TICI 3 reperfusion on 8-04-22.  PMH: HTN (hypertension)    Hypercholesteremia    Breast cancer         PSH: No significant past surgical history          FAMILY HISTORY:  No pertinent family history in first degree relatives        SOCIAL HISTORY:  No history of tobacco or alcohol use     Allergies    No Known Allergies    Intolerances            Vital Signs Last 24 Hrs  T(C): 36.7 (10 Aug 2022 11:20), Max: 37.4 (09 Aug 2022 12:13)  T(F): 98 (10 Aug 2022 11:20), Max: 99.3 (09 Aug 2022 12:13)  HR: 93 (10 Aug 2022 11:20) (78 - 106)  BP: 136/74 (10 Aug 2022 11:20) (124/74 - 145/74)  BP(mean): --  RR: 20 (10 Aug 2022 11:20) (18 - 22)  SpO2: 93% (10 Aug 2022 11:20) (92% - 96%)    Parameters below as of 10 Aug 2022 11:20  Patient On (Oxygen Delivery Method): room air        MEDICATIONS    acetaminophen    Suspension .. 650 milliGRAM(s) Oral every 6 hours PRN  ALBUTerol    90 MICROgram(s) HFA Inhaler 2 Puff(s) Inhalation every 6 hours PRN  albuterol/ipratropium for Nebulization 3 milliLiter(s) Nebulizer every 6 hours PRN  AQUAPHOR (petrolatum Ointment) 1 Application(s) Topical two times a day  aspirin  chewable 81 milliGRAM(s) Oral daily  atorvastatin 20 milliGRAM(s) Oral at bedtime  chlorhexidine 2% Cloths 1 Application(s) Topical daily  doxazosin 2 milliGRAM(s) Oral at bedtime  enoxaparin Injectable 40 milliGRAM(s) SubCutaneous every 24 hours  melatonin 3 milliGRAM(s) Oral at bedtime  metoprolol tartrate 25 milliGRAM(s) Oral two times a day  multivitamin 1 Tablet(s) Oral daily  piperacillin/tazobactam IVPB.. 3.375 Gram(s) IV Intermittent every 8 hours  polyethylene glycol 3350 17 Gram(s) Oral daily  senna 2 Tablet(s) Oral at bedtime  sodium chloride 2 Gram(s) Oral three times a day         LABS:  CBC Full  -  ( 10 Aug 2022 06:12 )  WBC Count : 6.35 K/uL  RBC Count : 2.97 M/uL  Hemoglobin : 9.2 g/dL  Hematocrit : 27.1 %  Platelet Count - Automated : 166 K/uL  Mean Cell Volume : 91.2 fl  Mean Cell Hemoglobin : 31.0 pg  Mean Cell Hemoglobin Concentration : 33.9 gm/dL  Auto Neutrophil # : x  Auto Lymphocyte # : x  Auto Monocyte # : x  Auto Eosinophil # : x  Auto Basophil # : x  Auto Neutrophil % : x  Auto Lymphocyte % : x  Auto Monocyte % : x  Auto Eosinophil % : x  Auto Basophil % : x      08-10    140  |  106  |  12.5  ----------------------------<  152<H>  3.4<L>   |  22.0  |  0.48<L>    Ca    7.9<L>      10 Aug 2022 06:12  Phos  2.7     08-10  Mg     1.9     08-10        On Neurological Examination:    Mental Status - Patient is  awake  Verbalizing a few words. Followed occasional command. ( open and close your mouth)    Cranial Nerves - PERRL, EOMs are conjugate and full.  Visual fields  Blinked to Threat on the left and not on right. Has a right central facial weakness.    Motor Exam -   Right upper extremity 1/5  R LE is 2/5.  Left upper  and lower moves briskly.    Sensory    perceives pain bilaterally.    Coord: FTN - not testable.  Gait -  Not assessed.        RADIOLOGY ( All neurological imaging studies were independently reviewed and interpreted by me)  CTH   CTA  CTP      CT Head No Cont (08.05.22 @ 14:36) >    IMPRESSION: Acute left ADRYAN, left MCA, & right PCA territory infarctions.   No evidence of hemorrhagic transformation.    GITA TAMAYO MD; Attending Radiologist      MRI:  TTE    TTE Echo Complete w/ Contrast w/ Doppler (08.05.22 @ 09:45) >  Summary:   1. Technically difficult study.   2. Hyperdynamic global left ventricular systolic function.   3. Left ventricular ejection fraction, by visual estimation, is 70 to   75%.   4. Spectral Doppler shows pseudonormal pattern of left ventricular   myocardial filling (Grade II diastolic dysfunction).   5. Normal right ventricular size and function.   6. The left atrium is normal in size.   7. Mild to moderate aortic valve stenosis.   8. Moderate aortic regurgitation.   9. Mild thickening of the anterior and posterior mitral valve leaflets.  10. Mild mitral annular calcification.  11. Mild to moderate mitral valve regurgitation.  12. Moderate tricuspid regurgitation.  13. Estimated pulmonary artery systolic pressure is 43.4 mmHg assuming a   right atrial pressureof 3 mmHg, which is consistent with mild pulmonary   hypertension.  14. There is no evidence of pericardial effusion.    MD Nakia Electronically signed on 8/5/2022 at 1:51:31 PM     IR Neuro (08.04.22 @ 19:47) >    IMPRESSION:  Supervision and Interpretation:  1. Complete occlusion of the M1 segment of the left middle cerebral   artery at its origin.    2. Successful complete TICI3 revascularization of the left middle   cerebral artery utilizing direct contact aspiration.     US Duplex Venous Lower Ext Complete, Bilateral (08.06.22 @ 10:33) >  IMPRESSION:  No evidence of deep venous thrombosis in either lower extremity.        NORA WILKINS MD; Attending Radiologist

## 2022-08-10 NOTE — PROGRESS NOTE ADULT - ASSESSMENT
IMPRESSION:  - S/P IV tPA and Thrombectomy for L Proximal MCA occlusion with TICI 3 reperfusion on 8-04-22.    Now has L MCA, L ADRYAN and R PCA infarcts.      ASSESSMENT/ PLAN:     - Neuro checks and vital signs Q 4 hours.  - SBP goal keep Normotensive.  - ASA 81 mg PO or 300 GA QD.  - Lipitor for LDL goal of < 70 .  - Telemetry monitoring.  - CT/CTA/CTP -images and reports were reviewed.   - Awaiting MRI Brain .  - TTE  -Reports as above were reviewed.   - PT OT SLP following.  - Goals of care discussions.  - SCD/ SQ Lovenox for DVT prophylaxis.

## 2022-08-10 NOTE — PROGRESS NOTE ADULT - ASSESSMENT
88F remote hx breast CA, HTN, HLD transferred from [facility] for Acute Stroke (L MCA M1 occlusion s/p TPA and M.T. TICI 3 recanalization) s/p extubation on 8/5 ccb Acute Hypoxic Resp failure sec to aspiration pna started on IV zosyn on 8/7 and dysphagia/poor mental status requiring NGT placement.     #Acute CVA   - s/p tpa s/p mechanical thrombectomy   - neuo consult appreciated  - cardio consult appreciated  - aspirin, statin  - mr pending  - palliative care consult appreciated    #Acute Hypoxic Respiratory Failure  - 2/2 aspiration pneumonia  - zosyn  - aspiration precautions    #Dysphagia   - feeds via ng tube  - speech follow up  - palliative care consult appreciated    #HTN- Essential  - metoprolol, cardura  - monitor blood pressure    #Chronic Diastolic Heart Failure  - echo lvef 70% grade 2 diastolic dysfunction  - metoprolol  - cardio consult appreciated  - EP cardio consule appreciated- may need ILR on d/c    #HLD  - statin     #DVT Prophylaxis  - lovenox SC    called and spoke to patient daughter [phone]. all questions answered

## 2022-08-10 NOTE — CHART NOTE - NSCHARTNOTEFT_GEN_A_CORE
Called by RN after pt's HR in 130-150s on monitor; /77 T 98.5 R 18.  Pt w/ hx of PAF admitted for CVA/ DNR/DNI/ on lopressor 25mg po BID.  Pt was hypokalemic in am, 3.4, not supplemented.  Pt w/ dementia, unable to give HPI, found to be in no distress.    RN to obtain rectal temp  Stat EKG  Lopressor 5mg IVPx1  Stat BMP, Mg, Phos  RN to repeat vitals and escalate prn.    22:45  Called by RN w/ repeat HR in 140-150s; /73; T(R) 99.8  EKG Atrial fibrillation w/ RVR; ; no ST/T wave changes appreciated  NS 500cc IVF bolus over an hour  Cardizem 10mg IVPx1  08-10    140  |  106  |  11.6  ----------------------------<  147<H>  3.6   |  23.0  |  0.43<L>    Ca    8.3<L>      10 Aug 2022 22:10  Phos  3.2     08-10  Mg     1.8     08-10    KCL powder 40mEq q4 x2 doses  MgSO4 2gm IVPB stat  RN to repeat vitals and escalate for change in status    8/11/22  00:30  /68 P 120-150s   Pt in no distress  Digoxin 0.25mg IVPx1 stat  Will f/u w/ Cardizem if no improvement in HR Called by RN after pt's HR in 130-150s on monitor; /77 T 98.5 R 18.  Pt w/ hx of PAF admitted for CVA/ DNR/DNI/ on lopressor 25mg po BID.  Pt was hypokalemic in am, 3.4, not supplemented.  Pt w/ dementia, unable to give HPI, found to be in no distress.    RN to obtain rectal temp  Stat EKG  Lopressor 5mg IVPx1  Stat BMP, Mg, Phos  RN to repeat vitals and escalate prn.    22:45  Called by RN w/ repeat HR in 140-150s; /73; T(R) 99.8  EKG Atrial fibrillation w/ RVR; ; no ST/T wave changes appreciated  NS 500cc IVF bolus over an hour  Cardizem 10mg IVPx1  08-10    140  |  106  |  11.6  ----------------------------<  147<H>  3.6   |  23.0  |  0.43<L>    Ca    8.3<L>      10 Aug 2022 22:10  Phos  3.2     08-10  Mg     1.8     08-10    KCL powder 40mEq q4 x2 doses  MgSO4 2gm IVPB stat  Repeat K, Mg ordered for am  RN to repeat vitals and escalate for change in status    8/11/22  00:30  /68 P 120-150s   Pt in no distress  Digoxin 0.25mg IVPx1 stat  Will f/u w/ Cardizem if no improvement in HR    01:10  /70 P 140s  Cardizem 10mg IVPx1 stat  Cardiology PA was called and made aware of above; will reconsult EP in am  RN to repeat vitals and call PA Called by RN after pt's HR in 130-150s on monitor; /77 T 98.5 R 18.  Pt w/ hx of PAF admitted for CVA/ DNR/DNI/ on lopressor 25mg po BID.  Pt was hypokalemic in am, 3.4, not supplemented.  Pt w/ dementia, unable to give HPI, found to be in no distress.    RN to obtain rectal temp  Stat EKG  Lopressor 5mg IVPx1  Stat BMP, Mg, Phos  RN to repeat vitals and escalate prn.    22:45  Called by RN w/ repeat HR in 140-150s; /73; T(R) 99.8  EKG Atrial fibrillation w/ RVR; ; no ST/T wave changes appreciated  NS 500cc IVF bolus over an hour  Cardizem 10mg IVPx1  08-10    140  |  106  |  11.6  ----------------------------<  147<H>  3.6   |  23.0  |  0.43<L>    Ca    8.3<L>      10 Aug 2022 22:10  Phos  3.2     08-10  Mg     1.8     08-10    KCL powder 40mEq q4 x2 doses  MgSO4 2gm IVPB stat  Repeat K, Mg ordered for am  RN to repeat vitals and escalate for change in status    8/11/22  00:30  /68 P 120-150s   Pt in no distress  Digoxin 0.25mg IVPx1 stat  Will f/u w/ Cardizem if no improvement in HR    01:10  /70 P 140s  Cardizem 10mg IVPx1 stat  Cardiology PA was called and made aware of above; will reconsult EP in am  RN to repeat vitals and call PA    02:15  /63 P 146  Pt continues to be in no distress  Lopressor 5mg IVP stat as per cardiology recommendation  Cardiology PA will come up to evaluate pt, might need to consult ICU due to persistent tachycardia w/ multiple rounds of meds and w/ soft BP  RN was made aware of plan, will escalate to cardiology PA for persistent tachycardia after lopressor Called by RN after pt's HR in 130-150s on monitor; /77 T 98.5 R 18.  Pt w/ previous episodes of PAF this admission admitted for CVA/ DNR/DNI/ on lopressor 25mg po BID.  Pt was hypokalemic in am, 3.4, not supplemented.  Pt w/ dementia, unable to give HPI, found to be in no distress.    RN to obtain rectal temp  Stat EKG  Lopressor 5mg IVPx1  Stat BMP, Mg, Phos  RN to repeat vitals and escalate prn.    22:45  Called by RN w/ repeat HR in 140-150s; /73; T(R) 99.8  EKG Atrial fibrillation w/ RVR; ; no ST/T wave changes appreciated  NS 500cc IVF bolus over an hour  Cardizem 10mg IVPx1  08-10    140  |  106  |  11.6  ----------------------------<  147<H>  3.6   |  23.0  |  0.43<L>    Ca    8.3<L>      10 Aug 2022 22:10  Phos  3.2     08-10  Mg     1.8     08-10    KCL powder 40mEq q4 x2 doses  MgSO4 2gm IVPB stat  Repeat K, Mg ordered for am  RN to repeat vitals and escalate for change in status    8/11/22  00:30  /68 P 120-150s   Pt in no distress  Digoxin 0.25mg IVPx1 stat  Will f/u w/ Cardizem if no improvement in HR    01:10  /70 P 140s  Cardizem 10mg IVPx1 stat  Cardiology PA was called and made aware of above; will reconsult EP in am  RN to repeat vitals and call PA    02:15  /63 P 146  Pt continues to be in no distress  Lopressor 5mg IVP stat as per cardiology recommendation  Cardiology PA will come up to evaluate pt, might need to consult ICU due to persistent tachycardia w/ multiple rounds of meds and w/ soft BP  RN was made aware of plan, will escalate to cardiology PA for persistent tachycardia after lopressor Called by RN after pt's HR in 130-150s on monitor; /77 T 98.5 R 18.  Pt w/ previous episodes of PAF this admission admitted for CVA/ DNR/DNI/ on lopressor 25mg po BID.  Pt was hypokalemic in am, 3.4, not supplemented.  Pt w/ dementia, unable to give HPI, found to be in no distress.    RN to obtain rectal temp  Stat EKG  Lopressor 5mg IVPx1  Stat BMP, Mg, Phos  RN to repeat vitals and escalate prn.    22:45  Called by RN w/ repeat HR in 140-150s; /73; T(R) 99.8  EKG Atrial fibrillation w/ RVR; ; no ST/T wave changes appreciated  NS 500cc IVF bolus over an hour  Cardizem 10mg IVPx1  08-10    140  |  106  |  11.6  ----------------------------<  147<H>  3.6   |  23.0  |  0.43<L>    Ca    8.3<L>      10 Aug 2022 22:10  Phos  3.2     08-10  Mg     1.8     08-10    KCL powder 40mEq q4 x2 doses  MgSO4 2gm IVPB stat  Repeat K, Mg ordered for am  RN to repeat vitals and escalate for change in status    8/11/22  00:30  /68 P 120-150s   Pt in no distress  Digoxin 0.25mg IVPx1 stat  Will f/u w/ Cardizem if no improvement in HR    01:10  /70 P 140s  Cardizem 10mg IVPx1 stat  Cardiology PA was called and made aware of above; will reconsult EP in am  RN to repeat vitals and call PA    02:15  /63 P 146  Pt continues to be in no distress  Lopressor 5mg IVP stat as per cardiology recommendation  Cardiology PA will come up to evaluate pt  RN was made aware of plan, will escalate to cardiology PA for persistent tachycardia after lopressor Called by RN after pt's HR in 130-150s on monitor; /77 T 98.5 R 18.  Pt w/ previous episodes of PAF this admission admitted for CVA/ DNR/DNI/ on lopressor 25mg po BID.  Pt was hypokalemic in am, 3.4, not supplemented.  Pt w/ dementia, unable to give HPI, found to be in no distress.    RN to obtain rectal temp  Stat EKG  Lopressor 5mg IVPx1  Stat BMP, Mg, Phos  RN to repeat vitals and escalate prn.    22:45  Called by RN w/ repeat HR in 140-150s; /73; T(R) 99.8  EKG Atrial fibrillation w/ RVR; ; no ST/T wave changes appreciated  NS 500cc IVF bolus over an hour  Cardizem 10mg IVPx1  08-10    140  |  106  |  11.6  ----------------------------<  147<H>  3.6   |  23.0  |  0.43<L>    Ca    8.3<L>      10 Aug 2022 22:10  Phos  3.2     08-10  Mg     1.8     08-10    KCL powder 40mEq q4 x2 doses  MgSO4 2gm IVPB stat  Repeat K, Mg ordered for am  RN to repeat vitals and escalate for change in status    8/11/22  00:30  /68 P 120-150s   Pt in no distress  Digoxin 0.25mg IVPx1 stat  Will f/u w/ Cardizem if no improvement in HR    01:10  /70 P 140s  Cardizem 10mg IVPx1 stat  Cardiology PA was called and made aware of above; will reconsult EP in am  RN to repeat vitals and call PA    02:15  /63 P 146  Pt continues to be in no distress  Lopressor 5mg IVP stat as per cardiology recommendation  Cardiology PA will come up to evaluate pt; will f/u w/ AC recommendation  RN was made aware of plan, will escalate to cardiology PA for persistent tachycardia after lopressor

## 2022-08-10 NOTE — PROGRESS NOTE ADULT - ASSESSMENT
87 yo F w presents from [place] as code stroke found to have L MCA occlusion.  TPA given and POD 6 from mechanical thrombectomy of

## 2022-08-10 NOTE — PROGRESS NOTE ADULT - PROBLEM SELECTOR PLAN 2
.  - NSR with PACs  - SBP Goal 100-150  - TTE performed: EF 70-75%, GII DD, mod AS, mod AR, mild MR, mod TR, mild pulm HTN  - continue BB, statin

## 2022-08-10 NOTE — PROGRESS NOTE ADULT - SUBJECTIVE AND OBJECTIVE BOX
OVERNIGHT EVENTS: remains with NG tube     Present Symptoms:     Dyspnea: none   Nausea/Vomiting: No  Anxiety:  No  Depression: unable   Fatigue:  unable   Loss of appetite: unable   Constipation: none     Pain: none             Character-            Duration-            Effect-            Factors-            Frequency-            Location-            Severity-    Pain AD Score:  [email or web address] (press ctrl + left click to view)    Review of Systems: Reviewed                  Unable to obtain due to poor mentation   All others negative    MEDICATIONS  (STANDING):  AQUAPHOR (petrolatum Ointment) 1 Application(s) Topical two times a day  aspirin  chewable 81 milliGRAM(s) Oral daily  atorvastatin 20 milliGRAM(s) Oral at bedtime  chlorhexidine 2% Cloths 1 Application(s) Topical daily  doxazosin 2 milliGRAM(s) Oral at bedtime  enoxaparin Injectable 40 milliGRAM(s) SubCutaneous every 24 hours  melatonin 3 milliGRAM(s) Oral at bedtime  metoprolol tartrate 25 milliGRAM(s) Oral two times a day  multivitamin 1 Tablet(s) Oral daily  piperacillin/tazobactam IVPB.. 3.375 Gram(s) IV Intermittent every 8 hours  polyethylene glycol 3350 17 Gram(s) Oral daily  senna 2 Tablet(s) Oral at bedtime  sodium chloride 2 Gram(s) Oral three times a day    MEDICATIONS  (PRN):  acetaminophen    Suspension .. 650 milliGRAM(s) Oral every 6 hours PRN Temp greater or equal to 38C (100.4F), Mild Pain (1 - 3)  ALBUTerol    90 MICROgram(s) HFA Inhaler 2 Puff(s) Inhalation every 6 hours PRN Bronchospasm  albuterol/ipratropium for Nebulization 3 milliLiter(s) Nebulizer every 6 hours PRN Shortness of Breath and/or Wheezing    PHYSICAL EXAM:    Vital Signs Last 24 Hrs  T(C): 36.3 (10 Aug 2022 05:00), Max: 37.4 (09 Aug 2022 12:13)  T(F): 97.3 (10 Aug 2022 05:00), Max: 99.3 (09 Aug 2022 12:13)  HR: 100 (10 Aug 2022 05:00) (77 - 106)  BP: 145/74 (10 Aug 2022 05:00) (108/64 - 145/74)  BP(mean): --  RR: 22 (10 Aug 2022 05:00) (18 - 23)  SpO2: 94% (10 Aug 2022 05:00) (92% - 96%)    Parameters below as of 10 Aug 2022 05:00  Patient On (Oxygen Delivery Method): room air    General: alert, aphasia     Karnofsky:  30 %    HEENT: normal      Lungs: comfortable    CV: normal      GI: NG tube     : ashtyn     MSK: weakness      Skin: no rash    LABS:                     9.2    6.35  )-----------( 166      ( 10 Aug 2022 06:12 )             27.1     08-09    142  |  107  |  13.5  ----------------------------<  138<H>  3.7   |  26.0  |  0.46<L>    Ca    8.1<L>      09 Aug 2022 05:32  Phos  2.0     08-09  Mg     1.9     08-09    I&O's Summary    10 Aug 2022 07:01  -  10 Aug 2022 07:56  --------------------------------------------------------  IN: 0 mL / OUT: 550 mL / NET: -550 mL    RADIOLOGY & ADDITIONAL STUDIES:    ADVANCE DIRECTIVES/TREATMENT PREFERENCES:  DNR/DNI

## 2022-08-11 ENCOUNTER — TRANSCRIPTION ENCOUNTER (OUTPATIENT)
Age: 87
End: 2022-08-11

## 2022-08-11 VITALS
TEMPERATURE: 99 F | OXYGEN SATURATION: 93 % | SYSTOLIC BLOOD PRESSURE: 96 MMHG | DIASTOLIC BLOOD PRESSURE: 64 MMHG | RESPIRATION RATE: 20 BRPM | HEART RATE: 132 BPM

## 2022-08-11 LAB
APTT BLD: 27.2 SEC — LOW (ref 27.5–35.5)
INR BLD: 1.19 RATIO — HIGH (ref 0.88–1.16)
PROTHROM AB SERPL-ACNC: 13.8 SEC — HIGH (ref 10.5–13.4)
SARS-COV-2 RNA SPEC QL NAA+PROBE: SIGNIFICANT CHANGE UP
TROPONIN T SERPL-MCNC: 0.01 NG/ML — SIGNIFICANT CHANGE UP (ref 0–0.06)

## 2022-08-11 PROCEDURE — U0005: CPT

## 2022-08-11 PROCEDURE — 85730 THROMBOPLASTIN TIME PARTIAL: CPT

## 2022-08-11 PROCEDURE — 81001 URINALYSIS AUTO W/SCOPE: CPT

## 2022-08-11 PROCEDURE — 83036 HEMOGLOBIN GLYCOSYLATED A1C: CPT

## 2022-08-11 PROCEDURE — 97530 THERAPEUTIC ACTIVITIES: CPT

## 2022-08-11 PROCEDURE — 86901 BLOOD TYPING SEROLOGIC RH(D): CPT

## 2022-08-11 PROCEDURE — 99233 SBSQ HOSP IP/OBS HIGH 50: CPT

## 2022-08-11 PROCEDURE — 87641 MR-STAPH DNA AMP PROBE: CPT

## 2022-08-11 PROCEDURE — C1894: CPT

## 2022-08-11 PROCEDURE — 87070 CULTURE OTHR SPECIMN AEROBIC: CPT

## 2022-08-11 PROCEDURE — 93005 ELECTROCARDIOGRAM TRACING: CPT

## 2022-08-11 PROCEDURE — C1760: CPT

## 2022-08-11 PROCEDURE — 86900 BLOOD TYPING SEROLOGIC ABO: CPT

## 2022-08-11 PROCEDURE — 61645 PERQ ART M-THROMBECT &/NFS: CPT

## 2022-08-11 PROCEDURE — 92526 ORAL FUNCTION THERAPY: CPT

## 2022-08-11 PROCEDURE — 97116 GAIT TRAINING THERAPY: CPT

## 2022-08-11 PROCEDURE — 82803 BLOOD GASES ANY COMBINATION: CPT

## 2022-08-11 PROCEDURE — 85379 FIBRIN DEGRADATION QUANT: CPT

## 2022-08-11 PROCEDURE — 84443 ASSAY THYROID STIM HORMONE: CPT

## 2022-08-11 PROCEDURE — 80076 HEPATIC FUNCTION PANEL: CPT

## 2022-08-11 PROCEDURE — 94640 AIRWAY INHALATION TREATMENT: CPT

## 2022-08-11 PROCEDURE — 94760 N-INVAS EAR/PLS OXIMETRY 1: CPT

## 2022-08-11 PROCEDURE — 94002 VENT MGMT INPAT INIT DAY: CPT

## 2022-08-11 PROCEDURE — 86850 RBC ANTIBODY SCREEN: CPT

## 2022-08-11 PROCEDURE — 76380 CAT SCAN FOLLOW-UP STUDY: CPT

## 2022-08-11 PROCEDURE — 85027 COMPLETE CBC AUTOMATED: CPT

## 2022-08-11 PROCEDURE — 84145 PROCALCITONIN (PCT): CPT

## 2022-08-11 PROCEDURE — 80061 LIPID PANEL: CPT

## 2022-08-11 PROCEDURE — 85610 PROTHROMBIN TIME: CPT

## 2022-08-11 PROCEDURE — U0003: CPT

## 2022-08-11 PROCEDURE — C1757: CPT

## 2022-08-11 PROCEDURE — 84100 ASSAY OF PHOSPHORUS: CPT

## 2022-08-11 PROCEDURE — 83735 ASSAY OF MAGNESIUM: CPT

## 2022-08-11 PROCEDURE — 92610 EVALUATE SWALLOWING FUNCTION: CPT

## 2022-08-11 PROCEDURE — 36415 COLL VENOUS BLD VENIPUNCTURE: CPT

## 2022-08-11 PROCEDURE — 70450 CT HEAD/BRAIN W/O DYE: CPT

## 2022-08-11 PROCEDURE — C8929: CPT

## 2022-08-11 PROCEDURE — 71045 X-RAY EXAM CHEST 1 VIEW: CPT

## 2022-08-11 PROCEDURE — 97167 OT EVAL HIGH COMPLEX 60 MIN: CPT

## 2022-08-11 PROCEDURE — 80048 BASIC METABOLIC PNL TOTAL CA: CPT

## 2022-08-11 PROCEDURE — C1769: CPT

## 2022-08-11 PROCEDURE — 87640 STAPH A DNA AMP PROBE: CPT

## 2022-08-11 PROCEDURE — 84484 ASSAY OF TROPONIN QUANT: CPT

## 2022-08-11 PROCEDURE — C1887: CPT

## 2022-08-11 PROCEDURE — 99497 ADVNCD CARE PLAN 30 MIN: CPT | Mod: 25

## 2022-08-11 PROCEDURE — 97163 PT EVAL HIGH COMPLEX 45 MIN: CPT

## 2022-08-11 PROCEDURE — 93970 EXTREMITY STUDY: CPT

## 2022-08-11 PROCEDURE — 99239 HOSP IP/OBS DSCHRG MGMT >30: CPT

## 2022-08-11 RX ORDER — IPRATROPIUM/ALBUTEROL SULFATE 18-103MCG
3 AEROSOL WITH ADAPTER (GRAM) INHALATION
Qty: 0 | Refills: 0 | DISCHARGE
Start: 2022-08-11

## 2022-08-11 RX ORDER — DIAZEPAM 5 MG
10 TABLET ORAL
Qty: 0 | Refills: 0 | DISCHARGE
Start: 2022-08-11

## 2022-08-11 RX ORDER — AMLODIPINE BESYLATE 2.5 MG/1
1 TABLET ORAL
Qty: 0 | Refills: 0 | DISCHARGE

## 2022-08-11 RX ORDER — HEPARIN SODIUM 5000 [USP'U]/ML
4000 INJECTION INTRAVENOUS; SUBCUTANEOUS EVERY 6 HOURS
Refills: 0 | Status: DISCONTINUED | OUTPATIENT
Start: 2022-08-11 | End: 2022-08-11

## 2022-08-11 RX ORDER — MORPHINE SULFATE 50 MG/1
2 CAPSULE, EXTENDED RELEASE ORAL
Qty: 0 | Refills: 0 | DISCHARGE
Start: 2022-08-11

## 2022-08-11 RX ORDER — HEPARIN SODIUM 5000 [USP'U]/ML
2000 INJECTION INTRAVENOUS; SUBCUTANEOUS EVERY 6 HOURS
Refills: 0 | Status: DISCONTINUED | OUTPATIENT
Start: 2022-08-11 | End: 2022-08-11

## 2022-08-11 RX ORDER — DIAZEPAM 5 MG
10 TABLET ORAL EVERY 8 HOURS
Refills: 0 | Status: DISCONTINUED | OUTPATIENT
Start: 2022-08-11 | End: 2022-08-11

## 2022-08-11 RX ORDER — MORPHINE SULFATE 50 MG/1
2 CAPSULE, EXTENDED RELEASE ORAL EVERY 4 HOURS
Refills: 0 | Status: DISCONTINUED | OUTPATIENT
Start: 2022-08-11 | End: 2022-08-11

## 2022-08-11 RX ORDER — METOPROLOL TARTRATE 50 MG
25 TABLET ORAL THREE TIMES A DAY
Refills: 0 | Status: DISCONTINUED | OUTPATIENT
Start: 2022-08-11 | End: 2022-08-11

## 2022-08-11 RX ORDER — HEPARIN SODIUM 5000 [USP'U]/ML
INJECTION INTRAVENOUS; SUBCUTANEOUS
Qty: 25000 | Refills: 0 | Status: DISCONTINUED | OUTPATIENT
Start: 2022-08-11 | End: 2022-08-11

## 2022-08-11 RX ORDER — METOPROLOL TARTRATE 50 MG
5 TABLET ORAL ONCE
Refills: 0 | Status: COMPLETED | OUTPATIENT
Start: 2022-08-11 | End: 2022-08-11

## 2022-08-11 RX ORDER — MORPHINE SULFATE 50 MG/1
2 CAPSULE, EXTENDED RELEASE ORAL EVERY 6 HOURS
Refills: 0 | Status: DISCONTINUED | OUTPATIENT
Start: 2022-08-11 | End: 2022-08-11

## 2022-08-11 RX ORDER — DIGOXIN 250 MCG
250 TABLET ORAL ONCE
Refills: 0 | Status: COMPLETED | OUTPATIENT
Start: 2022-08-11 | End: 2022-08-11

## 2022-08-11 RX ORDER — METOPROLOL TARTRATE 50 MG
25 TABLET ORAL
Refills: 0 | Status: DISCONTINUED | OUTPATIENT
Start: 2022-08-11 | End: 2022-08-11

## 2022-08-11 RX ORDER — PETROLATUM,WHITE
1 JELLY (GRAM) TOPICAL
Qty: 0 | Refills: 0 | DISCHARGE
Start: 2022-08-11

## 2022-08-11 RX ORDER — HEPARIN SODIUM 5000 [USP'U]/ML
4000 INJECTION INTRAVENOUS; SUBCUTANEOUS ONCE
Refills: 0 | Status: COMPLETED | OUTPATIENT
Start: 2022-08-11 | End: 2022-08-11

## 2022-08-11 RX ORDER — METOPROLOL TARTRATE 50 MG
1 TABLET ORAL
Qty: 0 | Refills: 0 | DISCHARGE

## 2022-08-11 RX ORDER — DIAZEPAM 5 MG
10 TABLET ORAL EVERY 4 HOURS
Refills: 0 | Status: DISCONTINUED | OUTPATIENT
Start: 2022-08-11 | End: 2022-08-11

## 2022-08-11 RX ORDER — DILTIAZEM HCL 120 MG
10 CAPSULE, EXT RELEASE 24 HR ORAL ONCE
Refills: 0 | Status: COMPLETED | OUTPATIENT
Start: 2022-08-11 | End: 2022-08-11

## 2022-08-11 RX ORDER — MORPHINE SULFATE 50 MG/1
2 CAPSULE, EXTENDED RELEASE ORAL ONCE
Refills: 0 | Status: DISCONTINUED | OUTPATIENT
Start: 2022-08-11 | End: 2022-08-11

## 2022-08-11 RX ADMIN — Medication 1 APPLICATION(S): at 18:09

## 2022-08-11 RX ADMIN — Medication 1 APPLICATION(S): at 05:30

## 2022-08-11 RX ADMIN — MORPHINE SULFATE 2 MILLIGRAM(S): 50 CAPSULE, EXTENDED RELEASE ORAL at 09:45

## 2022-08-11 RX ADMIN — HEPARIN SODIUM 1000 UNIT(S)/HR: 5000 INJECTION INTRAVENOUS; SUBCUTANEOUS at 07:42

## 2022-08-11 RX ADMIN — Medication 25 MILLIGRAM(S): at 05:31

## 2022-08-11 RX ADMIN — HEPARIN SODIUM 4000 UNIT(S): 5000 INJECTION INTRAVENOUS; SUBCUTANEOUS at 05:52

## 2022-08-11 RX ADMIN — Medication 5 MILLIGRAM(S): at 06:38

## 2022-08-11 RX ADMIN — MORPHINE SULFATE 2 MILLIGRAM(S): 50 CAPSULE, EXTENDED RELEASE ORAL at 18:08

## 2022-08-11 RX ADMIN — HEPARIN SODIUM 1000 UNIT(S)/HR: 5000 INJECTION INTRAVENOUS; SUBCUTANEOUS at 05:53

## 2022-08-11 RX ADMIN — SODIUM CHLORIDE 2 GRAM(S): 9 INJECTION INTRAMUSCULAR; INTRAVENOUS; SUBCUTANEOUS at 05:30

## 2022-08-11 RX ADMIN — MORPHINE SULFATE 2 MILLIGRAM(S): 50 CAPSULE, EXTENDED RELEASE ORAL at 12:50

## 2022-08-11 RX ADMIN — Medication 40 MILLIEQUIVALENT(S): at 02:40

## 2022-08-11 RX ADMIN — Medication 5 MILLIGRAM(S): at 02:38

## 2022-08-11 RX ADMIN — MORPHINE SULFATE 2 MILLIGRAM(S): 50 CAPSULE, EXTENDED RELEASE ORAL at 08:37

## 2022-08-11 RX ADMIN — MORPHINE SULFATE 2 MILLIGRAM(S): 50 CAPSULE, EXTENDED RELEASE ORAL at 12:30

## 2022-08-11 RX ADMIN — Medication 10 MILLIGRAM(S): at 14:36

## 2022-08-11 RX ADMIN — Medication 10 MILLIGRAM(S): at 01:28

## 2022-08-11 RX ADMIN — PIPERACILLIN AND TAZOBACTAM 25 GRAM(S): 4; .5 INJECTION, POWDER, LYOPHILIZED, FOR SOLUTION INTRAVENOUS at 05:29

## 2022-08-11 RX ADMIN — Medication 250 MICROGRAM(S): at 00:35

## 2022-08-11 RX ADMIN — CHLORHEXIDINE GLUCONATE 1 APPLICATION(S): 213 SOLUTION TOPICAL at 13:45

## 2022-08-11 NOTE — CHART NOTE - NSCHARTNOTESELECT_GEN_ALL_CORE
Event Note
Event Note
Neurointerventional Surgery/Event Note
Transfer Note
Cardiology/Off Service Note
Pre procedure/Event Note

## 2022-08-11 NOTE — DISCHARGE NOTE PROVIDER - NSDCCPCAREPLAN_GEN_ALL_CORE_FT
PRINCIPAL DISCHARGE DIAGNOSIS  Diagnosis: Stroke  Assessment and Plan of Treatment: - ca re per hospice

## 2022-08-11 NOTE — PROGRESS NOTE ADULT - SUBJECTIVE AND OBJECTIVE BOX
Patient is a 88y old  Female who presents with a chief complaint of Stroke (11 Aug 2022 08:47)    Patient seen and examined at bedside.     ALLERGIES:  No Known Allergies    MEDICATIONS  (STANDING):  AQUAPHOR (petrolatum Ointment) 1 Application(s) Topical two times a day  chlorhexidine 2% Cloths 1 Application(s) Topical daily  diazepam  Injectable 10 milliGRAM(s) IV Push every 8 hours  melatonin 3 milliGRAM(s) Oral at bedtime  morphine  - Injectable 2 milliGRAM(s) IV Push every 6 hours  sodium chloride 2 Gram(s) Oral three times a day    MEDICATIONS  (PRN):  ALBUTerol    90 MICROgram(s) HFA Inhaler 2 Puff(s) Inhalation every 6 hours PRN Bronchospasm  albuterol/ipratropium for Nebulization 3 milliLiter(s) Nebulizer every 6 hours PRN Shortness of Breath and/or Wheezing  diazepam  Injectable 10 milliGRAM(s) IV Push every 4 hours PRN agtiation or restlessness  morphine  - Injectable 2 milliGRAM(s) IV Push every 4 hours PRN dyspnea or pain    Vital Signs Last 24 Hrs  T(F): 98 (11 Aug 2022 12:39), Max: 99.8 (10 Aug 2022 22:46)  HR: 155 (11 Aug 2022 12:39) (91 - 156)  BP: 134/83 (11 Aug 2022 12:39) (101/63 - 163/72)  RR: 18 (11 Aug 2022 12:39) (18 - 19)  SpO2: 95% (11 Aug 2022 12:39) (92% - 98%)  I&O's Summary    10 Aug 2022 07:01  -  11 Aug 2022 07:00  --------------------------------------------------------  IN: 1610 mL / OUT: 1800 mL / NET: -190 mL      PHYSICAL EXAM:  General: NAD, laying in bed +garbled speech   ENT: MMM, no thrush  Neck: Supple, No JVD  Lungs: Clear to auscultation bilaterally, good air entry, non-labored breathing  Cardio: +s1/s2  Abdomen: Soft, Nontender, Nondistended; Bowel sounds present  Extremities: No calf tenderness, No pitting edema    LABS:                        9.2    6.35  )-----------( 166      ( 10 Aug 2022 06:12 )             27.1     08-10    140  |  106  |  11.6  ----------------------------<  147  3.6   |  23.0  |  0.43    Ca    8.3      10 Aug 2022 22:10  Phos  3.2     08-10  Mg     1.8     08-10            PT/INR - ( 11 Aug 2022 04:20 )   PT: 13.8 sec;   INR: 1.19 ratio    PTT - ( 11 Aug 2022 04:20 )  PTT:27.2 sec    08-05 Chol 154 mg/dL LDL -- HDL 65 mg/dL Trig 86 mg/dL    Cultures  Culture - Sputum (collected 07 Aug 2022 11:58)  Source: .Sputum Sputum  Gram Stain (07 Aug 2022 23:18):    Numerous polymorphonuclear leukocytes per low power field    Few Squamous epithelial cells per low power field    Few Gram positive cocci in pairs per oil power field    Rare Gram Variable Rods per oil power field  Final Report (09 Aug 2022 15:53):    Normal Respiratory Tiera present    RADIOLOGY & ADDITIONAL TESTS:  - no new tests    Care Discussed with Consultants/Other Providers:   Palliative Care

## 2022-08-11 NOTE — PROGRESS NOTE ADULT - ASSESSMENT
88F with remote breast cancer history, HTN, HLD, transferred from [place] with acute stroke s/p TPA, mechanical thrombectomy, now extubated, with dysphagia, transferred to medical service, overnight developed afib with RVR.     #1 Acute stroke, s/p TPA, mechanical thrombectomy  - CT as above - acute left ADRYAN, left MCA, and right PCA with suspicions for cardioembolic sources  - workup in progress per medical team  - ASA/statin  - MRI pending  - poor prognosis for meaningful recovery     #2 acute respiratory failure  - extubated 8/5   - on nasal cannula  - being treated for possible superimposed aspiration related pneumonia    #3 Dysphagia  - failed speech and swallow 8/8 and 8/9 and 8/10 - no PEG per my discussion with daughter Franny   - NG tube trial feeds - now with larger stool volume - hold tube feeds and further discussion today with daughter, she had already said no PEG     #4 Encounter for palliative care  - will call daughter today to discuss comfort care

## 2022-08-11 NOTE — DISCHARGE NOTE PROVIDER - ATTENDING DISCHARGE PHYSICAL EXAMINATION:
PHYSICAL EXAM:  General: NAD, laying in bed +garbled speech   ENT: MMM, no thrush  Neck: Supple, No JVD  Lungs: Clear to auscultation bilaterally, good air entry, non-labored breathing  Cardio: +s1/s2  Abdomen: Soft, Nontender, Nondistended; Bowel sounds present  Extremities: No calf tenderness, No pitting edema

## 2022-08-11 NOTE — PROGRESS NOTE ADULT - REASON FOR ADMISSION
Stroke

## 2022-08-11 NOTE — DISCHARGE NOTE PROVIDER - HOSPITAL COURSE
88F remote hx breast CA, HTN, HLD transferred from [facility] for Acute Stroke (L MCA M1 occlusion s/p TPA and M.T. TICI 3 recanalization) s/p extubation on 8/5 ccb Acute Hypoxic Resp failure sec to aspiration pna started on IV zosyn on 8/7 and dysphagia/poor mental status requiring NGT placement. after cva patient unable to improve on swallow status and seen multiple times via speech and swallow. as continued to fail family opted to pursue comfort care and remove ng tube.

## 2022-08-11 NOTE — PROGRESS NOTE ADULT - SUBJECTIVE AND OBJECTIVE BOX
OVERNIGHT EVENTS: doing poorly, afib with RVR, difficult to control, started on continuous heparin drip     Present Symptoms:     Dyspnea: none   Nausea/Vomiting: No  Anxiety:  No  Depression: unable   Fatigue: unable   Loss of appetite: unable   Constipation: none     Pain: none             Character-            Duration-            Effect-            Factors-            Frequency-            Location-            Severity-    Pain AD Score:  [email or web address] (press ctrl + left click to view)    Review of Systems: Reviewed                Unable to obtain due to poor mentation   All others negative    MEDICATIONS  (STANDING):  AQUAPHOR (petrolatum Ointment) 1 Application(s) Topical two times a day  aspirin  chewable 81 milliGRAM(s) Oral daily  atorvastatin 20 milliGRAM(s) Oral at bedtime  chlorhexidine 2% Cloths 1 Application(s) Topical daily  doxazosin 2 milliGRAM(s) Oral at bedtime  heparin  Infusion.  Unit(s)/Hr (10 mL/Hr) IV Continuous <Continuous>  melatonin 3 milliGRAM(s) Oral at bedtime  metoprolol tartrate 25 milliGRAM(s) Oral three times a day  morphine  - Injectable 2 milliGRAM(s) IV Push once  multivitamin 1 Tablet(s) Oral daily  piperacillin/tazobactam IVPB.. 3.375 Gram(s) IV Intermittent every 8 hours  polyethylene glycol 3350 17 Gram(s) Oral daily  senna 2 Tablet(s) Oral at bedtime  sodium chloride 2 Gram(s) Oral three times a day    MEDICATIONS  (PRN):  acetaminophen    Suspension .. 650 milliGRAM(s) Oral every 6 hours PRN Temp greater or equal to 38C (100.4F), Mild Pain (1 - 3)  ALBUTerol    90 MICROgram(s) HFA Inhaler 2 Puff(s) Inhalation every 6 hours PRN Bronchospasm  albuterol/ipratropium for Nebulization 3 milliLiter(s) Nebulizer every 6 hours PRN Shortness of Breath and/or Wheezing  heparin   Injectable 4000 Unit(s) IV Push every 6 hours PRN For aPTT less than 40  heparin   Injectable 2000 Unit(s) IV Push every 6 hours PRN For aPTT between 40 - 57  morphine  - Injectable 2 milliGRAM(s) IV Push every 4 hours PRN dyspnea or pain    PHYSICAL EXAM:    Vital Signs Last 24 Hrs  T(C): 37 (11 Aug 2022 05:38), Max: 37.7 (10 Aug 2022 22:46)  T(F): 98.6 (11 Aug 2022 05:38), Max: 99.8 (10 Aug 2022 22:46)  HR: 156 (11 Aug 2022 06:30) (91 - 156)  BP: 110/62 (11 Aug 2022 06:30) (101/63 - 163/72)  BP(mean): --  RR: 18 (11 Aug 2022 05:38) (18 - 20)  SpO2: 93% (11 Aug 2022 05:38) (92% - 98%)    Parameters below as of 11 Aug 2022 05:38  Patient On (Oxygen Delivery Method): room air    General: lethargic     Karnofsky:  20 %    HEENT: normal      Lungs: comfortable     CV: normal      GI: NG tube     : chamorro    MSK: weakness     Skin: no rash    LABS:                      9.2    6.35  )-----------( 166      ( 10 Aug 2022 06:12 )             27.1     08-10    140  |  106  |  11.6  ----------------------------<  147<H>  3.6   |  23.0  |  0.43<L>    Ca    8.3<L>      10 Aug 2022 22:10  Phos  3.2     08-10  Mg     1.8     08-10    PT/INR - ( 11 Aug 2022 04:20 )   PT: 13.8 sec;   INR: 1.19 ratio      PTT - ( 11 Aug 2022 04:20 )  PTT:27.2 sec    I&O's Summary    10 Aug 2022 07:01  -  11 Aug 2022 07:00  --------------------------------------------------------  IN: 1610 mL / OUT: 1800 mL / NET: -190 mL        RADIOLOGY & ADDITIONAL STUDIES:    ADVANCE DIRECTIVES/TREATMENT PREFERENCES:  DNR YES NO  Completed on:                     MOLST  YES NO   Completed on:  Living Will  YES NO   Completed on: OVERNIGHT EVENTS: doing poorly, afib with RVR, difficult to control, started on continuous heparin drip     Present Symptoms:     Dyspnea: none   Nausea/Vomiting: No  Anxiety:  No  Depression: unable   Fatigue: unable   Loss of appetite: unable   Constipation: none     Pain: none             Character-            Duration-            Effect-            Factors-            Frequency-            Location-            Severity-    Pain AD Score:  [email or web address] (press ctrl + left click to view)    Review of Systems: Reviewed                Unable to obtain due to poor mentation   All others negative    MEDICATIONS  (STANDING):  AQUAPHOR (petrolatum Ointment) 1 Application(s) Topical two times a day  aspirin  chewable 81 milliGRAM(s) Oral daily  atorvastatin 20 milliGRAM(s) Oral at bedtime  chlorhexidine 2% Cloths 1 Application(s) Topical daily  doxazosin 2 milliGRAM(s) Oral at bedtime  heparin  Infusion.  Unit(s)/Hr (10 mL/Hr) IV Continuous <Continuous>  melatonin 3 milliGRAM(s) Oral at bedtime  metoprolol tartrate 25 milliGRAM(s) Oral three times a day  morphine  - Injectable 2 milliGRAM(s) IV Push once  multivitamin 1 Tablet(s) Oral daily  piperacillin/tazobactam IVPB.. 3.375 Gram(s) IV Intermittent every 8 hours  polyethylene glycol 3350 17 Gram(s) Oral daily  senna 2 Tablet(s) Oral at bedtime  sodium chloride 2 Gram(s) Oral three times a day    MEDICATIONS  (PRN):  acetaminophen    Suspension .. 650 milliGRAM(s) Oral every 6 hours PRN Temp greater or equal to 38C (100.4F), Mild Pain (1 - 3)  ALBUTerol    90 MICROgram(s) HFA Inhaler 2 Puff(s) Inhalation every 6 hours PRN Bronchospasm  albuterol/ipratropium for Nebulization 3 milliLiter(s) Nebulizer every 6 hours PRN Shortness of Breath and/or Wheezing  heparin   Injectable 4000 Unit(s) IV Push every 6 hours PRN For aPTT less than 40  heparin   Injectable 2000 Unit(s) IV Push every 6 hours PRN For aPTT between 40 - 57  morphine  - Injectable 2 milliGRAM(s) IV Push every 4 hours PRN dyspnea or pain    PHYSICAL EXAM:    Vital Signs Last 24 Hrs  T(C): 37 (11 Aug 2022 05:38), Max: 37.7 (10 Aug 2022 22:46)  T(F): 98.6 (11 Aug 2022 05:38), Max: 99.8 (10 Aug 2022 22:46)  HR: 156 (11 Aug 2022 06:30) (91 - 156)  BP: 110/62 (11 Aug 2022 06:30) (101/63 - 163/72)  BP(mean): --  RR: 18 (11 Aug 2022 05:38) (18 - 20)  SpO2: 93% (11 Aug 2022 05:38) (92% - 98%)    Parameters below as of 11 Aug 2022 05:38  Patient On (Oxygen Delivery Method): room air    General: lethargic     Karnofsky:  20 %    HEENT: normal      Lungs: comfortable     CV: normal      GI: NG tube     : chamorro    MSK: weakness     Skin: no rash    LABS:                      9.2    6.35  )-----------( 166      ( 10 Aug 2022 06:12 )             27.1     08-10    140  |  106  |  11.6  ----------------------------<  147<H>  3.6   |  23.0  |  0.43<L>    Ca    8.3<L>      10 Aug 2022 22:10  Phos  3.2     08-10  Mg     1.8     08-10    PT/INR - ( 11 Aug 2022 04:20 )   PT: 13.8 sec;   INR: 1.19 ratio      PTT - ( 11 Aug 2022 04:20 )  PTT:27.2 sec    I&O's Summary    10 Aug 2022 07:01  -  11 Aug 2022 07:00  --------------------------------------------------------  IN: 1610 mL / OUT: 1800 mL / NET: -190 mL    RADIOLOGY & ADDITIONAL STUDIES:    ADVANCE DIRECTIVES/TREATMENT PREFERENCES:  DNR/DNI

## 2022-08-11 NOTE — CHART NOTE - NSCHARTNOTEFT_GEN_A_CORE
Called earlier by Medicine CHARLIE Morton, pt with uncontrolled sinus tachycardia. Patient received several rounds of Metoprolol IVP and Cardizem IVP however pt still with uncontrolled HR  At my arrival EKG shows A-fib with RVR. A-fib is now clarified. Pt had some runs of PAF and was seen by EP team however now pt is on AF continuously on monitor.      REVIEW OF SYMPTOMS: Unable to perform due to AMS    VITAL SIGNS:   T(C): 37.7 (08-10-22 @ 22:46), Max: 37.7 (08-10-22 @ 22:46)  T(F): 99.8 (08-10-22 @ 22:46), Max: 99.8 (08-10-22 @ 22:46)  HR: 129 (08-11-22 @ 03:06) (91 - 154)  BP: 116/66 (08-11-22 @ 03:06) (101/63 - 163/72)  RR: 19 (08-10-22 @ 21:00) (18 - 22)  SpO2: 92% (08-10-22 @ 21:00) (92% - 98%)    PHYSICAL EXAM:   Constitutional: Comfortable . No acute distress.   HEENT: Atraumatic and normocephalic , neck is supple . no JVD.   CNS: A&Ox1. Falling asleep between sentences   Respiratory: CTAB, unlabored. No wheezing, No crackles or rhonchi   Cardiovascular:  + Tachycardic, irregular, irregular. normal s1 s2. + murmur 2 ics R to sternum. No rubs or gallop.  Gastrointestinal: Soft, non-tender. +Bowel sounds.   Extremities: 2+ Peripheral Pulses, No edema  Psychiatric: Calm . no agitation.   Skin: Warm and dry    LABS:   ( 08 Aug 2022 00:06 )  Troponin T  0.02 ,  CPK  X    , CKMB  X    , BNP X                           9.2    6.35  )-----------( 166      ( 10 Aug 2022 06:12 )             27.1     08-10    140  |  106  |  11.6  ----------------------------<  147<H>  3.6   |  23.0  |  0.43<L>    Ca    8.3<L>      10 Aug 2022 22:10  Phos  3.2     08-10  Mg     1.8     08-10      A&P                                    88 F last known well at 2:15pm, found around 330pm to have aphasia and right sided weakness. Presents to [facility] as code stroke found to have L MCA occlusion. Given TPA at 4:05pm and intubated for airway protection. Transferred to [facility] for mechanical thrombectomy, NIH 23, MRS 0 (04 Aug 2022 18:45)    Pt is S/P IV tPA and Thrombectomy for L Proximal MCA occlusion with TICI 3 reperfusion on 8-04-22. Now has L MCA, L ADRYAN and R PCA infarcts.    Telemonitor  HR Uncontrolled. Increase Metoprolol 25 mg TID with strict parameters. Use NG tube   May use Metoprolol 5 mg IVP for sustained HR >125   [id number] VASc:  6 points. Stroke risk was 9.7% and 13.6% risk of stroke/TIA/systemic embolism.  Start full AC with Heparin. Monitor coag panel and daily CBC  Trend CE. Trend trops x 3 q6. Serial EKGs  Replace all electrolytes. Maintain K+~4 and mag~2    Will update Dr Moscoso in the AM. Further recommendations to follow Called earlier by Medicine CHARLIE Camacho, pt with uncontrolled tachycardia. Patient received several rounds of Metoprolol IVP, Cardizem IVP and digoxin 0.25 x 1 however pt still with uncontrolled HR  At my arrival EKG shows A-fib with RVR. A-fib which is now clarified. Pt had some runs of PAF and was seen by EP team however now pt is on AF continuously on monitor.      REVIEW OF SYMPTOMS: Unable to perform due to AMS    VITAL SIGNS:   T(C): 37.7 (08-10-22 @ 22:46), Max: 37.7 (08-10-22 @ 22:46)  T(F): 99.8 (08-10-22 @ 22:46), Max: 99.8 (08-10-22 @ 22:46)  HR: 129 (08-11-22 @ 03:06) (91 - 154)  BP: 116/66 (08-11-22 @ 03:06) (101/63 - 163/72)  RR: 19 (08-10-22 @ 21:00) (18 - 22)  SpO2: 92% (08-10-22 @ 21:00) (92% - 98%)    PHYSICAL EXAM:   Constitutional: Comfortable . No acute distress.   HEENT: Atraumatic and normocephalic , neck is supple . no JVD.   CNS: A&Ox1. Falling asleep between sentences   Respiratory: CTAB, unlabored. No wheezing, No crackles or rhonchi   Cardiovascular:  + Tachycardic, irregular, irregular. normal s1 s2. + murmur 2 ics R to sternum. No rubs or gallop.  Gastrointestinal: Soft, non-tender. +Bowel sounds.   Extremities: 2+ Peripheral Pulses, No edema  Psychiatric: Calm . no agitation.   Skin: Warm and dry    LABS:   ( 08 Aug 2022 00:06 )  Troponin T  0.02 ,  CPK  X    , CKMB  X    , BNP X                           9.2    6.35  )-----------( 166      ( 10 Aug 2022 06:12 )             27.1     08-10    140  |  106  |  11.6  ----------------------------<  147<H>  3.6   |  23.0  |  0.43<L>    Ca    8.3<L>      10 Aug 2022 22:10  Phos  3.2     08-10  Mg     1.8     08-10      A&P                                    88 F last known well at 2:15pm, found around 330pm to have aphasia and right sided weakness. Presents to Gely as code stroke found to have L MCA occlusion. Given TPA at 4:05pm and intubated for airway protection. Transferred to [facility] for mechanical thrombectomy, NIH 23, MRS 0 (04 Aug 2022 18:45)    Pt is S/P IV tPA and Thrombectomy for L Proximal MCA occlusion with TICI 3 reperfusion on 8-04-22. Now has L MCA, L ADRYAN and R PCA infarcts.    Telemonitor  HR Uncontrolled. Increase Metoprolol 25 mg TID with strict parameters. Use NG tube   May use Metoprolol 5 mg IVP for sustained HR >125   [id number] VASc:  6 points. Stroke risk was 9.7% and 13.6% risk of stroke/TIA/systemic embolism.  Start full AC with Heparin. Monitor coag panel and daily CBC  Trend CE. Trend trops x 3 q6. Serial EKGs  Replace all electrolytes. Maintain K+~4 and mag~2    Will update Dr Moscoso in the AM. Further recommendations to follow

## 2022-08-11 NOTE — CHART NOTE - NSCHARTNOTEFT_GEN_A_CORE
Patient is now comfort measures only.  No further inpatient cardiac work up at this time.  Will sign off.  Please reconsult if needed.

## 2022-08-11 NOTE — PROGRESS NOTE ADULT - SUBJECTIVE AND OBJECTIVE BOX
Patient keeps eyes closed.   Moans.  Does not follow commands.     REVIEW OF SYSTEMS  Constitutional - No fever,  +fatigue  Neurological - +loss of strength    FUNCTIONAL PROGRESS  8/11 PMR  Total A    8/10 PT  Speech Language Pathology Recommendations: 1. NPO: short-term non-oral means of nutrition/hydration, as per pt/family wishes 2. Oral care3. Aspiration precautions 4. Consider further follow up discussion w/ palliative care to further discuss GOC plan regarding nutrition/ hydration5. Will follow to re-assess swallow & to complete speech eval, as schedule permits     8/8 PT  Bed Mobility  Bed Mobility Training Supine-to-Sit: maximum assist (25% patient effort);  2 person assist  Bed Mobility Training Limitations: decreased ability to use legs for bridging/pushing;  decreased ability to use arms for pushing/pulling;  impaired ability to control trunk for mobility;  decreased strength;  impaired balance;  impaired postural control;  cognitive, decreased safety awareness    Sit-Stand Transfer Training  Transfer Training Sit-to-Stand Transfer: maximum assist (25% patient effort);  2 person assist;  weight-bearing as tolerated  Transfer Training Stand-to-Sit Transfer: maximum assist (25% patient effort);  2 person assist;  weight-bearing as tolerated  Sit-to-Stand Transfer Training Transfer Safety Analysis: decreased balance;  decreased weight-shifting ability;  decreased cognition;  impaired coordination;  impaired balance;  decreased strength;  cognitive, decreased safety awareness;  impaired postural control;  impaired motor control;  right knee blocked, bilateral hand held assist     Gait Training  Gait Training: maximum assist (25% patient effort);  2 person assist;  weight-bearing as tolerated   right knee blocked, bilateral hand held assist ;  bed to chair;  stand pivot transfer   Gait Analysis: 2-point gait   decreased omar;  decreased step length;  shuffling;  Pt unable to advance RLE, Stand pivot transfer performed ;  impaired balance;  decreased strength;  impaired coordination;  impaired motor control;  impaired postural control;  cognitive, decreased safety awareness;  Bed to Chair;  right knee blocked, bilateral hand held assist     Stair Training  Physical Assist/Nonphysical Assist: unsafe at this time.     VITALS  T(C): 36.7 (08-11-22 @ 08:08), Max: 37.7 (08-10-22 @ 22:46)  HR: 156 (08-11-22 @ 08:08) (91 - 156)  BP: 123/86 (08-11-22 @ 08:08) (101/63 - 163/72)  RR: 18 (08-11-22 @ 08:08) (18 - 20)  SpO2: 95% (08-11-22 @ 08:08) (92% - 98%)  Wt(kg): --    MEDICATIONS   acetaminophen    Suspension .. 650 milliGRAM(s) every 6 hours PRN  ALBUTerol    90 MICROgram(s) HFA Inhaler 2 Puff(s) every 6 hours PRN  albuterol/ipratropium for Nebulization 3 milliLiter(s) every 6 hours PRN  AQUAPHOR (petrolatum Ointment) 1 Application(s) two times a day  aspirin  chewable 81 milliGRAM(s) daily  atorvastatin 20 milliGRAM(s) at bedtime  chlorhexidine 2% Cloths 1 Application(s) daily  doxazosin 2 milliGRAM(s) at bedtime  heparin   Injectable 4000 Unit(s) every 6 hours PRN  heparin   Injectable 2000 Unit(s) every 6 hours PRN  heparin  Infusion.  Unit(s)/Hr <Continuous>  melatonin 3 milliGRAM(s) at bedtime  metoprolol tartrate 25 milliGRAM(s) three times a day  morphine  - Injectable 2 milliGRAM(s) every 4 hours PRN  multivitamin 1 Tablet(s) daily  piperacillin/tazobactam IVPB.. 3.375 Gram(s) every 8 hours  polyethylene glycol 3350 17 Gram(s) daily  senna 2 Tablet(s) at bedtime  sodium chloride 2 Gram(s) three times a day      RECENT LABS/IMAGING                          9.2    6.35  )-----------( 166      ( 10 Aug 2022 06:12 )             27.1     08-10    140  |  106  |  11.6  ----------------------------<  147<H>  3.6   |  23.0  |  0.43<L>    Ca    8.3<L>      10 Aug 2022 22:10  Phos  3.2     08-10  Mg     1.8     08-10      PT/INR - ( 11 Aug 2022 04:20 )   PT: 13.8 sec;   INR: 1.19 ratio         PTT - ( 11 Aug 2022 04:20 )  PTT:27.2 sec      HEAD CT 8/5 -  Acute left ADRYAN, left MCA, & right PCA territory infarctions. No evidence of hemorrhagic transformation.      ----------------------------------------------------------------------------------------  PHYSICAL EXAM  Constitutional - NAD, Comfortable  HEENT - +NGT  Neck - Supple, No limited ROM  Chest - Breathing comfortably, No wheezing  Cardiovascular - S1S2   Abdomen - Soft   Extremities - No C/C/E, No calf tenderness   Neurologic Exam -                    Cognitive - AAO to self      Communication - Limited verbalizations     Cranial Nerves - Unable to assess     Motor - Unable to fully assess - keeps eye closed, noted to move right UE spontaneously/not purposefully      Sensory - Unable to assess  Psychiatric - Fatigued  ----------------------------------------------------------------------------------------  ASSESSMENT/PLAN  88y Female with functional deficits after an acute CVA   Acute left CVA s/p mechanical thrombectomy - HEPARIN IV, ASA, Lipitor   PNA - Zosyn, Duoneb, Proventil   HTN - Lopressor  Pain - Morphine, Tylenol  Oropharyngeal Dysphagia s/p NGT - NPO  DVT ppx- SCD's   Rehab - Patient medically optimized. Not awake enough to be able to pass repeat MBS. Recommend ongoing GOC conversations for Palliative/Hospice vs PEG.     At this time, recommend [facility]O, patient DOES NOT meet acute inpatient rehabilitation criteria. Patient needs a more prolonged stay to achieve transition to community living and would not be able to tolerate a comprehensive/intense rehab program of 3hours/day.     Will sign off at this time. Thank you for allowing me to be part of your patient's care. Please reconsult PMR for additional rehab recommendations or dispo needs if functional status changes.     Discussed with rehab clinical care team.

## 2022-08-11 NOTE — GOALS OF CARE CONVERSATION - ADVANCED CARE PLANNING - CONVERSATION DETAILS
Discussion held with daughter Nikole regarding worsening clinical condition and failing swallow testing x 3. Daughter noted that she saw her mother gasping yesterday and I told her I noted some dyspnea as well this AM. I also addressed atrial fibrillation and difficulty in rate controlling despite some addition of medications. Overall daughter states her mother would not want artifical feeding tube and already was struggling with dementia, and would not want to live life dependent on others or in a rehab. Daughter would like to shift focus to comfort measures only. I discussed inpatient hospice transfer and she is interested. Will discontinue all other measures like NG tube, telemetry,, antibiotics, only give things for symptoms and relief of suffering. No intubation or CPR.     Due to patient's health status and restrictions on visitations during this Public health emergency, advanced care planning discussion was performed via telephone with patient's daughter Nikole. 18 minutes spent.
discussion held with radha Agustin regarding overall condition, prognosis, and plan of care. Discussed with her strokes. Daughter let me know her underlying state before coming to the hospital ( dementia for the last year, still able to do some ADLs but cognition worsening, also breast cancer x 1.5 years ago - was on hormonal therapy). I addressed that she has failed her swallow test twice at this time but we will continue to monitor and see if she can be cleared for some kind of diet. I inquired on wishes regarding feeding tube. Per daughter Nikole her mother would not want artifical feeding tube to sustain her life, and living in deconditioned debilitated state would not be in line with her wishes. I also re-confirmed code status and she would not want CPR or mechanical ventilation. We also discussed hospice care and what that would mean. Will see how she does over the course of the next couple of days and have further discussions from there.     Due to patient's health status and restrictions on visitations during this Public health emergency, advanced care planning discussion was performed via telephone with patient's daughter Nikole. 18 minutes spent.
Care manager note: Chart reviewed.  Case discussed with interdisciplinary team.  Palliative MD noted that further goals of care held on this date and family receptive to inpatient hospice at this time.  Outreach made to patient's daughter Karrie Siddiqui ([phone]) who confirmed interest in inpatient hospice.  Hospice philosophy and inpatient hospice locations/agencies discussed at this time.  Family noted preference for [facility] due to location.  Referral sent at this time and pending review/determination.  Updated COVID requested in anticipation for discharge.  All questions answered.  Will continue to follow.

## 2022-08-11 NOTE — PROGRESS NOTE ADULT - ASSESSMENT
88F remote hx breast CA, HTN, HLD transferred from [facility] for Acute Stroke (L MCA M1 occlusion s/p TPA and M.T. TICI 3 recanalization) s/p extubation on 8/5 ccb Acute Hypoxic Resp failure sec to aspiration pna started on IV zosyn on 8/7 and dysphagia/poor mental status requiring NGT placement. after cva patient unable to improve on swallow status and seen multiple times via speech and swallow. as continued to fail family opted to pursue comfort care and remove ng tube.     #Acute CVA   - s/p tpa s/p mechanical thrombectomy   - neuo consult appreciated  - cardio consult appreciated  - aspirin, statin  - mr pending  - palliative care consult appreciated    #Acute Hypoxic Respiratory Failure  - 2/2 aspiration pneumonia  - zosyn  - aspiration precautions    #Dysphagia   - feeds via ng tube  - speech follow up  - palliative care consult appreciated    #HTN- Essential  - metoprolol, cardura  - monitor blood pressure    #Chronic Diastolic Heart Failure  - echo lvef 70% grade 2 diastolic dysfunction  - metoprolol  - cardio consult appreciated  - EP cardio consule appreciated- may need ILR on d/c    #HLD  - statin     #DVT Prophylaxis  - lovenox SC    plan of care d/w patient family bedside. all questions answered and emotional support provided 88F remote hx breast CA, HTN, HLD transferred from [facility] for Acute Stroke (L MCA M1 occlusion s/p TPA and M.T. TICI 3 recanalization) s/p extubation on 8/5 ccb Acute Hypoxic Resp failure sec to aspiration pna started on IV zosyn on 8/7 and dysphagia/poor mental status requiring NGT placement. after cva patient unable to improve on swallow status and seen multiple times via speech and swallow. as continued to fail family opted to pursue comfort care and remove ng tube.     #Acute CVA   - s/p tpa s/p mechanical thrombectomy   #Acute Hypoxic Respiratory Failure  - 2/2 aspiration pneumonia  #Dysphagia   #HTN- Essential  #Chronic Diastolic Heart Failure  #HLD  - patient transitioned to comfort care. pending hospice transfer     plan of care d/w patient family bedside. all questions answered and emotional support provided

## 2022-08-11 NOTE — DISCHARGE NOTE PROVIDER - NSDCMRMEDTOKEN_GEN_ALL_CORE_FT
diazePAM 5 mg/mL injectable solution: 10 milligram(s) injectable every 8 hours  diazePAM 5 mg/mL injectable solution: 10 milligram(s) injectable every 4 hours, As Needed agitation/restlessness  ipratropium-albuterol 0.5 mg-2.5 mg/3 mL inhalation solution: 3 milliliter(s) inhaled every 6 hours, As needed, Shortness of Breath and/or Wheezing  morphine: 2 milligram(s) intravenous every 6 hours  morphine: 2 milligram(s) intravenous every 4 hours, As Needed pain or dyspnea  petrolatum topical ointment: 1 application topically 2 times a day

## 2023-03-22 NOTE — PHYSICAL THERAPY INITIAL EVALUATION ADULT - LEVEL OF CONSCIOUSNESS, REHAB EVAL
Resident stable and problem well controlled. Will continue same orders and treatment   lethargic/somnolent/confused

## 2023-03-24 NOTE — PHYSICAL THERAPY INITIAL EVALUATION ADULT - LEVEL OF INDEPENDENCE: STAND/SIT, REHAB EVAL
Patient was here and received her [facility] approved injection today. contact guard

## 2023-04-18 NOTE — PROGRESS NOTE ADULT - PROVIDER SPECIALTY LIST ADULT
Hospitalist
Intervent Radiology
NSICU
Neurology
Palliative Care
Hospitalist
NSICU
Neurology
Palliative Care
Palliative Care
Rehab Medicine
Intervent Radiology
NSICU
Neurology
Hospitalist
Hospitalist
NSICU
Cardiology
Benzoyl Peroxide Pregnancy And Lactation Text: This medication is Pregnancy Category C. It is unknown if benzoyl peroxide is excreted in breast milk.
